# Patient Record
Sex: FEMALE | Race: WHITE | Employment: FULL TIME | URBAN - NONMETROPOLITAN AREA
[De-identification: names, ages, dates, MRNs, and addresses within clinical notes are randomized per-mention and may not be internally consistent; named-entity substitution may affect disease eponyms.]

---

## 2017-01-17 ENCOUNTER — OFFICE VISIT (OUTPATIENT)
Dept: SURGERY | Facility: OTHER | Age: 42
End: 2017-01-17
Attending: SURGERY
Payer: COMMERCIAL

## 2017-01-17 VITALS
DIASTOLIC BLOOD PRESSURE: 60 MMHG | TEMPERATURE: 98.6 F | HEART RATE: 58 BPM | BODY MASS INDEX: 27.28 KG/M2 | SYSTOLIC BLOOD PRESSURE: 110 MMHG | OXYGEN SATURATION: 98 % | RESPIRATION RATE: 20 BRPM | HEIGHT: 68 IN | WEIGHT: 180 LBS

## 2017-01-17 DIAGNOSIS — N64.89 RADIAL SCAR OF BREAST: ICD-10-CM

## 2017-01-17 DIAGNOSIS — N63.0 BREAST MASS: Primary | ICD-10-CM

## 2017-01-17 PROCEDURE — 99212 OFFICE O/P EST SF 10 MIN: CPT

## 2017-01-17 PROCEDURE — 99214 OFFICE O/P EST MOD 30 MIN: CPT | Performed by: SURGERY

## 2017-01-17 RX ORDER — CEFAZOLIN SODIUM 2 G/100ML
2 INJECTION, SOLUTION INTRAVENOUS
Status: CANCELLED | OUTPATIENT
Start: 2017-01-17

## 2017-01-17 ASSESSMENT — PAIN SCALES - GENERAL: PAINLEVEL: NO PAIN (0)

## 2017-01-17 NOTE — MR AVS SNAPSHOT
After Visit Summary   1/17/2017    Tere Camarena    MRN: [id number]           Patient Information     Date Of Birth          1975        Visit Information        Provider Department      1/17/2017 10:30 AM Sita Rushing MD [facility]        Today's Diagnoses     Breast mass    -  1       Care Instructions    Thank you for allowing Dr. Rushing and our surgical team to participate in your care.  Please call with any questions or concerns to our direct line at [phone].    You are scheduled for:  Wire localized excisional biopsy, left breast mass        The date of your procedure is: Monday, January 30, 2017    Preoperative history and physical with your primary care has been scheduled - January 26, 2017 at 0915    Nothing to eat or drink after midnight, Sunday, January 29, 2017        HOW TO PREPARE-       You need a friend or family member available to drive you home AND stay with you for 24 hours after you leave the hospital. You will not be allowed to drive yourself. IF you need to take a taxi or the bus you MUST have a responsible person to ride with you. YOUR PROCEDURE WILL BE CANCELLED IF YOU DO NOT HAVE A RESPONSIBLE ADULT TO DRIVE YOU HOME.       You need to call our Surgery Education Nurses 1-2 weeks prior to your surgery date. Please have you medication and allergy lists ready.  [phone] or toll free [phone]      Stop your aspirin or other NSAIDs(Ibuprofen, Motrin, Aleve, Celebrex, Naproxen, etc...) 7 days before your surgery.      Hospital admitting will call you the day before your surgery with your arrival time. If you are scheduled on a Monday admitting will call you the Friday before.      Please call your primary care physician if you should become ill within 24 hours of scheduled surgery. (ex.vomiting, diarrhea, fever)                 Follow-ups after your visit        Your next 10 appointments already scheduled     Jan 26, 2017  9:30 AM   (Arrive by  "9:15 AM)   Pre-Op physical with SURESH Gil   [facility])    [address]  [facility] [address]   [phone]              Who to contact     If you have questions or need follow up information about today's clinic visit or your schedule please contact [facility] directly at [phone].  Normal or non-critical lab and imaging results will be communicated to you by UNI5hart, letter or phone within 4 business days after the clinic has received the results. If you do not hear from us within 7 days, please contact the clinic through Ubequityt or phone. If you have a critical or abnormal lab result, we will notify you by phone as soon as possible.  Submit refill requests through [employer] or call your pharmacy and they will forward the refill request to us. Please allow 3 business days for your refill to be completed.          Additional Information About Your Visit        UNI5harChaCha Information     [employer] gives you secure access to your electronic health record. If you see a primary care provider, you can also send messages to your care team and make appointments. If you have questions, please call your primary care clinic.  If you do not have a primary care provider, please call [phone] and they will assist you.        Care EveryWhere ID     This is your Care EveryWhere ID. This could be used by other organizations to access your [place] medical records  [id number]        Your Vitals Were     Pulse Temperature Respirations Height BMI (Body Mass Index) Pulse Oximetry    58 98.6  F (37  C) (Tympanic) 20 5' 8\" (1.727 m) 27.38 kg/m2 98%       Blood Pressure from Last 3 Encounters:   01/17/17 110/60   10/18/16 112/64   10/02/16 120/80    Weight from Last 3 Encounters:   01/17/17 180 lb (81.647 kg)   10/18/16 180 lb (81.647 kg)   09/07/16 180 lb (81.647 kg)              Today, you had the following     No orders found for display       Primary Care Provider " Office Phone # Fax #    Yaneth SURESH Cash [phone]       [facility] [place] [address]  [place] [address]        Thank you!     Thank you for choosing [facility] [place]  for your care. Our goal is always to provide you with excellent care. Hearing back from our patients is one way we can continue to improve our services. Please take a few minutes to complete the written survey that you may receive in the mail after your visit with us. Thank you!             Your Updated Medication List - Protect others around you: Learn how to safely use, store and throw away your medicines at www.disposemymeds.org.          This list is accurate as of: 1/17/17 10:50 AM.  Always use your most recent med list.                   Brand Name Dispense Instructions for use    HYDROcodone-acetaminophen 5-325 MG per tablet    NORCO     Take 1 tablet by mouth every 4 hours as needed for moderate to severe pain 1-2 tablets as need for pain every 4 hours.       hydrocortisone 2.5 % cream    ANUSOL-HC    30 g    Place rectally 2 times daily 1 tube       ibuprofen 800 MG tablet    ADVIL/MOTRIN    60 tablet    Take 1 tablet (800 mg) by mouth every 8 hours as needed for moderate pain       olopatadine 0.1 % ophthalmic solution    PATANOL    5 mL    Place 1 drop into both eyes 2 times daily

## 2017-01-17 NOTE — PATIENT INSTRUCTIONS
Thank you for allowing Dr. Rushing and our surgical team to participate in your care.  Please call with any questions or concerns to our direct line at [phone].    You are scheduled for:  Wire localized excisional biopsy, left breast mass        The date of your procedure is: Monday, January 30, 2017    Preoperative history and physical with your primary care has been scheduled - January 26, 2017 at 0915    Nothing to eat or drink after midnight, Sunday, January 29, 2017        HOW TO PREPARE-       You need a friend or family member available to drive you home AND stay with you for 24 hours after you leave the hospital. You will not be allowed to drive yourself. IF you need to take a taxi or the bus you MUST have a responsible person to ride with you. YOUR PROCEDURE WILL BE CANCELLED IF YOU DO NOT HAVE A RESPONSIBLE ADULT TO DRIVE YOU HOME.       You need to call our Surgery Education Nurses 1-2 weeks prior to your surgery date. Please have you medication and allergy lists ready.  [phone] or toll free [phone]      Stop your aspirin or other NSAIDs(Ibuprofen, Motrin, Aleve, Celebrex, Naproxen, etc...) 7 days before your surgery.      Hospital admitting will call you the day before your surgery with your arrival time. If you are scheduled on a Monday admitting will call you the Friday before.      Please call your primary care physician if you should become ill within 24 hours of scheduled surgery. (ex.vomiting, diarrhea, fever)

## 2017-01-17 NOTE — NURSING NOTE
"Chief Complaint   Patient presents with     Breast Problem     Dsicuss left breast mass. Bx done 09/21/2016.       Initial /60 mmHg  Pulse 58  Temp(Src) 98.6  F (37  C) (Tympanic)  Resp 20  Ht 5' 8\" (1.727 m)  Wt 180 lb (81.647 kg)  BMI 27.38 kg/m2  SpO2 98% Estimated body mass index is 27.38 kg/(m^2) as calculated from the following:    Height as of this encounter: 5' 8\" (1.727 m).    Weight as of this encounter: 180 lb (81.647 kg).  BP completed using cuff size: jon Gomez      "

## 2017-01-24 ENCOUNTER — TELEPHONE (OUTPATIENT)
Dept: FAMILY MEDICINE | Facility: OTHER | Age: 42
End: 2017-01-24

## 2017-01-24 DIAGNOSIS — T78.40XA ALLERGIC STATE, INITIAL ENCOUNTER: Primary | ICD-10-CM

## 2017-01-24 NOTE — TELEPHONE ENCOUNTER
Received a fax from [facility] that Patanol Op 0.1% Sol drops are not covered.  Switch to Ketotifen or Cromolyn?  I pended the Ketotifen 0.025% drops for you that you had circled on the paper.  Thanks.

## 2017-01-25 DIAGNOSIS — N63.20 LEFT BREAST MASS: Primary | ICD-10-CM

## 2017-01-27 ENCOUNTER — TELEPHONE (OUTPATIENT)
Dept: FAMILY MEDICINE | Facility: OTHER | Age: 42
End: 2017-01-27

## 2017-01-27 ENCOUNTER — OFFICE VISIT (OUTPATIENT)
Dept: FAMILY MEDICINE | Facility: OTHER | Age: 42
End: 2017-01-27
Attending: PHYSICIAN ASSISTANT
Payer: COMMERCIAL

## 2017-01-27 VITALS
HEART RATE: 66 BPM | HEIGHT: 68 IN | DIASTOLIC BLOOD PRESSURE: 66 MMHG | SYSTOLIC BLOOD PRESSURE: 107 MMHG | OXYGEN SATURATION: 98 % | WEIGHT: 198.2 LBS | TEMPERATURE: 97 F | RESPIRATION RATE: 16 BRPM | BODY MASS INDEX: 30.04 KG/M2

## 2017-01-27 DIAGNOSIS — Z01.818 PREOP GENERAL PHYSICAL EXAM: Primary | ICD-10-CM

## 2017-01-27 LAB
ANION GAP SERPL CALCULATED.3IONS-SCNC: 10 MMOL/L (ref 3–14)
BUN SERPL-MCNC: 8 MG/DL (ref 7–30)
CALCIUM SERPL-MCNC: 8.3 MG/DL (ref 8.5–10.1)
CHLORIDE SERPL-SCNC: 108 MMOL/L (ref 94–109)
CO2 SERPL-SCNC: 23 MMOL/L (ref 20–32)
CREAT SERPL-MCNC: 0.58 MG/DL (ref 0.52–1.04)
ERYTHROCYTE [DISTWIDTH] IN BLOOD BY AUTOMATED COUNT: 12.6 % (ref 10–15)
GFR SERPL CREATININE-BSD FRML MDRD: ABNORMAL ML/MIN/1.7M2
GLUCOSE SERPL-MCNC: 83 MG/DL (ref 70–99)
HCG UR QL: NEGATIVE
HCT VFR BLD AUTO: 37.8 % (ref 35–47)
HGB BLD-MCNC: 12.9 G/DL (ref 11.7–15.7)
MCH RBC QN AUTO: 33 PG (ref 26.5–33)
MCHC RBC AUTO-ENTMCNC: 34.1 G/DL (ref 31.5–36.5)
MCV RBC AUTO: 97 FL (ref 78–100)
PLATELET # BLD AUTO: 195 10E9/L (ref 150–450)
POTASSIUM SERPL-SCNC: 3.6 MMOL/L (ref 3.4–5.3)
RBC # BLD AUTO: 3.91 10E12/L (ref 3.8–5.2)
SODIUM SERPL-SCNC: 141 MMOL/L (ref 133–144)
TSH SERPL DL<=0.05 MIU/L-ACNC: 1.75 MU/L (ref 0.4–4)
WBC # BLD AUTO: 6.1 10E9/L (ref 4–11)

## 2017-01-27 PROCEDURE — 93010 ELECTROCARDIOGRAM REPORT: CPT | Performed by: INTERNAL MEDICINE

## 2017-01-27 PROCEDURE — 84443 ASSAY THYROID STIM HORMONE: CPT | Performed by: PHYSICIAN ASSISTANT

## 2017-01-27 PROCEDURE — 81025 URINE PREGNANCY TEST: CPT | Performed by: PHYSICIAN ASSISTANT

## 2017-01-27 PROCEDURE — 99212 OFFICE O/P EST SF 10 MIN: CPT | Mod: 25

## 2017-01-27 PROCEDURE — 93005 ELECTROCARDIOGRAM TRACING: CPT

## 2017-01-27 PROCEDURE — 80048 BASIC METABOLIC PNL TOTAL CA: CPT | Performed by: PHYSICIAN ASSISTANT

## 2017-01-27 PROCEDURE — 99214 OFFICE O/P EST MOD 30 MIN: CPT | Mod: 25 | Performed by: PHYSICIAN ASSISTANT

## 2017-01-27 PROCEDURE — 36415 COLL VENOUS BLD VENIPUNCTURE: CPT | Performed by: PHYSICIAN ASSISTANT

## 2017-01-27 PROCEDURE — 85027 COMPLETE CBC AUTOMATED: CPT | Performed by: PHYSICIAN ASSISTANT

## 2017-01-27 ASSESSMENT — PAIN SCALES - GENERAL: PAINLEVEL: NO PAIN (0)

## 2017-01-27 NOTE — PROGRESS NOTES
[address]  [facility] [address]  [phone]  Dept: [phone]    PRE-OP EVALUATION:  Today's date: 2017    Tere Camarena (: 1975) presents for pre-operative evaluation assessment as requested by Dr. Rushing.  She requires evaluation and anesthesia risk assessment prior to undergoing surgery/procedure for treatment of left breast mass .  Proposed procedure: left breast wire localized excisoinal bx    Date of Surgery/ Procedure: 17    Hospital/Surgical Facility: [facility]    Primary Physician: Yaneth Ochoa  Type of Anesthesia Anticipated: to be determined    Patient has a Health Care Directive or Living Will:  NO    1. NO - Do you have a history of heart attack, stroke, stent, bypass or surgery on an artery in the head, neck, heart or legs?  2. NO - Do you ever have any pain or discomfort in your chest?  3. NO - Do you have a history of  Heart Failure?  4. NO - Are you troubled by shortness of breath when: walking on the level, up a slight hill or at night?  5. NO - Do you currently have a cold, bronchitis or other respiratory infection?  6. NO - Do you have a cough, shortness of breath or wheezing?  7. NO - Do you sometimes get pains in the calves of your legs when you walk?  8. NO - Do you or anyone in your family have previous history of blood clots?  9. NO - Do you or does anyone in your family have a serious bleeding problem such as prolonged bleeding following surgeries or cuts?  10. YES - HAVE YOU EVER HAD PROBLEMS WITH ANEMIA OR BEEN TOLD TO TAKE IRON PILLS? When pregnant  11. YES - HAVE YOU HAD ANY ABNORMAL BLOOD LOSS SUCH AS BLACK, TARRY OR BLOODY STOOLS, OR ABNORMAL VAGINAL BLEEDING? Pt has current irregular periods and blood in stools  12. NO - Have you ever had a blood transfusion?  13. NO - Have you or any of your relatives ever had problems with anesthesia?  14. NO - Do you have sleep apnea, excessive snoring or daytime drowsiness?  15. NO - Do you  have any prosthetic heart valves?  16. NO - Do you have prosthetic joints?  17. NO - Is there any chance that you may be pregnant?      HPI:                                                      Brief HPI related to upcoming procedure: Breast mass found on mammogram.      See problem list for active medical problems.  Problems all longstanding and stable, except as noted/documented.  See ROS for pertinent symptoms related to these conditions.                                                                                                  .    MEDICAL HISTORY:                                                      Patient Active Problem List    Diagnosis Date Noted     ACP (advance care planning) 06/30/2016     Priority: Medium     Advance Care Planning 6/30/2016: ACP Review of Chart / Resources Provided:  Reviewed chart for advance care plan.  Tere Camarena has no plan or code status on file. Discussed available resources and provided with information. Confirmed code status reflects current choices pending further ACP discussions.  Confirmed/documented legally designated decision makers.  Added by Mitra Adamson              History reviewed. No pertinent past medical history.  Past Surgical History   Procedure Laterality Date     Abdomen surgery       hernia surgery X5     Gyn surgery  2004     tubal ligation     Breast surgery Left 09/28/2016     biopsy     Breast surgery Left 09/13/2016     biopsy     Orthopedic surgery Left [id number]     left knee medial retinacular repair, VMO advancement, and reapir of partial tear of quadriceps tendon     Current Outpatient Prescriptions   Medication Sig Dispense Refill     ketotifen (ZADITOR/REFRESH ANTI-ITCH) 0.025 % SOLN ophthalmic solution Place 1 drop into both eyes 2 times daily 1 Bottle 0     HYDROcodone-acetaminophen (NORCO) 5-325 MG per tablet Take 1 tablet by mouth every 4 hours as needed for moderate to severe pain 1-2 tablets as need for pain every 4 hours.        olopatadine (PATANOL) 0.1 % ophthalmic solution Place 1 drop into both eyes 2 times daily 5 mL 3     hydrocortisone (ANUSOL-HC) 2.5 % rectal cream Place rectally 2 times daily 1 tube 30 g 2     ibuprofen (ADVIL,MOTRIN) 800 MG tablet Take 1 tablet (800 mg) by mouth every 8 hours as needed for moderate pain 60 tablet 1     OTC products: None, except as noted above    Allergies   Allergen Reactions     Peanuts [Peanut Oil]      Eyes swell     Trees Other (See Comments)     Tree sap makes her eyes swell shut      Latex Allergy: NO    Social History   Substance Use Topics     Smoking status: Current Every Day Smoker -- 0.50 packs/day for 11 years     Smokeless tobacco: Never Used      Comment: trying on her own     Alcohol Use: Yes      Comment: twice a week     History   Drug Use No       REVIEW OF SYSTEMS:                                                    C: NEGATIVE for fever, chills, change in weight  I: NEGATIVE for worrisome rashes, moles or lesions  E: NEGATIVE for vision changes or irritation  E/M: NEGATIVE for ear, mouth and throat problems  R: NEGATIVE for significant cough or SOB  CV: NEGATIVE for chest pain, palpitations or peripheral edema  GI: NEGATIVE for nausea, abdominal pain, heartburn, or change in bowel habits  : NEGATIVE for frequency, dysuria, or hematuria  M: NEGATIVE for significant arthralgias or myalgia  N: NEGATIVE for weakness, dizziness or paresthesias  E: NEGATIVE for temperature intolerance, skin/hair changes  H: NEGATIVE for bleeding problems  P: NEGATIVE for changes in mood or affect    EXAM:                                                    [facility] 01/10/2017    GENERAL APPEARANCE: healthy, alert and no distress     EYES: EOMI,- PERRL     HENT: ear canals and TM's normal and nose and mouth without ulcers or lesions     NECK: no adenopathy, no asymmetry, masses, or scars and thyroid normal to palpation     RESP: lungs clear to auscultation - no rales, rhonchi or wheezes     CV: regular  rates and rhythm, normal S1 S2, no S3 or S4 and no murmur, click or rub -     ABDOMEN:  soft, nontender, no HSM or masses and bowel sounds normal     SKIN: no suspicious lesions or rashes     NEURO: Normal strength and tone, sensory exam grossly normal, mentation intact and speech normal     PSYCH: mentation appears normal. and affect normal/bright     LYMPHATICS: No axillary, cervical, inguinal, or supraclavicular nodes    DIAGNOSTICS:                                                      Results for orders placed or performed in visit on 01/27/17   CBC with platelets   Result Value Ref Range    WBC 6.1 4.0 - 11.0 10e9/L    RBC Count 3.91 3.8 - 5.2 10e12/L    Hemoglobin 12.9 11.7 - 15.7 g/dL    Hematocrit 37.8 35.0 - 47.0 %    MCV 97 78 - 100 fl    MCH 33.0 26.5 - 33.0 pg    MCHC 34.1 31.5 - 36.5 g/dL    RDW 12.6 10.0 - 15.0 %    Platelet Count 195 150 - 450 10e9/L   Basic metabolic panel   Result Value Ref Range    Sodium 141 133 - 144 mmol/L    Potassium 3.6 3.4 - 5.3 mmol/L    Chloride 108 94 - 109 mmol/L    Carbon Dioxide 23 20 - 32 mmol/L    Anion Gap 10 3 - 14 mmol/L    Glucose 83 70 - 99 mg/dL    Urea Nitrogen 8 7 - 30 mg/dL    Creatinine 0.58 0.52 - 1.04 mg/dL    GFR Estimate >90  Non  GFR Calc   >60 mL/min/1.7m2    GFR Estimate If Black >90   GFR Calc   >60 mL/min/1.7m2    Calcium 8.3 (L) 8.5 - 10.1 mg/dL   TSH   Result Value Ref Range    TSH 1.75 0.40 - 4.00 mU/L   HCG qualitative urine   Result Value Ref Range    HCG Qual Urine Negative NEG         Recent Labs   Lab Test  04/08/16   0954  11/11/15   1218   HGB  15.6  14.4   PLT  226  240   INR   --   0.90   NA  138  138   POTASSIUM  3.5  3.7   CR  0.47*  0.57   A1C  4.9   --         IMPRESSION:                                                      (Z01.818) Preop general physical exam  (primary encounter diagnosis)          RECOMMENDATIONS:                                                    Patient had a normal exam today.  She is cleared for surgery 1030-17, [facility], Dr. Rushing.       Signed Electronically by: SURESH Garcia    Copy of this evaluation report is provided to requesting physician.    [place] Preop Guidelines

## 2017-01-27 NOTE — TELEPHONE ENCOUNTER
Received a fax from [facility] that a Prior Auth is needed for patient's Patanol Op 0.1% Sol.  [employer] form was filled out and faxed to [phone] on 1/26/17.  The pharmacy and patient will be notified once we hear back on an approval or denial from insurance.  Forms will be sent to scanning once we have everything complete.  Rosalie Romero LPN

## 2017-01-27 NOTE — NURSING NOTE
"Chief Complaint   Patient presents with     Pre-Op Exam       Initial /66 mmHg  Pulse 66  Temp(Src) 97  F (36.1  C) (Tympanic)  Resp 16  Ht 5' 8\" (1.727 m)  Wt 198 lb 3.2 oz (89.903 kg)  BMI 30.14 kg/m2  SpO2 98%  LMP 01/10/2017 Estimated body mass index is 30.14 kg/(m^2) as calculated from the following:    Height as of this encounter: 5' 8\" (1.727 m).    Weight as of this encounter: 198 lb 3.2 oz (89.903 kg).  BP completed using cuff size: jon Adamson      "

## 2017-01-27 NOTE — MR AVS SNAPSHOT
After Visit Summary   1/27/2017    Tere Camarena    MRN: [id number]           Patient Information     Date Of Birth          1975        Visit Information        Provider Department      1/27/2017 9:30 AM Yaneth Ochoa PA [facility]        Today's Diagnoses     Preop general physical exam    -  1       Care Instructions      Before Your Surgery      Call your surgeon if there is any change in your health. This includes signs of a cold or flu (such as a sore throat, runny nose, cough, rash or fever).    Do not smoke, drink alcohol or take over the counter medicine (unless your surgeon or primary care doctor tells you to) for the 24 hours before and after surgery.    If you take prescribed drugs: Follow your doctor s orders about which medicines to take and which to stop until after surgery.    Eating and drinking prior to surgery: follow the instructions from your surgeon    Take a shower or bath the night before surgery. Use the soap your surgeon gave you to gently clean your skin. If you do not have soap from your surgeon, use your regular soap. Do not shave or scrub the surgery site.  Wear clean pajamas and have clean sheets on your bed.         Follow-ups after your visit        Your next 10 appointments already scheduled     Jan 30, 2017  8:30 AM   MAMMOGRAM with HI STEROEOTACTIC BREAST BIOPSY   [facility] Mammography ([facility] )    [address] [facility] [address] [phone]           Do not wear any body powder, lotions, deodorant or perfume the day of the exam. Bring a list of all medications, especially hormones.  If your last mammogram was not done at [place], please bring your mammogram films. We will need the name of your MD/PA to send a copy of your report.  You may register in either the clinic or hospital, but the mammogram will done at the [facility] located in the Hospital.            Jan 30, 2017  8:30 AM   Radiology with Need  "Interventional Radiologist   [facility] Interventional Radiology ([facility] )    [address] [address]   [phone]            Jan 30, 2017   Procedure with Sita Rushing MD   [facility] Periop Services ([facility] )    [address] [id number] [phone]              Who to contact     If you have questions or need follow up information about today's clinic visit or your schedule please contact [facility] directly at [phone].  Normal or non-critical lab and imaging results will be communicated to you by Vibeshart, letter or phone within 4 business days after the clinic has received the results. If you do not hear from us within 7 days, please contact the clinic through Dreamitize or phone. If you have a critical or abnormal lab result, we will notify you by phone as soon as possible.  Submit refill requests through [employer] or call your pharmacy and they will forward the refill request to us. Please allow 3 business days for your refill to be completed.          Additional Information About Your Visit        VibesharBomoda Information     [employer] gives you secure access to your electronic health record. If you see a primary care provider, you can also send messages to your care team and make appointments. If you have questions, please call your primary care clinic.  If you do not have a primary care provider, please call [phone] and they will assist you.        Care EveryWhere ID     This is your Care EveryWhere ID. This could be used by other organizations to access your [place] medical records  [id number]        Your Vitals Were     Pulse Temperature Respirations Height BMI (Body Mass Index) Pulse Oximetry    66 97  F (36.1  C) (Tympanic) 16 5' 8\" (1.727 m) 30.14 kg/m2 98%    Last Period                   01/10/2017            Blood Pressure from Last 3 Encounters:   01/27/17 107/66   01/17/17 110/60   10/18/16 112/64    Weight from Last 3 " Encounters:   01/27/17 198 lb 3.2 oz (89.903 kg)   01/17/17 180 lb (81.647 kg)   10/18/16 180 lb (81.647 kg)              We Performed the Following     Basic metabolic panel     CBC with platelets     EKG 12-lead complete w/read - (Clinic Performed)     HCG qualitative urine     TSH          Today's Medication Changes          These changes are accurate as of: 1/27/17 10:59 AM.  If you have any questions, ask your nurse or doctor.               Stop taking these medicines if you haven't already. Please contact your care team if you have questions.     olopatadine 0.1 % ophthalmic solution   Commonly known as:  PATANOL   Stopped by:  Yaneth Ochoa PA                    Primary Care Provider Office Phone # Fax #    SURESH Gil [phone]       [facility] [place] [address]  [place] [address]        Thank you!     Thank you for choosing [facility]  for your care. Our goal is always to provide you with excellent care. Hearing back from our patients is one way we can continue to improve our services. Please take a few minutes to complete the written survey that you may receive in the mail after your visit with us. Thank you!             Your Updated Medication List - Protect others around you: Learn how to safely use, store and throw away your medicines at www.disposemymeds.org.          This list is accurate as of: 1/27/17 10:59 AM.  Always use your most recent med list.                   Brand Name Dispense Instructions for use    HYDROcodone-acetaminophen 5-325 MG per tablet    NORCO     Take 1 tablet by mouth every 4 hours as needed for moderate to severe pain 1-2 tablets as need for pain every 4 hours.       hydrocortisone 2.5 % cream    ANUSOL-HC    30 g    Place rectally 2 times daily 1 tube       ibuprofen 800 MG tablet    ADVIL/MOTRIN    60 tablet    Take 1 tablet (800 mg) by mouth every 8 hours as needed for moderate pain       ketotifen 0.025 % Soln  ophthalmic solution    ZADITOR/REFRESH ANTI-ITCH    1 Bottle    Place 1 drop into both eyes 2 times daily

## 2017-01-30 ENCOUNTER — HOSPITAL ENCOUNTER (OUTPATIENT)
Dept: MAMMOGRAPHY | Facility: HOSPITAL | Age: 42
End: 2017-01-30
Attending: SURGERY | Admitting: SURGERY
Payer: COMMERCIAL

## 2017-01-30 ENCOUNTER — APPOINTMENT (OUTPATIENT)
Dept: INTERVENTIONAL RADIOLOGY/VASCULAR | Facility: HOSPITAL | Age: 42
End: 2017-01-30
Attending: SURGERY
Payer: COMMERCIAL

## 2017-01-30 ENCOUNTER — SURGERY (OUTPATIENT)
Age: 42
End: 2017-01-30

## 2017-01-30 ENCOUNTER — ANESTHESIA EVENT (OUTPATIENT)
Dept: SURGERY | Facility: HOSPITAL | Age: 42
End: 2017-01-30
Payer: COMMERCIAL

## 2017-01-30 ENCOUNTER — ANESTHESIA (OUTPATIENT)
Dept: SURGERY | Facility: HOSPITAL | Age: 42
End: 2017-01-30
Payer: COMMERCIAL

## 2017-01-30 ENCOUNTER — HOSPITAL ENCOUNTER (OUTPATIENT)
Facility: HOSPITAL | Age: 42
Discharge: HOME | End: 2017-01-30
Attending: SURGERY | Admitting: SURGERY
Payer: COMMERCIAL

## 2017-01-30 VITALS
DIASTOLIC BLOOD PRESSURE: 60 MMHG | HEIGHT: 68 IN | SYSTOLIC BLOOD PRESSURE: 102 MMHG | HEART RATE: 71 BPM | BODY MASS INDEX: 28.79 KG/M2 | WEIGHT: 190 LBS | RESPIRATION RATE: 16 BRPM | OXYGEN SATURATION: 95 % | TEMPERATURE: 98.6 F

## 2017-01-30 DIAGNOSIS — Z98.890 STATUS POST LEFT BREAST BIOPSY: Primary | ICD-10-CM

## 2017-01-30 PROCEDURE — 71000014 ZZH RECOVERY PHASE 1 LEVEL 2 FIRST HR: Performed by: SURGERY

## 2017-01-30 PROCEDURE — 25000125 ZZHC RX 250: Performed by: ANESTHESIOLOGY

## 2017-01-30 PROCEDURE — 88307 TISSUE EXAM BY PATHOLOGIST: CPT | Mod: TC | Performed by: SURGERY

## 2017-01-30 PROCEDURE — 71000027 ZZH RECOVERY PHASE 2 EACH 15 MINS: Performed by: SURGERY

## 2017-01-30 PROCEDURE — 25000128 H RX IP 250 OP 636: Performed by: SURGERY

## 2017-01-30 PROCEDURE — 27210995 ZZH RX 272: Performed by: SURGERY

## 2017-01-30 PROCEDURE — 40000306 ZZH STATISTIC PRE PROC ASSESS II: Performed by: SURGERY

## 2017-01-30 PROCEDURE — 27210794 ZZH OR GENERAL SUPPLY STERILE: Performed by: SURGERY

## 2017-01-30 PROCEDURE — 25000125 ZZHC RX 250

## 2017-01-30 PROCEDURE — 36000052 ZZH SURGERY LEVEL 2 EA 15 ADDTL MIN: Performed by: SURGERY

## 2017-01-30 PROCEDURE — 25000566 ZZH SEVOFLURANE, EA 15 MIN: Performed by: ANESTHESIOLOGY

## 2017-01-30 PROCEDURE — 25000132 ZZH RX MED GY IP 250 OP 250 PS 637

## 2017-01-30 PROCEDURE — 25000125 ZZHC RX 250: Performed by: NURSE ANESTHETIST, CERTIFIED REGISTERED

## 2017-01-30 PROCEDURE — 25800025 ZZH RX 258: Performed by: ANESTHESIOLOGY

## 2017-01-30 PROCEDURE — 27110028 ZZH OR GENERAL SUPPLY NON-STERILE: Performed by: SURGERY

## 2017-01-30 PROCEDURE — 19281 PERQ DEVICE BREAST 1ST IMAG: CPT | Mod: TC,LT | Performed by: RADIOLOGY

## 2017-01-30 PROCEDURE — 25000125 ZZHC RX 250: Performed by: SURGERY

## 2017-01-30 PROCEDURE — 37000009 ZZH ANESTHESIA TECHNICAL FEE, EACH ADDTL 15 MIN: Performed by: SURGERY

## 2017-01-30 PROCEDURE — 19125 EXCISION BREAST LESION: CPT | Performed by: ANESTHESIOLOGY

## 2017-01-30 PROCEDURE — 36000050 ZZH SURGERY LEVEL 2 1ST 30 MIN: Performed by: SURGERY

## 2017-01-30 PROCEDURE — 19125 EXCISION BREAST LESION: CPT | Mod: LT | Performed by: SURGERY

## 2017-01-30 PROCEDURE — 01999 UNLISTED ANES PROCEDURE: CPT | Performed by: NURSE ANESTHETIST, CERTIFIED REGISTERED

## 2017-01-30 PROCEDURE — 37000008 ZZH ANESTHESIA TECHNICAL FEE, 1ST 30 MIN: Performed by: SURGERY

## 2017-01-30 RX ORDER — LIDOCAINE HYDROCHLORIDE 10 MG/ML
20 INJECTION, SOLUTION EPIDURAL; INFILTRATION; INTRACAUDAL; PERINEURAL ONCE
Status: DISCONTINUED | OUTPATIENT
Start: 2017-01-30 | End: 2017-01-30 | Stop reason: HOSPADM

## 2017-01-30 RX ORDER — LABETALOL HYDROCHLORIDE 5 MG/ML
10 INJECTION, SOLUTION INTRAVENOUS
Status: DISCONTINUED | OUTPATIENT
Start: 2017-01-30 | End: 2017-01-30 | Stop reason: HOSPADM

## 2017-01-30 RX ORDER — ALBUTEROL SULFATE 0.83 MG/ML
2.5 SOLUTION RESPIRATORY (INHALATION) EVERY 4 HOURS PRN
Status: DISCONTINUED | OUTPATIENT
Start: 2017-01-30 | End: 2017-01-30 | Stop reason: HOSPADM

## 2017-01-30 RX ORDER — HYDROMORPHONE HYDROCHLORIDE 1 MG/ML
.3-.5 INJECTION, SOLUTION INTRAMUSCULAR; INTRAVENOUS; SUBCUTANEOUS EVERY 10 MIN PRN
Status: DISCONTINUED | OUTPATIENT
Start: 2017-01-30 | End: 2017-01-30 | Stop reason: HOSPADM

## 2017-01-30 RX ORDER — LIDOCAINE HYDROCHLORIDE 10 MG/ML
INJECTION, SOLUTION EPIDURAL; INFILTRATION; INTRACAUDAL; PERINEURAL
Status: DISCONTINUED
Start: 2017-01-30 | End: 2017-01-30 | Stop reason: HOSPADM

## 2017-01-30 RX ORDER — HYDROCODONE BITARTRATE AND ACETAMINOPHEN 5; 325 MG/1; MG/1
TABLET ORAL
Status: COMPLETED
Start: 2017-01-30 | End: 2017-01-30

## 2017-01-30 RX ORDER — LIDOCAINE HYDROCHLORIDE 20 MG/ML
INJECTION, SOLUTION INFILTRATION; PERINEURAL PRN
Status: DISCONTINUED | OUTPATIENT
Start: 2017-01-30 | End: 2017-01-30

## 2017-01-30 RX ORDER — CEFAZOLIN SODIUM 2 G/100ML
2 INJECTION, SOLUTION INTRAVENOUS
Status: COMPLETED | OUTPATIENT
Start: 2017-01-30 | End: 2017-01-30

## 2017-01-30 RX ORDER — DEXAMETHASONE SODIUM PHOSPHATE 4 MG/ML
4 INJECTION, SOLUTION INTRA-ARTICULAR; INTRALESIONAL; INTRAMUSCULAR; INTRAVENOUS; SOFT TISSUE EVERY 10 MIN PRN
Status: DISCONTINUED | OUTPATIENT
Start: 2017-01-30 | End: 2017-01-30 | Stop reason: HOSPADM

## 2017-01-30 RX ORDER — FENTANYL CITRATE 50 UG/ML
INJECTION, SOLUTION INTRAMUSCULAR; INTRAVENOUS PRN
Status: DISCONTINUED | OUTPATIENT
Start: 2017-01-30 | End: 2017-01-30

## 2017-01-30 RX ORDER — KETOROLAC TROMETHAMINE 30 MG/ML
30 INJECTION, SOLUTION INTRAMUSCULAR; INTRAVENOUS EVERY 6 HOURS PRN
Status: DISCONTINUED | OUTPATIENT
Start: 2017-01-30 | End: 2017-01-30 | Stop reason: HOSPADM

## 2017-01-30 RX ORDER — SODIUM CHLORIDE, SODIUM LACTATE, POTASSIUM CHLORIDE, CALCIUM CHLORIDE 600; 310; 30; 20 MG/100ML; MG/100ML; MG/100ML; MG/100ML
1000 INJECTION, SOLUTION INTRAVENOUS CONTINUOUS
Status: DISCONTINUED | OUTPATIENT
Start: 2017-01-30 | End: 2017-01-30 | Stop reason: HOSPADM

## 2017-01-30 RX ORDER — BUPIVACAINE HYDROCHLORIDE AND EPINEPHRINE 2.5; 5 MG/ML; UG/ML
INJECTION, SOLUTION INFILTRATION; PERINEURAL PRN
Status: DISCONTINUED | OUTPATIENT
Start: 2017-01-30 | End: 2017-01-30 | Stop reason: HOSPADM

## 2017-01-30 RX ORDER — FENTANYL CITRATE 50 UG/ML
25-50 INJECTION, SOLUTION INTRAMUSCULAR; INTRAVENOUS
Status: DISCONTINUED | OUTPATIENT
Start: 2017-01-30 | End: 2017-01-30 | Stop reason: HOSPADM

## 2017-01-30 RX ORDER — ONDANSETRON 2 MG/ML
INJECTION INTRAMUSCULAR; INTRAVENOUS PRN
Status: DISCONTINUED | OUTPATIENT
Start: 2017-01-30 | End: 2017-01-30

## 2017-01-30 RX ORDER — FENTANYL CITRATE 50 UG/ML
INJECTION, SOLUTION INTRAMUSCULAR; INTRAVENOUS
Status: COMPLETED
Start: 2017-01-30 | End: 2017-01-30

## 2017-01-30 RX ORDER — ONDANSETRON 4 MG/1
4 TABLET, ORALLY DISINTEGRATING ORAL EVERY 30 MIN PRN
Status: DISCONTINUED | OUTPATIENT
Start: 2017-01-30 | End: 2017-01-30 | Stop reason: HOSPADM

## 2017-01-30 RX ORDER — NALOXONE HYDROCHLORIDE 0.4 MG/ML
.1-.4 INJECTION, SOLUTION INTRAMUSCULAR; INTRAVENOUS; SUBCUTANEOUS
Status: DISCONTINUED | OUTPATIENT
Start: 2017-01-30 | End: 2017-01-30 | Stop reason: HOSPADM

## 2017-01-30 RX ORDER — HYDRALAZINE HYDROCHLORIDE 20 MG/ML
2.5-5 INJECTION INTRAMUSCULAR; INTRAVENOUS EVERY 10 MIN PRN
Status: DISCONTINUED | OUTPATIENT
Start: 2017-01-30 | End: 2017-01-30 | Stop reason: HOSPADM

## 2017-01-30 RX ORDER — HYDROCODONE BITARTRATE AND ACETAMINOPHEN 5; 325 MG/1; MG/1
1 TABLET ORAL EVERY 4 HOURS PRN
Qty: 30 TABLET | Refills: 0 | Status: SHIPPED | OUTPATIENT
Start: 2017-01-30 | End: 2017-04-27

## 2017-01-30 RX ORDER — SODIUM CHLORIDE, SODIUM LACTATE, POTASSIUM CHLORIDE, CALCIUM CHLORIDE 600; 310; 30; 20 MG/100ML; MG/100ML; MG/100ML; MG/100ML
INJECTION, SOLUTION INTRAVENOUS CONTINUOUS
Status: DISCONTINUED | OUTPATIENT
Start: 2017-01-30 | End: 2017-01-30 | Stop reason: HOSPADM

## 2017-01-30 RX ORDER — MEPERIDINE HYDROCHLORIDE 25 MG/ML
12.5 INJECTION INTRAMUSCULAR; INTRAVENOUS; SUBCUTANEOUS
Status: DISCONTINUED | OUTPATIENT
Start: 2017-01-30 | End: 2017-01-30 | Stop reason: HOSPADM

## 2017-01-30 RX ORDER — SCOLOPAMINE TRANSDERMAL SYSTEM 1 MG/1
1 PATCH, EXTENDED RELEASE TRANSDERMAL ONCE
Status: COMPLETED | OUTPATIENT
Start: 2017-01-30 | End: 2017-01-30

## 2017-01-30 RX ORDER — DEXAMETHASONE SODIUM PHOSPHATE 10 MG/ML
INJECTION, SOLUTION INTRAMUSCULAR; INTRAVENOUS PRN
Status: DISCONTINUED | OUTPATIENT
Start: 2017-01-30 | End: 2017-01-30

## 2017-01-30 RX ORDER — FENTANYL CITRATE 50 UG/ML
25-50 INJECTION, SOLUTION INTRAMUSCULAR; INTRAVENOUS EVERY 5 MIN PRN
Status: DISCONTINUED | OUTPATIENT
Start: 2017-01-30 | End: 2017-01-30 | Stop reason: HOSPADM

## 2017-01-30 RX ORDER — PROPOFOL 10 MG/ML
INJECTION, EMULSION INTRAVENOUS PRN
Status: DISCONTINUED | OUTPATIENT
Start: 2017-01-30 | End: 2017-01-30

## 2017-01-30 RX ORDER — ONDANSETRON 2 MG/ML
4 INJECTION INTRAMUSCULAR; INTRAVENOUS EVERY 30 MIN PRN
Status: DISCONTINUED | OUTPATIENT
Start: 2017-01-30 | End: 2017-01-30 | Stop reason: HOSPADM

## 2017-01-30 RX ADMIN — FENTANYL CITRATE 25 MCG: 50 INJECTION, SOLUTION INTRAMUSCULAR; INTRAVENOUS at 11:46

## 2017-01-30 RX ADMIN — ONDANSETRON 4 MG: 2 INJECTION INTRAMUSCULAR; INTRAVENOUS at 11:24

## 2017-01-30 RX ADMIN — PROPOFOL 200 MG: 10 INJECTION, EMULSION INTRAVENOUS at 11:30

## 2017-01-30 RX ADMIN — HYDROCODONE BITARTRATE AND ACETAMINOPHEN 1 TABLET: 5; 325 TABLET ORAL at 13:39

## 2017-01-30 RX ADMIN — FENTANYL CITRATE 100 MCG: 50 INJECTION, SOLUTION INTRAMUSCULAR; INTRAVENOUS at 11:24

## 2017-01-30 RX ADMIN — DEXAMETHASONE SODIUM PHOSPHATE 10 MG: 10 INJECTION, SOLUTION INTRAMUSCULAR; INTRAVENOUS at 11:35

## 2017-01-30 RX ADMIN — HYDROMORPHONE HYDROCHLORIDE 0.5 MG: 1 INJECTION, SOLUTION INTRAMUSCULAR; INTRAVENOUS; SUBCUTANEOUS at 13:17

## 2017-01-30 RX ADMIN — CEFAZOLIN SODIUM 2 G: 2 INJECTION, SOLUTION INTRAVENOUS at 11:35

## 2017-01-30 RX ADMIN — CEFAZOLIN 1000 ML GIVEN: 1 INJECTION, POWDER, FOR SOLUTION INTRAMUSCULAR; INTRAVENOUS at 12:33

## 2017-01-30 RX ADMIN — FENTANYL CITRATE 50 MCG: 50 INJECTION, SOLUTION INTRAMUSCULAR; INTRAVENOUS at 12:33

## 2017-01-30 RX ADMIN — SCOPALAMINE 1 PATCH: 1 PATCH, EXTENDED RELEASE TRANSDERMAL at 07:47

## 2017-01-30 RX ADMIN — SODIUM CHLORIDE, POTASSIUM CHLORIDE, SODIUM LACTATE AND CALCIUM CHLORIDE: 600; 310; 30; 20 INJECTION, SOLUTION INTRAVENOUS at 12:56

## 2017-01-30 RX ADMIN — SODIUM CHLORIDE, POTASSIUM CHLORIDE, SODIUM LACTATE AND CALCIUM CHLORIDE 1000 ML: 600; 310; 30; 20 INJECTION, SOLUTION INTRAVENOUS at 07:55

## 2017-01-30 RX ADMIN — BUPIVACAINE HYDROCHLORIDE AND EPINEPHRINE BITARTRATE 50 ML: 2.5; .005 INJECTION, SOLUTION INFILTRATION; PERINEURAL at 12:32

## 2017-01-30 RX ADMIN — FENTANYL CITRATE 50 MCG: 50 INJECTION, SOLUTION INTRAMUSCULAR; INTRAVENOUS at 12:40

## 2017-01-30 RX ADMIN — MIDAZOLAM HYDROCHLORIDE 1 MG: 1 INJECTION, SOLUTION INTRAMUSCULAR; INTRAVENOUS at 09:09

## 2017-01-30 RX ADMIN — HYDROMORPHONE HYDROCHLORIDE 0.5 MG: 1 INJECTION, SOLUTION INTRAMUSCULAR; INTRAVENOUS; SUBCUTANEOUS at 12:55

## 2017-01-30 RX ADMIN — MIDAZOLAM HYDROCHLORIDE 1 MG: 1 INJECTION, SOLUTION INTRAMUSCULAR; INTRAVENOUS at 08:04

## 2017-01-30 RX ADMIN — LIDOCAINE HYDROCHLORIDE 40 MG: 20 INJECTION, SOLUTION INFILTRATION; PERINEURAL at 11:30

## 2017-01-30 RX ADMIN — FENTANYL CITRATE 25 MCG: 50 INJECTION, SOLUTION INTRAMUSCULAR; INTRAVENOUS at 11:43

## 2017-01-30 RX ADMIN — MIDAZOLAM HYDROCHLORIDE 2 MG: 1 INJECTION, SOLUTION INTRAMUSCULAR; INTRAVENOUS at 11:24

## 2017-01-30 RX ADMIN — FENTANYL CITRATE 50 MCG: 50 INJECTION, SOLUTION INTRAMUSCULAR; INTRAVENOUS at 11:37

## 2017-01-30 RX ADMIN — HYDROMORPHONE HYDROCHLORIDE 0.5 MG: 1 INJECTION, SOLUTION INTRAMUSCULAR; INTRAVENOUS; SUBCUTANEOUS at 13:07

## 2017-01-30 ASSESSMENT — LIFESTYLE VARIABLES: TOBACCO_USE: 1

## 2017-01-30 NOTE — IP AVS SNAPSHOT
MRN:[id number]                      After Visit Summary   1/30/2017    Tere Camarena    MRN: [id number]           Thank you!     Thank you for choosing [facility] for your care. Our goal is always to provide you with excellent care. Hearing back from our patients is one way we can continue to improve our services. Please take a few minutes to complete the written survey that you may receive in the mail after you visit with us. Thank you!        Patient Information     Date Of Birth          1975        About your hospital stay     You were admitted on:  January 30, 2017 You last received care in the:  [facility] Preop/Phase II    You were discharged on:  January 30, 2017       Who to Call     For medical emergencies, please call 911.  For non-urgent questions about your medical care, please call your primary care provider or clinic, [phone]  For questions related to your surgery, please call your surgery clinic        Attending Provider     Provider    Sita Rushing MD       Primary Care Provider Office Phone # Fax #    Yaneth SURESH Cash [phone]       [facility] [place] [address]  [place] [address]        Your next 10 appointments already scheduled     Feb 07, 2017 11:00 AM   (Arrive by 10:45 AM)   Post Op with Sita Rushing MD   [facility] [place] ([facility] [place] [facility])    [address]  [place] [address]   [phone]              Further instructions from your care team       Leave your dressing in place for 48 hours or until Thursday morning.  You may then remove the gauze dressing but leave the steri strips applied directly to the skin in place.    Following dressing removal, you may shower but do not directly soap or scrub the incision site.  An appointment for wound check and to go over the pathology will be made for one week (Tuesday, 2/6/17).    Bra support 24 hours a day for 2-3 weeks after breast biopsy aides in maximizing the cosmetic  result and we suggest that you use a supportive bra day and night for that time.  A prescription for pain medicine has been provided.  These are very constipating agents so metamucil or some fiber therapy is suggested to avoid constipation.        Please contact the office or return for questions, concerns or new complaints.  Our direct line is [phone] (nurse) or Rhianna () at [phone]      You may return to all activities without restriction.  Thank you for entrusting your care to [facility] and the Surgical Services Department.          Post-Anesthesia Patient Instructions    IMMEDIATELY FOLLOWING SURGERY:  Do not drive or operate machinery for the first twenty four hours after surgery.  Do not make any important decisions for twenty four hours after surgery or while taking narcotic pain medications or sedatives.  If you develop intractable nausea and vomiting or a severe headache please notify your doctor immediately.    FOLLOW-UP:  Please make an appointment with your surgeon as instructed. You do not need to follow up with anesthesia unless specifically instructed to do so.    WOUND CARE INSTRUCTIONS (if applicable):  Keep a dry clean dressing on the anesthesia/puncture wound site if there is drainage.  Once the wound has quit draining you may leave it open to air.  Generally you should leave the bandage intact for twenty four hours unless there is drainage.  If the epidural site drains for more than 36-48 hours please call the anesthesia department.    QUESTIONS?:  Please feel free to call your physician or the hospital  if you have any questions, and they will be happy to assist you.       Remove the scopolamine patch behind your left ear after 24 hours after application.   After removing the patch, wash your hands and the area behind your ear thoroughly with soap and water.   The patch will still contain some medicine after use.   To avoid accidental contact or ingestion by  "children or pets, fold the used patch in half with the sticky side together and throw away in the trash out of the reach of children and pets.       Pending Results     Date and Time Order Name Status Description    1/30/2017 1201 Surgical pathology exam In process             Admission Information        Provider Department Dept Phone    1/30/2017 Sita Rushing MD Hi Preop/Phase [phone]      Your Vitals Were     Blood Pressure Pulse Temperature Respirations    102/75 mmHg 71 96.9  F (36.1  C) (Oral) 21    Height Weight BMI (Body Mass Index) Pulse Oximetry    1.727 m (5' 8\") 86.183 kg (190 lb) 28.90 kg/m2 98%    Last Period             01/10/2017         [employer] Information     [employer] gives you secure access to your electronic health record. If you see a primary care provider, you can also send messages to your care team and make appointments. If you have questions, please call your primary care clinic.  If you do not have a primary care provider, please call [phone] and they will assist you.        Care EveryWhere ID     This is your Care EveryWhere ID. This could be used by other organizations to access your [facility] medical records  [id number]           Review of your medicines      CONTINUE these medicines which have NOT CHANGED        Dose / Directions    HYDROcodone-acetaminophen 5-325 MG per tablet   Commonly known as:  NORCO   Used for:  Status post left breast biopsy        Dose:  1 tablet   Take 1 tablet by mouth every 4 hours as needed for moderate to severe pain 1-2 tablets as need for pain every 4 hours.   Quantity:  30 tablet   Refills:  0       hydrocortisone 2.5 % cream   Commonly known as:  ANUSOL-HC   Used for:  External hemorrhoids        Place rectally 2 times daily 1 tube   Quantity:  30 g   Refills:  2       ibuprofen 800 MG tablet   Commonly known as:  ADVIL/MOTRIN   Used for:  Lumbar radiculopathy        Dose:  800 mg   Take 1 tablet (800 mg) by mouth every 8 hours as " needed for moderate pain   Quantity:  60 tablet   Refills:  1       ketotifen 0.025 % Soln ophthalmic solution   Commonly known as:  ZADITOR/REFRESH ANTI-ITCH   Used for:  Allergic state, initial encounter        Dose:  1 drop   Place 1 drop into both eyes 2 times daily   Quantity:  1 Bottle   Refills:  0            Where to get your medicines      Some of these will need a paper prescription and others can be bought over the counter. Ask your nurse if you have questions.     Bring a paper prescription for each of these medications    - HYDROcodone-acetaminophen 5-325 MG per tablet             Protect others around you: Learn how to safely use, store and throw away your medicines at www.disposemymeds.org.             Medication List: This is a list of all your medications and when to take them. Check marks below indicate your daily home schedule. Keep this list as a reference.      Medications           Morning Afternoon Evening Bedtime As Needed    HYDROcodone-acetaminophen 5-325 MG per tablet   Commonly known as:  NORCO   Take 1 tablet by mouth every 4 hours as needed for moderate to severe pain 1-2 tablets as need for pain every 4 hours.                                hydrocortisone 2.5 % cream   Commonly known as:  ANUSOL-HC   Place rectally 2 times daily 1 tube                                ibuprofen 800 MG tablet   Commonly known as:  ADVIL/MOTRIN   Take 1 tablet (800 mg) by mouth every 8 hours as needed for moderate pain                                ketotifen 0.025 % Soln ophthalmic solution   Commonly known as:  ZADITOR/REFRESH ANTI-ITCH   Place 1 drop into both eyes 2 times daily

## 2017-01-30 NOTE — IP AVS SNAPSHOT
HI Preop/Phase II    [address] [address]    Phone:  [phone]                                       After Visit Summary   1/30/2017    Tere Camarena    MRN: [id number]           After Visit Summary Signature Page     I have received my discharge instructions, and my questions have been answered. I have discussed any challenges I see with this plan with the nurse or doctor.    ..........................................................................................................................................  Patient/Patient Representative Signature      ..........................................................................................................................................  Patient Representative Print Name and Relationship to Patient    ..................................................               ................................................  Date                                            Time    ..........................................................................................................................................  Reviewed by Signature/Title    ...................................................              ..............................................  Date                                                            Time

## 2017-01-30 NOTE — ANESTHESIA CARE TRANSFER NOTE
Patient: Tere Camarena    BIOPSY BREAST NEEDLE LOCALIZATION (Left Breast)  Additional InformationProcedure(s):  WIRE LOCALIZED EXCISIONAL BIOPSY LEFT BREAST MASS - Wound Class: I-Clean    Diagnosis: BREAST MASS, LEFT  Diagnosis Additional Information: No value filed.    Anesthesia Type:   General, LMA     Note:  Airway :Nasal Cannula  Patient transferred to:PACU  Comments: Anesthesia Care Transfer Note    Patient: Tere Camarena    Transferred to: PACU    Patient vital signs: Stable    Airway: None    To PACU on supplemental O2.  Placed on monitor.  Report given to RN, all questions answered.  VSS.    Heath Shelley CRNA  1/30/2017      Vitals: (Last set prior to Anesthesia Care Transfer)              Electronically Signed By: WANDA Castaneda CRNA  January 30, 2017  12:30 PM

## 2017-01-30 NOTE — H&P (VIEW-ONLY)
[address]  [facility] [address]  [phone]  Dept: [phone]    PRE-OP EVALUATION:  Today's date: 2017    Tere Camarena (: 1975) presents for pre-operative evaluation assessment as requested by Dr. Rushing.  She requires evaluation and anesthesia risk assessment prior to undergoing surgery/procedure for treatment of left breast mass .  Proposed procedure: left breast wire localized excisoinal bx    Date of Surgery/ Procedure: 17    Hospital/Surgical Facility: [facility]    Primary Physician: Yaneth Ochoa  Type of Anesthesia Anticipated: to be determined    Patient has a Health Care Directive or Living Will:  NO    1. NO - Do you have a history of heart attack, stroke, stent, bypass or surgery on an artery in the head, neck, heart or legs?  2. NO - Do you ever have any pain or discomfort in your chest?  3. NO - Do you have a history of  Heart Failure?  4. NO - Are you troubled by shortness of breath when: walking on the level, up a slight hill or at night?  5. NO - Do you currently have a cold, bronchitis or other respiratory infection?  6. NO - Do you have a cough, shortness of breath or wheezing?  7. NO - Do you sometimes get pains in the calves of your legs when you walk?  8. NO - Do you or anyone in your family have previous history of blood clots?  9. NO - Do you or does anyone in your family have a serious bleeding problem such as prolonged bleeding following surgeries or cuts?  10. YES - HAVE YOU EVER HAD PROBLEMS WITH ANEMIA OR BEEN TOLD TO TAKE IRON PILLS? When pregnant  11. YES - HAVE YOU HAD ANY ABNORMAL BLOOD LOSS SUCH AS BLACK, TARRY OR BLOODY STOOLS, OR ABNORMAL VAGINAL BLEEDING? Pt has current irregular periods and blood in stools  12. NO - Have you ever had a blood transfusion?  13. NO - Have you or any of your relatives ever had problems with anesthesia?  14. NO - Do you have sleep apnea, excessive snoring or daytime drowsiness?  15. NO - Do you  have any prosthetic heart valves?  16. NO - Do you have prosthetic joints?  17. NO - Is there any chance that you may be pregnant?      HPI:                                                      Brief HPI related to upcoming procedure: Breast mass found on mammogram.      See problem list for active medical problems.  Problems all longstanding and stable, except as noted/documented.  See ROS for pertinent symptoms related to these conditions.                                                                                                  .    MEDICAL HISTORY:                                                      Patient Active Problem List    Diagnosis Date Noted     ACP (advance care planning) 06/30/2016     Priority: Medium     Advance Care Planning 6/30/2016: ACP Review of Chart / Resources Provided:  Reviewed chart for advance care plan.  Tere Camarena has no plan or code status on file. Discussed available resources and provided with information. Confirmed code status reflects current choices pending further ACP discussions.  Confirmed/documented legally designated decision makers.  Added by Mitra Adamson              History reviewed. No pertinent past medical history.  Past Surgical History   Procedure Laterality Date     Abdomen surgery       hernia surgery X5     Gyn surgery  2004     tubal ligation     Breast surgery Left 09/28/2016     biopsy     Breast surgery Left 09/13/2016     biopsy     Orthopedic surgery Left [id number]     left knee medial retinacular repair, VMO advancement, and reapir of partial tear of quadriceps tendon     Current Outpatient Prescriptions   Medication Sig Dispense Refill     ketotifen (ZADITOR/REFRESH ANTI-ITCH) 0.025 % SOLN ophthalmic solution Place 1 drop into both eyes 2 times daily 1 Bottle 0     HYDROcodone-acetaminophen (NORCO) 5-325 MG per tablet Take 1 tablet by mouth every 4 hours as needed for moderate to severe pain 1-2 tablets as need for pain every 4 hours.        olopatadine (PATANOL) 0.1 % ophthalmic solution Place 1 drop into both eyes 2 times daily 5 mL 3     hydrocortisone (ANUSOL-HC) 2.5 % rectal cream Place rectally 2 times daily 1 tube 30 g 2     ibuprofen (ADVIL,MOTRIN) 800 MG tablet Take 1 tablet (800 mg) by mouth every 8 hours as needed for moderate pain 60 tablet 1     OTC products: None, except as noted above    Allergies   Allergen Reactions     Peanuts [Peanut Oil]      Eyes swell     Trees Other (See Comments)     Tree sap makes her eyes swell shut      Latex Allergy: NO    Social History   Substance Use Topics     Smoking status: Current Every Day Smoker -- 0.50 packs/day for 11 years     Smokeless tobacco: Never Used      Comment: trying on her own     Alcohol Use: Yes      Comment: twice a week     History   Drug Use No       REVIEW OF SYSTEMS:                                                    C: NEGATIVE for fever, chills, change in weight  I: NEGATIVE for worrisome rashes, moles or lesions  E: NEGATIVE for vision changes or irritation  E/M: NEGATIVE for ear, mouth and throat problems  R: NEGATIVE for significant cough or SOB  CV: NEGATIVE for chest pain, palpitations or peripheral edema  GI: NEGATIVE for nausea, abdominal pain, heartburn, or change in bowel habits  : NEGATIVE for frequency, dysuria, or hematuria  M: NEGATIVE for significant arthralgias or myalgia  N: NEGATIVE for weakness, dizziness or paresthesias  E: NEGATIVE for temperature intolerance, skin/hair changes  H: NEGATIVE for bleeding problems  P: NEGATIVE for changes in mood or affect    EXAM:                                                    [facility] 01/10/2017    GENERAL APPEARANCE: healthy, alert and no distress     EYES: EOMI,- PERRL     HENT: ear canals and TM's normal and nose and mouth without ulcers or lesions     NECK: no adenopathy, no asymmetry, masses, or scars and thyroid normal to palpation     RESP: lungs clear to auscultation - no rales, rhonchi or wheezes     CV: regular  rates and rhythm, normal S1 S2, no S3 or S4 and no murmur, click or rub -     ABDOMEN:  soft, nontender, no HSM or masses and bowel sounds normal     SKIN: no suspicious lesions or rashes     NEURO: Normal strength and tone, sensory exam grossly normal, mentation intact and speech normal     PSYCH: mentation appears normal. and affect normal/bright     LYMPHATICS: No axillary, cervical, inguinal, or supraclavicular nodes    DIAGNOSTICS:                                                      Results for orders placed or performed in visit on 01/27/17   CBC with platelets   Result Value Ref Range    WBC 6.1 4.0 - 11.0 10e9/L    RBC Count 3.91 3.8 - 5.2 10e12/L    Hemoglobin 12.9 11.7 - 15.7 g/dL    Hematocrit 37.8 35.0 - 47.0 %    MCV 97 78 - 100 fl    MCH 33.0 26.5 - 33.0 pg    MCHC 34.1 31.5 - 36.5 g/dL    RDW 12.6 10.0 - 15.0 %    Platelet Count 195 150 - 450 10e9/L   Basic metabolic panel   Result Value Ref Range    Sodium 141 133 - 144 mmol/L    Potassium 3.6 3.4 - 5.3 mmol/L    Chloride 108 94 - 109 mmol/L    Carbon Dioxide 23 20 - 32 mmol/L    Anion Gap 10 3 - 14 mmol/L    Glucose 83 70 - 99 mg/dL    Urea Nitrogen 8 7 - 30 mg/dL    Creatinine 0.58 0.52 - 1.04 mg/dL    GFR Estimate >90  Non  GFR Calc   >60 mL/min/1.7m2    GFR Estimate If Black >90   GFR Calc   >60 mL/min/1.7m2    Calcium 8.3 (L) 8.5 - 10.1 mg/dL   TSH   Result Value Ref Range    TSH 1.75 0.40 - 4.00 mU/L   HCG qualitative urine   Result Value Ref Range    HCG Qual Urine Negative NEG         Recent Labs   Lab Test  04/08/16   0954  11/11/15   1218   HGB  15.6  14.4   PLT  226  240   INR   --   0.90   NA  138  138   POTASSIUM  3.5  3.7   CR  0.47*  0.57   A1C  4.9   --         IMPRESSION:                                                      (Z01.818) Preop general physical exam  (primary encounter diagnosis)          RECOMMENDATIONS:                                                    Patient had a normal exam today.  She is cleared for surgery 1030-17, [facility], Dr. Rushing.       Signed Electronically by: SURESH Garcia    Copy of this evaluation report is provided to requesting physician.    [place] Preop Guidelines

## 2017-01-30 NOTE — ANESTHESIA PREPROCEDURE EVALUATION
Anesthesia Evaluation     . Pt has had prior anesthetic.     No history of anesthetic complications     ROS/MED HX    ENT/Pulmonary:     (+)tobacco use, Current use 0.5 PPD packs/day  , . .    Neurologic:  - neg neurologic ROS     Cardiovascular:  - neg cardiovascular ROS       METS/Exercise Tolerance:     Hematologic:  - neg hematologic  ROS       Musculoskeletal:   (+) , , other musculoskeletal- Left Breast Mass      GI/Hepatic:  - neg GI/hepatic ROS       Renal/Genitourinary:  - ROS Renal section negative       Endo:     (+) Obesity, .      Psychiatric:  - neg psychiatric ROS       Infectious Disease:  - neg infectious disease ROS       Malignancy:      - no malignancy   Other: Comment: Significant EtOH Abuse hx (sober x 98 days)   (+) No chance of pregnancy              Physical Exam  Normal systems: cardiovascular and pulmonary    Airway   Mallampati: III  TM distance: >3 FB  Neck ROM: full    Dental   (+) chipped and missing    Cardiovascular   Rhythm and rate: regular and normal      Pulmonary    breath sounds clear to auscultation                    Anesthesia Plan      History & Physical Review  History and physical reviewed and following examination; no interval change.    ASA Status:  3 .    NPO Status:  > 8 hours    Plan for General and LMA with Intravenous and Propofol induction. Maintenance will be Balanced.    PONV prophylaxis:  Ondansetron (or other 5HT-3), Scopolamine patch and Dexamethasone or Solumedrol  1/27/2017 HCG: Negative      Postoperative Care  Postoperative pain management:  IV analgesics and Oral pain medications.      Consents  Anesthetic plan, risks, benefits and alternatives discussed with:  Patient..                          .

## 2017-01-30 NOTE — OP NOTE
PREOPERATIVE DIAGNOSIS:   Left breast radial scar, central breast.     POSTOPERATIVE DIAGNOSIS:   Left breast radial scar, central breast  PROCEDURE: Wire localized excision, left breast mass.    HISTORY: This 41 year old female underwent biopsy of the left breast on two occasions.  A radial scar was noted and wire localized excision indicated.   Localization wire was placed by Dr. Camara..  FINDINGS:   Specimen mammography confirmed the presence of the region in question with satisfactory gross margins demonstrated.  Both clips were included in the specimen.  As the lesion was located on the medial edge of the excision, an additional rim of tissue was excised to ensure completeness of excision.  PROCEDURE: With the patient in the supine position on the operating table with  localization wire in place in the left breast, general anesthesia was induced. The left breast, chest wall, axilla and upper arm were prepped and draped sterilely. The requisite timeout pause was then observed during which the patient's correct identity and planned procedure were confirmed and the preoperatively placed ink meghann denoting the left side was visualized. With concordance among the operating room staff, the procedure was commenced with a curvilinear incision along the superior border of the areola at the site of introduction of the localization wire. The incision was taken through full thickness skin to subcutaneous tissue and the nipple and areolar complex was elevated off the subcutaneous fat sharply with bleeding again controlled by electrocautery. Dissection was commenced circumferentially anterior to the investing breast fascia to allow complete excision. The wire and surrounding tissue was grasped with an Allis clamp and, using electrocautery, excised.  The location of the clips was visualized on the medial margin of the excision.  The specimen was removed and sutures placed to orient.  An additional rim of tissue was  excised at the site of the clip excision margin and submitted in juxtaposition to the main specimen on the radiology grid.   Specimen mammogram was obtained which confirmed excision of the region in question with both localization clips from prior biopsy included.  The wound was irrigated with antibiotic/saline solution and fastidious hemostasis obtained with electrocautery and ties of 3-0 Vicryl. The wound was anesthetized with 0.25% Marcaine with epinephrine to assist in managing postoperative pain.   The wound was closed with interrupted 3-0 Vicryl in the subcutaneous tissue,  and the skin closure was effected subcuticular 3-0 Vicryl. Steri-Strips and sterile dressings were applied and the patient was transported to the recovery room in satisfactory condition.  The estimated blood loss was less than 10 mL and there were no apparent complications; the procedure was well tolerated and the patient left the operating room in good condition upon confirmation that the final sponge, needle and instrument counts were correct.    The post surgical debriefing was held and acknowledged prior to patient transport.    Sita Rushing MD, FACS

## 2017-01-30 NOTE — DISCHARGE INSTRUCTIONS
Leave your dressing in place for 48 hours or until Thursday morning.  You may then remove the gauze dressing but leave the steri strips applied directly to the skin in place.    Following dressing removal, you may shower but do not directly soap or scrub the incision site.  An appointment for wound check and to go over the pathology will be made for one week (Tuesday, 2/6/17).    Bra support 24 hours a day for 2-3 weeks after breast biopsy aides in maximizing the cosmetic result and we suggest that you use a supportive bra day and night for that time.  A prescription for pain medicine has been provided.  These are very constipating agents so metamucil or some fiber therapy is suggested to avoid constipation.        Please contact the office or return for questions, concerns or new complaints.  Our direct line is [phone] (nurse) or Rhianna () at [phone]      You may return to all activities without restriction.  Thank you for entrusting your care to [facility] and the Surgical Services Department.          Post-Anesthesia Patient Instructions    IMMEDIATELY FOLLOWING SURGERY:  Do not drive or operate machinery for the first twenty four hours after surgery.  Do not make any important decisions for twenty four hours after surgery or while taking narcotic pain medications or sedatives.  If you develop intractable nausea and vomiting or a severe headache please notify your doctor immediately.    FOLLOW-UP:  Please make an appointment with your surgeon as instructed. You do not need to follow up with anesthesia unless specifically instructed to do so.    WOUND CARE INSTRUCTIONS (if applicable):  Keep a dry clean dressing on the anesthesia/puncture wound site if there is drainage.  Once the wound has quit draining you may leave it open to air.  Generally you should leave the bandage intact for twenty four hours unless there is drainage.  If the epidural site drains for more than 36-48 hours please  call the anesthesia department.    QUESTIONS?:  Please feel free to call your physician or the hospital  if you have any questions, and they will be happy to assist you.       Remove the scopolamine patch behind your left ear after 24 hours after application.   After removing the patch, wash your hands and the area behind your ear thoroughly with soap and water.   The patch will still contain some medicine after use.   To avoid accidental contact or ingestion by children or pets, fold the used patch in half with the sticky side together and throw away in the trash out of the reach of children and pets.

## 2017-01-30 NOTE — H&P (VIEW-ONLY)
[facility]    CC:  Radial scar on stereotactic biopsy    HPI:  This 41 year old year old female returns in followup for consideration of excision of her left breast lesion.  A radial scar was identified and excision indicated.  She injured her left knee cap and had extensive orthopedic intervention with a left knee ligamentous repair with patellar reattachment.  She has had a long recuperative period but is now ready to proceed with the wire localized biopsy.    History reviewed. No pertinent past medical history.  Past Surgical History   Procedure Laterality Date     Abdomen surgery       hernia surgery X5     Gyn surgery  2004     tubal ligation     Breast surgery Left 09/28/2016     biopsy     Breast surgery Left 09/13/2016     biopsy     Orthopedic surgery Left [id number]     left knee medial retinacular repair, VMO advancement, and reapir of partial tear of quadriceps tendon     Current Outpatient Prescriptions   Medication     HYDROcodone-acetaminophen (NORCO) 5-325 MG per tablet     olopatadine (PATANOL) 0.1 % ophthalmic solution     hydrocortisone (ANUSOL-HC) 2.5 % rectal cream     ibuprofen (ADVIL,MOTRIN) 800 MG tablet     No current facility-administered medications for this visit.     Allergies   Allergen Reactions     Peanuts [Peanut Oil]      Eyes swell     Trees Other (See Comments)     Tree sap makes her eyes swell shut     HABITS:    Social History   Substance Use Topics     Smoking status: Current Every Day Smoker -- 0.50 packs/day for 11 years     Smokeless tobacco: Never Used      Comment: trying on her own     Alcohol Use: Yes      Comment: twice a week       Family History   Problem Relation Age of Onset     Other - See Comments Mother      back surgery     Other Cancer Father      skin cancer     Hypertension Father      GASTROINTESTINAL DISEASE Father      Diverticulosis       REVIEW OF SYSTEMS:  Ten point review of systems negative except those mentioned in the HPI.  "    OBJECTIVE:    /60 mmHg  Pulse 58  Temp(Src) 98.6  F (37  C) (Tympanic)  Resp 20  Ht 5' 8\" (1.727 m)  Wt 180 lb (81.647 kg)  BMI 27.38 kg/m2  SpO2 98%    GENERAL: Generally appears well, in no distress with appropriate affect.  HEENT:   Sclerae anicteric - No cervical, supra/infraclavciular lymphadenopathy, no thyroid masses  Respiratory:  Lungs clear to ausculation bilaterally with good air excursion  Cardiovascular:  Regular Rate and Rhythm with no murmurs gallops or rubs, normal   Breasts:  Tattoo left with two healed biopsy sites, one laterally which by history represents the site revealing the radial scar and one superiorly which was the site of the original ultrasound biopsy which did not show abnormality consistent with the mammographic appearance - hence the re-biopsy.  :  deferred  Extremities:  Extremities normal. No deformities, edema, or skin discoloration.  Skin:  no suspicious lesions or rashes  Neurological: grossly intact    Psych:  Alert, oriented, affect appropriate with normal decision making ability.    IMPRESSION:  For wire localized excision, left breast mass, radial scar as malignant potential dictates excision.    PLAN:  Risk, benefits, technical aspects outlined with questions asked and answered.    Sita Rushing MD, FACS    1/17/2017  10:26 AM    cc:  Yaneth Ochoa NP  "

## 2017-01-30 NOTE — OR NURSING
Patient and responsible adult given discharge instructions with no questions regarding instructions. Andres score 19. Pain level 3/10.  Discharged from unit via wheelchair. Patient discharged to home with mother.

## 2017-01-30 NOTE — INTERVAL H&P NOTE
History and physical reviewed. Patient examined. There is no interval change in condition since examined by me 1-17-17.    Sita Rushing MD, FACS    1/30/2017  9:07 AM

## 2017-01-30 NOTE — OR NURSING
Pt awake and talking, appears comfortable but states pain consistently 6-7/10 even with fentanyl and dilaudid meds given.

## 2017-01-30 NOTE — PROGRESS NOTES
Tere REX Camarena 41 year old    [facility]ed from [facility]  Exam Performed : Breast wire localization left  Pushed to Reading Service?: No  Tolerated Procedure : well  May resume previous diet : Yes  Time Returned to Unit : 1039  Report Given to : Michelle GIANG    1/30/2017 12:46 PM   Emma Fitch

## 2017-01-31 NOTE — TELEPHONE ENCOUNTER
Received an approval letter from [facility] for Prior Auth on Patanol Op 0.1% Sol (Olopatadine opthalmic solution 0.1%).  This Authorization will be valid from 1/26/17 through 1/25/18.  Approval letter and Prior Auth form was sent to scanning to be entered into patient's chart.  The letter was also faxed to [facility] and I notified the patient that it was approved.  Rosalie Romero LPN

## 2017-01-31 NOTE — ANESTHESIA POSTPROCEDURE EVALUATION
Patient: Tere Camarena    BIOPSY BREAST NEEDLE LOCALIZATION (Left Breast)  Additional InformationProcedure(s):  WIRE LOCALIZED EXCISIONAL BIOPSY LEFT BREAST MASS - Wound Class: I-Clean    Diagnosis:BREAST MASS, LEFT  Diagnosis Additional Information: No value filed.    Anesthesia Type:  General, LMA    Note:  Anesthesia Post Evaluation    Patient location during evaluation: PACU, Phase 2 and Bedside  Patient participation: Able to fully participate in evaluation  Level of consciousness: awake and alert  Pain management: adequate  Airway patency: patent  Cardiovascular status: acceptable  Respiratory status: acceptable  Hydration status: stable  PONV: none     Anesthetic complications: None          Last vitals:  Filed Vitals:    01/30/17 1350 01/30/17 1355 01/30/17 1400   BP:   102/60   Pulse:      Temp:   98.6  F (37  C)   Resp:   16   SpO2: 97% 94% 95%       Electronically Signed By: Josiah Verma MD  January 31, 2017  7:20 AM

## 2017-02-09 LAB — COPATH REPORT: NORMAL

## 2017-02-22 ENCOUNTER — OFFICE VISIT (OUTPATIENT)
Dept: SURGERY | Facility: OTHER | Age: 42
End: 2017-02-22
Attending: SURGERY
Payer: COMMERCIAL

## 2017-02-22 VITALS
HEART RATE: 74 BPM | DIASTOLIC BLOOD PRESSURE: 70 MMHG | TEMPERATURE: 98.8 F | WEIGHT: 185 LBS | SYSTOLIC BLOOD PRESSURE: 116 MMHG | HEIGHT: 68 IN | BODY MASS INDEX: 28.04 KG/M2 | OXYGEN SATURATION: 98 %

## 2017-02-22 DIAGNOSIS — Z98.890 STATUS POST LEFT BREAST LUMPECTOMY: Primary | ICD-10-CM

## 2017-02-22 DIAGNOSIS — N60.12 FIBROCYSTIC CHANGE OF BREAST, LEFT: ICD-10-CM

## 2017-02-22 PROCEDURE — 99212 OFFICE O/P EST SF 10 MIN: CPT

## 2017-02-22 PROCEDURE — 99024 POSTOP FOLLOW-UP VISIT: CPT | Performed by: SURGERY

## 2017-02-22 ASSESSMENT — PAIN SCALES - GENERAL: PAINLEVEL: NO PAIN (0)

## 2017-02-22 NOTE — NURSING NOTE
"Chief Complaint   Patient presents with     Surgical Followup     status post wire localized excision, left breast mass 1/30/17       Initial /70  Pulse 74  Temp 98.8  F (37.1  C) (Tympanic)  Ht 5' 8\" (1.727 m)  Wt 185 lb (83.9 kg)  LMP 01/10/2017  SpO2 98%  BMI 28.13 kg/m2 Estimated body mass index is 28.13 kg/(m^2) as calculated from the following:    Height as of this encounter: 5' 8\" (1.727 m).    Weight as of this encounter: 185 lb (83.9 kg).  Medication Reconciliation: complete   RACHEL DE LUNA      "

## 2017-02-22 NOTE — MR AVS SNAPSHOT
After Visit Summary   2/22/2017    Tere Camarena    MRN: [id number]           Patient Information     Date Of Birth          1975        Visit Information        Provider Department      2/22/2017 10:30 AM Sita Rushing MD [facility] [place]        Today's Diagnoses     Status post left breast lumpectomy    -  1      Care Instructions    Thank you for allowing Dr. Rushing and our surgical team to participate in your care.  If you have a scheduling or an appointment question please contact Rhianna, our Health Unit Coordinator at her direct line [phone].   ALL nursing questions or concerns can be directed to your surgical nurse at: [phone]-heather        Follow-ups after your visit        Who to contact     If you have questions or need follow up information about today's clinic visit or your schedule please contact [facility] directly at [phone].  Normal or non-critical lab and imaging results will be communicated to you by MyChart, letter or phone within 4 business days after the clinic has received the results. If you do not hear from us within 7 days, please contact the clinic through [facility] or phone. If you have a critical or abnormal lab result, we will notify you by phone as soon as possible.  Submit refill requests through [facility] or call your pharmacy and they will forward the refill request to us. Please allow 3 business days for your refill to be completed.          Additional Information About Your Visit        MyChart Information     [facility] gives you secure access to your electronic health record. If you see a primary care provider, you can also send messages to your care team and make appointments. If you have questions, please call your primary care clinic.  If you do not have a primary care provider, please call [phone] and they will assist you.        Care EveryWhere ID     This is your Care EveryWhere ID. This could be used by other  "organizations to access your [facility] medical records  [id number]        Your Vitals Were     Pulse Temperature Height Last Period Pulse Oximetry BMI (Body Mass Index)    74 98.8  F (37.1  C) (Tympanic) 5' 8\" (1.727 m) 01/10/2017 98% 28.13 kg/m2       Blood Pressure from Last 3 Encounters:   02/22/17 116/70   01/30/17 102/60   01/27/17 107/66    Weight from Last 3 Encounters:   02/22/17 185 lb (83.9 kg)   01/30/17 190 lb (86.2 kg)   01/27/17 198 lb 3.2 oz (89.9 kg)              Today, you had the following     No orders found for display       Primary Care Provider Office Phone # Fax #    SURESH Gil [phone]       [facility] [place] [address] [facility] [facility] [address] [facility]  [place] [address]        Thank you!     [facility] you [facility]  for your care. Our goal is always to provide you with excellent care. Hearing back from our patients is one way we can continue to improve our services. Please take a few minutes to complete the written survey that you may receive in the mail after your visit with us. Thank you!             Your Updated Medication List - Protect others around you: Learn how to safely use, store and throw away your medicines at www.disposemymeds.org.          This list is accurate as of: 2/22/17 10:55 AM.  Always use your most recent med list.                   Brand Name Dispense Instructions for use    HYDROcodone-acetaminophen 5-325 MG per tablet    NORCO    30 tablet    Take 1 tablet by mouth every 4 hours as needed for moderate to severe pain 1-2 tablets as need for pain every 4 hours.       hydrocortisone 2.5 % cream    ANUSOL-HC    30 g    Place rectally 2 times daily 1 tube       ibuprofen 800 MG tablet    ADVIL/MOTRIN    60 tablet    Take 1 tablet (800 mg) by mouth every 8 hours as needed for moderate pain       ketotifen 0.025 % Soln ophthalmic solution    ZADITOR/REFRESH ANTI-ITCH    1 Bottle    Place 1 drop into both eyes 2 times daily         "

## 2017-02-22 NOTE — PATIENT INSTRUCTIONS
Thank you for allowing Dr. Rushing and our surgical team to participate in your care.  If you have a scheduling or an appointment question please contact Rhianna, our Health Unit Coordinator at her direct line [phone].   ALL nursing questions or concerns can be directed to your surgical nurse at: [phone]

## 2017-03-07 NOTE — PROGRESS NOTES
"  HPI:  Returns for  post surgical examination following wire localized excision of left breast lesion felt consistent with a radial scar 1/30/17 without complaint.  Denies wound complication, fever, chills, nausea or vomiting.  She is pleased with the procedure and breast contour following excision.  ROS:   10 point ROS neg other than the symptoms noted above in the HPI.    Examination:  /70  Pulse 74  Temp 98.8  F (37.1  C) (Tympanic)  Ht 5' 8\" (1.727 m)  Wt 185 lb (83.9 kg)  LMP 01/10/2017  SpO2 98%  BMI 28.13 kg/m2    Constitutional: healthy, alert and no distress  HEENT:  No obvious masses, lesions,  or abnormalities     Wound healing nicely without evidence of infection.  Good contour and cosmetic result.  Impression:  Satisfactory course.  Recommendations:  The technical aspects of the procedure and operative findings were reviewed.  She was again reminding in the need to refrain from heavy lifting and strenuous activity for a total of 6 weeks following this procedure.      Sita Rushing MD, FACS              "

## 2017-03-20 DIAGNOSIS — Z72.0 TOBACCO ABUSE: Primary | ICD-10-CM

## 2017-03-20 RX ORDER — NICOTINE 21 MG/24HR
1 PATCH, TRANSDERMAL 24 HOURS TRANSDERMAL EVERY 24 HOURS
Qty: 14 PATCH | Refills: 0 | Status: SHIPPED | OUTPATIENT
Start: 2017-03-20 | End: 2017-04-25

## 2017-03-20 NOTE — TELEPHONE ENCOUNTER
I called the pt she would like Nicotine patches and smokes 3/4 - 1 ppd. Please sign the smart set with the Rx order.

## 2017-03-20 NOTE — TELEPHONE ENCOUNTER
Reason for call:  Medication    1. Medication Name? New med request; medication to help quit smoking  2. Is this request for a refill? No  3. What Pharmacy do you use? unknown  4. Have you contacted your pharmacy? N/a  5. If yes, when?  (Please note that the turn-around-time for prescriptions is 72 business hours; I am sending your request at this time. SEND TO  [facility]  )  Description: Pt called to have a new prescription made to help pt quit smoking.  Pt was not sure if an appt needed to be prior to this request.  Nurse please advise.  Pt is aware SHRAVAN Ochoa is out today.  Was an appointment offered for this a call? No   Preferred method for responding to this messageTelephone Call: [phone]  If we cannot reach you directly, may we leave a detailed response at the number you provided? Yes  Can this message wait until your PCP/Provider returns if not available today? Yes, pt is aware that PCP Don is out today but is requesting a covering nurse/provider to return the call.

## 2017-04-25 ENCOUNTER — HOSPITAL ENCOUNTER (EMERGENCY)
Facility: HOSPITAL | Age: 42
Discharge: HOME | End: 2017-04-25
Attending: EMERGENCY MEDICINE | Admitting: EMERGENCY MEDICINE
Payer: COMMERCIAL

## 2017-04-25 VITALS
OXYGEN SATURATION: 95 % | TEMPERATURE: 98.3 F | SYSTOLIC BLOOD PRESSURE: 128 MMHG | RESPIRATION RATE: 18 BRPM | DIASTOLIC BLOOD PRESSURE: 77 MMHG

## 2017-04-25 DIAGNOSIS — R60.9 POSTOPERATIVE EDEMA: ICD-10-CM

## 2017-04-25 DIAGNOSIS — H10.33 ACUTE BACTERIAL CONJUNCTIVITIS OF BOTH EYES: ICD-10-CM

## 2017-04-25 DIAGNOSIS — R60.0 BILATERAL EDEMA OF LOWER EXTREMITY: ICD-10-CM

## 2017-04-25 LAB
ALBUMIN SERPL-MCNC: 3.2 G/DL (ref 3.4–5)
ALP SERPL-CCNC: 68 U/L (ref 40–150)
ALT SERPL W P-5'-P-CCNC: 52 U/L (ref 0–50)
ANION GAP SERPL CALCULATED.3IONS-SCNC: 9 MMOL/L (ref 3–14)
AST SERPL W P-5'-P-CCNC: 36 U/L (ref 0–45)
BASOPHILS # BLD AUTO: 0 10E9/L (ref 0–0.2)
BASOPHILS NFR BLD AUTO: 0.7 %
BILIRUB SERPL-MCNC: 0.3 MG/DL (ref 0.2–1.3)
BUN SERPL-MCNC: 6 MG/DL (ref 7–30)
CALCIUM SERPL-MCNC: 8.2 MG/DL (ref 8.5–10.1)
CHLORIDE SERPL-SCNC: 105 MMOL/L (ref 94–109)
CO2 SERPL-SCNC: 25 MMOL/L (ref 20–32)
CREAT SERPL-MCNC: 0.77 MG/DL (ref 0.52–1.04)
DIFFERENTIAL METHOD BLD: NORMAL
EOSINOPHIL # BLD AUTO: 0.1 10E9/L (ref 0–0.7)
EOSINOPHIL NFR BLD AUTO: 2.4 %
ERYTHROCYTE [DISTWIDTH] IN BLOOD BY AUTOMATED COUNT: 12.7 % (ref 10–15)
GFR SERPL CREATININE-BSD FRML MDRD: 82 ML/MIN/1.7M2
GLUCOSE SERPL-MCNC: 82 MG/DL (ref 70–99)
HCT VFR BLD AUTO: 37.9 % (ref 35–47)
HGB BLD-MCNC: 12.9 G/DL (ref 11.7–15.7)
IMM GRANULOCYTES # BLD: 0 10E9/L (ref 0–0.4)
IMM GRANULOCYTES NFR BLD: 0.2 %
LYMPHOCYTES # BLD AUTO: 2.1 10E9/L (ref 0.8–5.3)
LYMPHOCYTES NFR BLD AUTO: 36.5 %
MCH RBC QN AUTO: 32.7 PG (ref 26.5–33)
MCHC RBC AUTO-ENTMCNC: 34 G/DL (ref 31.5–36.5)
MCV RBC AUTO: 96 FL (ref 78–100)
MONOCYTES # BLD AUTO: 0.5 10E9/L (ref 0–1.3)
MONOCYTES NFR BLD AUTO: 9.4 %
NEUTROPHILS # BLD AUTO: 2.9 10E9/L (ref 1.6–8.3)
NEUTROPHILS NFR BLD AUTO: 50.8 %
NRBC # BLD AUTO: 0 10*3/UL
NRBC BLD AUTO-RTO: 0 /100
NT-PROBNP SERPL-MCNC: 134 PG/ML (ref 0–450)
PLATELET # BLD AUTO: 218 10E9/L (ref 150–450)
POTASSIUM SERPL-SCNC: 3.9 MMOL/L (ref 3.4–5.3)
PROT SERPL-MCNC: 6.6 G/DL (ref 6.8–8.8)
RBC # BLD AUTO: 3.95 10E12/L (ref 3.8–5.2)
SODIUM SERPL-SCNC: 139 MMOL/L (ref 133–144)
T4 FREE SERPL-MCNC: 0.97 NG/DL (ref 0.76–1.46)
TSH SERPL DL<=0.05 MIU/L-ACNC: 3.17 MU/L (ref 0.4–4)
WBC # BLD AUTO: 5.8 10E9/L (ref 4–11)

## 2017-04-25 PROCEDURE — 99284 EMERGENCY DEPT VISIT MOD MDM: CPT | Performed by: EMERGENCY MEDICINE

## 2017-04-25 PROCEDURE — 99284 EMERGENCY DEPT VISIT MOD MDM: CPT | Mod: 25

## 2017-04-25 PROCEDURE — 80053 COMPREHEN METABOLIC PANEL: CPT | Performed by: EMERGENCY MEDICINE

## 2017-04-25 PROCEDURE — 83880 ASSAY OF NATRIURETIC PEPTIDE: CPT | Performed by: EMERGENCY MEDICINE

## 2017-04-25 PROCEDURE — 84443 ASSAY THYROID STIM HORMONE: CPT | Performed by: EMERGENCY MEDICINE

## 2017-04-25 PROCEDURE — 93971 EXTREMITY STUDY: CPT | Mod: TC,LT

## 2017-04-25 PROCEDURE — 85025 COMPLETE CBC W/AUTO DIFF WBC: CPT | Performed by: EMERGENCY MEDICINE

## 2017-04-25 PROCEDURE — 84439 ASSAY OF FREE THYROXINE: CPT | Performed by: EMERGENCY MEDICINE

## 2017-04-25 PROCEDURE — 36415 COLL VENOUS BLD VENIPUNCTURE: CPT | Performed by: EMERGENCY MEDICINE

## 2017-04-25 RX ORDER — FUROSEMIDE 40 MG
40 TABLET ORAL DAILY
Qty: 10 TABLET | Refills: 1 | Status: SHIPPED | OUTPATIENT
Start: 2017-04-25 | End: 2017-11-28

## 2017-04-25 RX ORDER — SULFACETAMIDE SODIUM 100 MG/ML
1 SOLUTION/ DROPS OPHTHALMIC
Qty: 1 BOTTLE | Refills: 0 | Status: SHIPPED | OUTPATIENT
Start: 2017-04-25 | End: 2017-04-30

## 2017-04-25 RX ORDER — LIDOCAINE 40 MG/G
CREAM TOPICAL
Status: DISCONTINUED | OUTPATIENT
Start: 2017-04-25 | End: 2017-04-25 | Stop reason: HOSPADM

## 2017-04-25 ASSESSMENT — ENCOUNTER SYMPTOMS
MYALGIAS: 1
EYE REDNESS: 1
EYE DISCHARGE: 1
ENDOCRINE NEGATIVE: 1
GASTROINTESTINAL NEGATIVE: 1
JOINT SWELLING: 1
RESPIRATORY NEGATIVE: 1
ACTIVITY CHANGE: 1
EYE ITCHING: 1

## 2017-04-25 NOTE — ED AVS SNAPSHOT
[facility]    [address] [address]    Phone:  [phone]                                       Tere Camarena   MRN: [id number]    Department:  [facility]   Date of Visit:  4/25/2017           After Visit Summary Signature Page     I have received my discharge instructions, and my questions have been answered. I have discussed any challenges I see with this plan with the nurse or doctor.    ..........................................................................................................................................  Patient/Patient Representative Signature      ..........................................................................................................................................  Patient Representative Print Name and Relationship to Patient    ..................................................               ................................................  Date                                            Time    ..........................................................................................................................................  Reviewed by Signature/Title    ...................................................              ..............................................  Date                                                            Time

## 2017-04-25 NOTE — ED NOTES
Reviewed discharge instructions with pt, verbalized understanding, no questions asked. Did encourage to monitor I & O, and possibly weigh self daily. Copy of discharge instructions sent, along with prescriptions for eye drops and lasix

## 2017-04-25 NOTE — ED NOTES
"Pt presents to ER with c/o left knee/swelling, \" had closed manipulation of her left knee last week, for \"scar tissue build up.\" Left knee more swollen then right. Skyla in therapy, \"was refused by pt today because of swelling.\"  Also c/o left eye redness and swelling with drainage, started last night. See assessments. Call light within reach  "

## 2017-04-25 NOTE — ED AVS SNAPSHOT
[facility]    [address]    [place] [address]    Phone:  [phone]                                       Tere Camarena   MRN: [id number]    Department:  [facility]   Date of Visit:  4/25/2017           Patient Information     Date Of Birth          1975        Your diagnoses for this visit were:     Bilateral edema of lower extremity     Postoperative edema     Acute bacterial conjunctivitis of both eyes        You were seen by Aiden Akins MD.      Follow-up Information     Follow up with Yaneth Ochoa PA.    Specialty:  Family Practice    Why:  As needed    Contact information:    [facility] [place]  [address]  [place] [address] [phone]          Discharge Instructions         Conjunctivitis, Nonspecific    The membrane that covers the white part of your eye (the conjunctiva) is inflamed. Inflammation happens when your body responds to an injury, allergic reaction, infection, or illness. Symptoms of inflammation in the eye may include redness, irritation, itching, swelling, or burning. These symptoms should go away within the next 24 hours. Conjunctivitis may be related to a particle that was in your eye. If so, it may wash out with your tears or irrigation treatment. Being exposed to liquid chemicals or fumes may also cause this reaction.   Home care    Apply a cold pack (ice in a plastic bag, wrapped in a towel) over the eye for 20 minutes at a time. This will reduce pain.    Artificial tears may be prescribed to reduce irritation or redness.  These should be used 3 to 4 times a day.    You may use acetaminophen or ibuprofen to control pain, unless another medicine was prescribed.(Note: If you have chronic liver or kidney disease, or if you have ever had a stomach ulcer or gastrointestinal bleeding, talk with your healthcare provider before using these medicines.)  Follow-up care  Follow up with your healthcare provider, or as  advised.  When to seek medical advice  Call your healthcare provider right away if any of these occur:    Increased eyelid swelling    Increased eye pain    Increased redness or drainage from the eye    Increased blurry vision or increased sensitivity to light    Failure of normal vision to return within 24 to 48 hours    0710-3164 [employer]. [address] [place] [address]. All rights reserved. This information is not intended as a substitute for professional medical care. Always follow your healthcare professional's instructions.        Coping with Edema  What is edema?  Edema is the build-up of fluid in the body, which causes swelling. Swelling most commonly occurs in the feet, ankles, lower legs or hands.  Swelling can occur in the belly or chest may be a sign of a more severe problem.  Certain medicines or conditions can make the swelling worse.  Symptoms include:    Feet and lower legs get larger when you sit or walk.    Hands feel tight when you make a fist.    When you push on the skin, skin stays dented.    Shiny, tight skin.    Fast weight gain.  How is it treated?  Your care team may give you a medicine to reduce the swelling.  They may also suggest that you meet with a dietitian. He or she can help with food choices to reduce the swelling.  What can I do about the swelling?    Place your feet above your heart 3 times a day: Sit with your feet up on a stool with a pillow. Sit on the bed or couch with two pillows under your feet.    Do not stand for long periods of time.    Wear loose-fitting clothes.    Do not cross your legs.    Reduce the salt in your diet.   These foods are high in salt:    Chips, soup    Frozen meals, TV dinners    Rodriguez, lunch meat, ham    Sauces (soy, canned spaghetti sauce)    Walk or do other exercise.    Wear compression stockings.    Drink water as normal.    Weigh yourself every day at the same time to keep track of weight gain.  When should I call my  care team?  Call your care team if:    You have a hard time breathing.    You gained 5 pounds or more in 1 week.    Your hands or feet feel cold when you touch them.    You are peeing very little or not at all.    Swelling is moving up your arms or legs.    Your tongue is swelling.    You cannot eat for more than a day  If you have any side effects, call us. We can help you manage these problems.  For more information, see:  www.chemocare.com  www.cancer.org/treatment/treatmentsandsideeffects/physicalsideeffects/dealingwithsymptomsathome  www.cancer.gov/cancertopics/coping/chemotherapy-and-you  Comments:  __________________________________________  __________________________________________  __________________________________________  __________________________________________  __________________________________________  __________________________________________  __________________________________________  For informational purposes only. Not to replace the advice of your health care provider.  Copyright   2014 [employer]. All rights reserved. [employer] 927624 - REV 03/16.  Tere,   You did not appear to have any serious problems related to your swelling.  The left postop leg shows now blood clots and the generalized edema will hopefully mobilize with a single lasix tab each day for the next week.  The sulfa antibiotic solution should help with the secondarily infected allergic conjunctivitis you have.  Good luck with your recovery so you can use that million dollar knee this summer.         Review of your medicines      START taking        Dose / Directions Last dose taken    furosemide 40 MG tablet   Commonly known as:  LASIX   Dose:  40 mg   Quantity:  10 tablet        Take 1 tablet (40 mg) by mouth daily   Refills:  1        sulfacetamide 10 % ophthalmic solution   Commonly known as:  BLEPH-10   Dose:  1 drop   Quantity:  1 Bottle        Apply 1 drop to eye every 2 hours (while awake) for 5 days    Refills:  0          Our records show that you are taking the medicines listed below. If these are incorrect, please call your family doctor or clinic.        Dose / Directions Last dose taken    HYDROcodone-acetaminophen 5-325 MG per tablet   Commonly known as:  NORCO   Dose:  1 tablet   Quantity:  30 tablet        Take 1 tablet by mouth every 4 hours as needed for moderate to severe pain 1-2 tablets as need for pain every 4 hours.   Refills:  0        hydrocortisone 2.5 % cream   Commonly known as:  ANUSOL-HC   Quantity:  30 g        Place rectally 2 times daily 1 tube   Refills:  2        ibuprofen 800 MG tablet   Commonly known as:  ADVIL/MOTRIN   Dose:  800 mg   Quantity:  60 tablet        Take 1 tablet (800 mg) by mouth every 8 hours as needed for moderate pain   Refills:  1        ketotifen 0.025 % Soln ophthalmic solution   Commonly known as:  ZADITOR/REFRESH ANTI-ITCH   Dose:  1 drop   Quantity:  1 Bottle        Place 1 drop into both eyes 2 times daily   Refills:  0        UNKNOWN TO PATIENT        Refills:  0                Prescriptions were sent or printed at these locations (2 Prescriptions)                   Other Prescriptions                Printed at Department/Unit printer (2 of 2)         furosemide (LASIX) 40 MG tablet               sulfacetamide (BLEPH-10) 10 % ophthalmic solution                Procedures and tests performed during your visit     CBC with platelets differential    Comprehensive metabolic panel    Nt probnp inpatient (BNP)    Peripheral IV catheter    Strict intake and output    TSH    Thyroxin T4 free    US Lower Extremity Venous Duplex Left      Orders Needing Specimen Collection     Ordered          04/25/17 1427  UA with Microscopic - STAT, Prio: STAT, Needs to be Collected     Scheduled Task Status   04/25/17 1422 Collect UA with Microscopic Open   Order Class:  PCU Collect                  Pending Results     Date and Time Order Name Status Description    4/25/2017 1426  US Lower Extremity Venous Duplex Left Preliminary             Pending Culture Results     No orders found from 4/23/2017 to 4/26/2017.            Thank you for choosing [facility]       Thank you for choosing [facility] for your care. Our goal is always to provide you with excellent care. Hearing back from our patients is one way we can continue to improve our services. Please take a few minutes to complete the written survey that you may receive in the mail after you visit with us. Thank you!        [employer] Information     [employer] gives you secure access to your electronic health record. If you see a primary care provider, you can also send messages to your care team and make appointments. If you have questions, please call your primary care clinic.  If you do not have a primary care provider, please call [phone] and they will assist you.        Care EveryWhere ID     This is your Care EveryWhere ID. This could be used by other organizations to access your [facility] medical records  [id number]        After Visit Summary       This is your record. Keep this with you and show to your community pharmacist(s) and doctor(s) at your next visit.

## 2017-04-25 NOTE — ED PROVIDER NOTES
History     Chief Complaint   Patient presents with     Post-op Problem     c/o post op leg swelling, notes lt knee surg and states legs started swelling yesterday     Eye Problem     lt eye swelling and drainage     HPI  Tere Camarena is a 42 year old female with hx above.  She had her left TKA in the fall but developed adhesion so just had manipulation under anesthesia to break up adhesions and has now developed swelling in her left leg and was advised at PT to come here to rule out DVT.  She has had some generalized edema as well and some secondary drainage from what started out as an allergic conjunctivitis.  No other systemic symptoms.     I have reviewed the Medications, Allergies, Past Medical and Surgical History, and Social History in the Epic system.    Review of Systems   Constitutional: Positive for activity change.   HENT: Positive for congestion.    Eyes: Positive for discharge, redness and itching.   Respiratory: Negative.    Cardiovascular: Positive for leg swelling.   Gastrointestinal: Negative.    Endocrine: Negative.    Genitourinary: Negative.    Musculoskeletal: Positive for joint swelling and myalgias.     Physical Exam   BP: 139/89  Heart Rate: 64  Temp: 97  F (36.1  C)  Resp: 16  SpO2: 99 %  Physical Exam   Constitutional: She is oriented to person, place, and time. She appears well-developed and well-nourished. No distress.   HENT:   Head: Normocephalic and atraumatic.   Eyes: Conjunctivae and EOM are normal. Pupils are equal, round, and reactive to light.   Neck: Normal range of motion. Neck supple.   Cardiovascular: Normal rate and regular rhythm.    Pulmonary/Chest: Effort normal and breath sounds normal.   Abdominal: Soft. Bowel sounds are normal. She exhibits no distension. There is no tenderness.   Musculoskeletal: Normal range of motion. She exhibits edema.   Left knee has fresh axial scar and decreased uncomfortable ROM, both lower extremities have 1-2+ edema left > right. NV  intact.    Neurological: She is alert and oriented to person, place, and time.   Skin: Skin is warm. She is not diaphoretic.     ED Course     ED Course     Procedures  Critical Care time:  none    Labs Ordered and Resulted from Time of ED Arrival Up to the Time of Departure from the ED   COMPREHENSIVE METABOLIC PANEL - Abnormal; Notable for the following:        Result Value    Urea Nitrogen 6 (*)     Calcium 8.2 (*)     Albumin 3.2 (*)     Protein Total 6.6 (*)     ALT 52 (*)     All other components within normal limits   CBC WITH PLATELETS DIFFERENTIAL   TSH   NT PROBNP INPATIENT   T4 FREE   ROUTINE UA WITH MICROSCOPIC   PERIPHERAL IV CATHETER   STRICT INTAKE AND OUTPUT     Assessments & Plan (with Medical Decision Making)   Tere Camarena has significant lower extremity edema for unclear reasons.  US of left leg showed no DVT.  Labs were unremarkable for edema etiology other than albumin of 3.2 so she is empirically Rxd lasix below for 1 week to mobilize extra fluid probably picked up at surgery.  Her left eye appears to be a secondary conjunctivitis superimposed on her atopic/vernal conjunctivitis already on patanol so given sulfa.  I have reviewed the nursing notes.    I have reviewed the findings, diagnosis, plan and need for follow up with the patient.    New Prescriptions    FUROSEMIDE (LASIX) 40 MG TABLET    Take 1 tablet (40 mg) by mouth daily    SULFACETAMIDE (BLEPH-10) 10 % OPHTHALMIC SOLUTION    Apply 1 drop to eye every 2 hours (while awake) for 5 days       Final diagnoses:   Bilateral edema of lower extremity   Postoperative edema   Acute bacterial conjunctivitis of both eyes       4/25/2017   [facility]     Aiden Akins MD  04/25/17 1460

## 2017-04-27 ENCOUNTER — OFFICE VISIT (OUTPATIENT)
Dept: FAMILY MEDICINE | Facility: OTHER | Age: 42
End: 2017-04-27
Attending: PHYSICIAN ASSISTANT
Payer: COMMERCIAL

## 2017-04-27 VITALS
BODY MASS INDEX: 30.16 KG/M2 | TEMPERATURE: 98.2 F | SYSTOLIC BLOOD PRESSURE: 116 MMHG | RESPIRATION RATE: 16 BRPM | DIASTOLIC BLOOD PRESSURE: 70 MMHG | OXYGEN SATURATION: 98 % | WEIGHT: 199 LBS | HEART RATE: 71 BPM | HEIGHT: 68 IN

## 2017-04-27 DIAGNOSIS — R60.0 LOCALIZED EDEMA: Primary | ICD-10-CM

## 2017-04-27 LAB
ALBUMIN UR-MCNC: NEGATIVE MG/DL
ANION GAP SERPL CALCULATED.3IONS-SCNC: 9 MMOL/L (ref 3–14)
APPEARANCE UR: CLEAR
BILIRUB UR QL STRIP: NEGATIVE
BUN SERPL-MCNC: 6 MG/DL (ref 7–30)
CALCIUM SERPL-MCNC: 8.6 MG/DL (ref 8.5–10.1)
CHLORIDE SERPL-SCNC: 104 MMOL/L (ref 94–109)
CO2 SERPL-SCNC: 27 MMOL/L (ref 20–32)
COLOR UR AUTO: YELLOW
CREAT SERPL-MCNC: 0.85 MG/DL (ref 0.52–1.04)
GFR SERPL CREATININE-BSD FRML MDRD: 73 ML/MIN/1.7M2
GLUCOSE SERPL-MCNC: 93 MG/DL (ref 70–99)
GLUCOSE UR STRIP-MCNC: NEGATIVE MG/DL
HGB UR QL STRIP: NEGATIVE
KETONES UR STRIP-MCNC: NEGATIVE MG/DL
LEUKOCYTE ESTERASE UR QL STRIP: NEGATIVE
NITRATE UR QL: NEGATIVE
PH UR STRIP: 7 PH (ref 5–7)
POTASSIUM SERPL-SCNC: 3.7 MMOL/L (ref 3.4–5.3)
SODIUM SERPL-SCNC: 140 MMOL/L (ref 133–144)
SP GR UR STRIP: 1.01 (ref 1–1.03)
URN SPEC COLLECT METH UR: NORMAL
UROBILINOGEN UR STRIP-ACNC: 0.2 EU/DL (ref 0.2–1)

## 2017-04-27 PROCEDURE — 71020 ZZHC CHEST TWO VIEWS, FRONT/LAT: CPT | Mod: TC

## 2017-04-27 PROCEDURE — 36415 COLL VENOUS BLD VENIPUNCTURE: CPT | Performed by: PHYSICIAN ASSISTANT

## 2017-04-27 PROCEDURE — 81003 URINALYSIS AUTO W/O SCOPE: CPT | Performed by: PHYSICIAN ASSISTANT

## 2017-04-27 PROCEDURE — 80048 BASIC METABOLIC PNL TOTAL CA: CPT | Performed by: PHYSICIAN ASSISTANT

## 2017-04-27 PROCEDURE — 99212 OFFICE O/P EST SF 10 MIN: CPT

## 2017-04-27 PROCEDURE — 99214 OFFICE O/P EST MOD 30 MIN: CPT | Performed by: PHYSICIAN ASSISTANT

## 2017-04-27 RX ORDER — HYDROCODONE BITARTRATE AND ACETAMINOPHEN 10; 325 MG/1; MG/1
1-2 TABLET ORAL
COMMUNITY
End: 2017-11-28

## 2017-04-27 RX ORDER — PROMETHAZINE HYDROCHLORIDE 25 MG/1
25 TABLET ORAL EVERY 8 HOURS PRN
COMMUNITY
End: 2017-11-28

## 2017-04-27 RX ORDER — POTASSIUM CHLORIDE 1500 MG/1
20 TABLET, EXTENDED RELEASE ORAL DAILY
Qty: 7 TABLET | Refills: 0 | Status: SHIPPED | OUTPATIENT
Start: 2017-04-27 | End: 2017-11-28

## 2017-04-27 ASSESSMENT — ANXIETY QUESTIONNAIRES
1. FEELING NERVOUS, ANXIOUS, OR ON EDGE: NOT AT ALL
2. NOT BEING ABLE TO STOP OR CONTROL WORRYING: NOT AT ALL
6. BECOMING EASILY ANNOYED OR IRRITABLE: NOT AT ALL
7. FEELING AFRAID AS IF SOMETHING AWFUL MIGHT HAPPEN: NOT AT ALL
3. WORRYING TOO MUCH ABOUT DIFFERENT THINGS: NOT AT ALL
5. BEING SO RESTLESS THAT IT IS HARD TO SIT STILL: NOT AT ALL
GAD7 TOTAL SCORE: 0

## 2017-04-27 ASSESSMENT — PAIN SCALES - GENERAL: PAINLEVEL: SEVERE PAIN (7)

## 2017-04-27 ASSESSMENT — PATIENT HEALTH QUESTIONNAIRE - PHQ9: 5. POOR APPETITE OR OVEREATING: NOT AT ALL

## 2017-04-27 NOTE — PROGRESS NOTES
Subjective:  Tere Camarena is a 42 year old female  who presents for f/u of pretibial edema L>R, which started on . She has history of extensive left knee surgery to repair quadricep tendon on Oct. 31. On  she was brought back to OR for manipulation of the left knee due to decreased ROM. Other recent surgery was excision of benign left breast lesion on . She went to ER on . Normal labs and negative US for DVT. She was placed on Lasix 40 mg for 10 days. Swelling seems mildly improved after 2 doses. She denies chest pain, palpitations,SOB or abdominal pain. She has strong FH of early heart disease. She has not consumed alcohol for 6 months. Her   5 years ago and she drank excessively ( got drunk, per patient) 3 times weekly for 4 years after his death.    Active diagnoses this visit:  Localized edema    History reviewed. No pertinent past medical history.    Past Surgical History:   Procedure Laterality Date     ABDOMEN SURGERY      hernia surgery X5     BIOPSY BREAST NEEDLE LOCALIZATION Left 2017    Procedure: BIOPSY BREAST NEEDLE LOCALIZATION;  Surgeon: Sita Rushing MD;  Location: [facility]     BREAST SURGERY Left 2016    biopsy     BREAST SURGERY Left 2016    biopsy     GYN SURGERY  2004    tubal ligation     ORTHOPEDIC SURGERY Left [id number]    left knee medial retinacular repair, VMO advancement, and reapir of partial tear of quadriceps tendon     ORTHOPEDIC SURGERY Left 2017    left knee manipulation       Family History   Problem Relation Age of Onset     Other - See Comments Mother      back surgery     Other Cancer Father      skin cancer     Hypertension Father      GASTROINTESTINAL DISEASE Father      Diverticulosis       Current Outpatient Prescriptions   Medication     HYDROcodone-acetaminophen (NORCO)  MG per tablet     Potassium Chloride ER 20 MEQ TBCR     furosemide (LASIX) 40 MG tablet     sulfacetamide (BLEPH-10) 10 % ophthalmic  solution     ketotifen (ZADITOR/REFRESH ANTI-ITCH) 0.025 % SOLN ophthalmic solution     ibuprofen (ADVIL,MOTRIN) 800 MG tablet     promethazine (PHENERGAN) 25 MG tablet     No current facility-administered medications for this visit.        Allergies   Allergen Reactions     Peanuts [Peanut Oil]      Eyes swell     Trees Other (See Comments)     Tree sap makes her eyes swell shut       Review of Systems:  Gen: negative for fever  ENT: negative for ear, mouth and throat problems  Resp: negative for significant cough, SOB or wheeze  CV: negative for chest pain, palpitations   GI: negative for abdominal pain, nausea, vomiting, diarrhea  Psych: negative for changes in mood or affect    Objective:    B/P: 116/70, T: 98.2, P: 71, R: 16    Physical Exam: Weight 200#  Constitutional: healthy, alert and no acute distress  CV: RRR. No murmur.1+ pitting edema right leg to ankle, left leg to knee.  Pulm: Lungs clear to auscultation without wheeze, rales or rhonchi  GI: Abdomen soft, non-tender. BS normal. No masses, organomegaly  Psych: mentation and affect appear normal      Assessment/Plan    (R60.0) Localized edema  (primary encounter diagnosis)  Comment: Continue Lasix 40 mg daily for 10 days total. Add K+ 20 meq daily while taking. CXR appears normal. No protein in urine. LFT's normal. Schedule echocardiogram. 1 week f/u  Plan: *UA reflex to Microscopic and Culture, Basic         metabolic panel, Echocardiogram, XR CHEST 2 VW         (Clinic Performed), Potassium Chloride ER 20         MEQ TBCR                  Rx per EPIC.  Risk/benefit/side effects and intended purposes discussed.    Follow up if symptoms persist or worsen.      Yaneth Ochoa, PAC

## 2017-04-27 NOTE — MR AVS SNAPSHOT
After Visit Summary   4/27/2017    Tere Camarena    MRN: [id number]           Patient Information     Date Of Birth          1975        Visit Information        Provider Department      4/27/2017 9:15 AM Yaneth Ochoa PA [facility]        Today's Diagnoses     Localized edema    -  1       Follow-ups after your visit        Your next 10 appointments already scheduled     May 01, 2017  9:00 AM CDT   Radiology with [facility] ([facility] )    [address]  [facility] [address]   [phone]            May 04, 2017 10:00 AM CDT   (Arrive by 9:45 AM)   SHORT with SURESH Gil   [facility] ([facility])    [address]  [facility] [address]   [phone]              Who to contact     If you have questions or need follow up information about today's clinic visit or your schedule please contact [facility] directly at [phone].  Normal or non-critical lab and imaging results will be communicated to you by [email or web address], letter or phone within 4 business days after the clinic has received the results. If you do not hear from us within 7 days, please contact the clinic through [employer] or phone. If you have a critical or abnormal lab result, we will notify you by phone as soon as possible.  Submit refill requests through [employer] or call your pharmacy and they will forward the refill request to us. Please allow 3 business days for your refill to be completed.          Additional Information About Your Visit        [employer] Information     [employer] gives you secure access to your electronic health record. If you see a primary care provider, you can also send messages to your care team and make appointments. If you have questions, please call your primary care clinic.  If you do not have a primary care provider, please call [phone] and they will assist you.        Care EveryWhere ID     This is your Care  "EveryWhere ID. This could be used by other organizations to access your [facility] medical records  [id number]        Your Vitals Were     Pulse Temperature Respirations Height Pulse Oximetry BMI (Body Mass Index)    71 98.2  F (36.8  C) (Tympanic) 16 5' 8\" (1.727 m) 98% 30.26 kg/m2       Blood Pressure from Last 3 Encounters:   04/27/17 116/70   04/25/17 128/77   02/22/17 116/70    Weight from Last 3 Encounters:   04/27/17 199 lb (90.3 kg)   02/22/17 185 lb (83.9 kg)   01/30/17 190 lb (86.2 kg)              We Performed the Following     *UA reflex to Microscopic and Culture     Basic metabolic panel     Echocardiogram     XR CHEST 2 VW (Clinic Performed)          Today's Medication Changes          These changes are accurate as of: 4/27/17 11:28 AM.  If you have any questions, ask your nurse or doctor.               Start taking these medicines.        Dose/Directions    Potassium Chloride ER 20 MEQ Tbcr   Used for:  Localized edema   Started by:  Yaneth Ochoa PA        Dose:  20 mEq   Take 1 tablet (20 mEq) by mouth daily   Quantity:  7 tablet   Refills:  0            Where to get your medicines      These medications were sent to [id number] [address] - [id number]   [id number] , [place] [address]     Phone:  [phone]     Potassium Chloride ER 20 MEQ Tbcr                Primary Care Provider Office Phone # Fax #    SURESH Gil [phone]       [facility] [address] [address] [facility] [place] [address]        Thank you!     Thank you for choosing [facility]  for your care. Our goal is always to provide you with excellent care. Hearing back from our patients is one way we can continue to improve our services. Please take a few minutes to complete the written survey that you may receive in the mail after your visit with us. Thank you!             Your Updated Medication List - Protect others around you: Learn how to safely use, store and throw " away your medicines at www.disposemymeds.org.          This list is accurate as of: 4/27/17 11:28 AM.  Always use your most recent med list.                   Brand Name Dispense Instructions for use    furosemide 40 MG tablet    LASIX    10 tablet    Take 1 tablet (40 mg) by mouth daily       HYDROcodone-acetaminophen  MG per tablet    NORCO     Take 1-2 tablets by mouth Every 4-6 hours as needed       ibuprofen 800 MG tablet    ADVIL/MOTRIN    60 tablet    Take 1 tablet (800 mg) by mouth every 8 hours as needed for moderate pain       ketotifen 0.025 % Soln ophthalmic solution    ZADITOR/REFRESH ANTI-ITCH    1 Bottle    Place 1 drop into both eyes 2 times daily       Potassium Chloride ER 20 MEQ Tbcr     7 tablet    Take 1 tablet (20 mEq) by mouth daily       promethazine 25 MG tablet    PHENERGAN     Take 25 mg by mouth every 8 hours as needed for nausea Reported on 4/27/2017       sulfacetamide 10 % ophthalmic solution    BLEPH-10    1 Bottle    Apply 1 drop to eye every 2 hours (while awake) for 5 days

## 2017-04-27 NOTE — NURSING NOTE
"Chief Complaint   Patient presents with     ER F/U     follow up ER from 4/25/17 for bilateral lower extremity edema       Initial /70 (BP Location: Right arm, Patient Position: Chair, Cuff Size: Adult Regular)  Pulse 71  Temp 98.2  F (36.8  C) (Tympanic)  Resp 16  Ht 5' 8\" (1.727 m)  Wt 152 lb (68.9 kg)  SpO2 98%  BMI 23.11 kg/m2 Estimated body mass index is 23.11 kg/(m^2) as calculated from the following:    Height as of this encounter: 5' 8\" (1.727 m).    Weight as of this encounter: 152 lb (68.9 kg).  Medication Reconciliation: complete   Rosalie Romero LPN      "

## 2017-04-28 ASSESSMENT — PATIENT HEALTH QUESTIONNAIRE - PHQ9: SUM OF ALL RESPONSES TO PHQ QUESTIONS 1-9: 3

## 2017-04-28 ASSESSMENT — ANXIETY QUESTIONNAIRES: GAD7 TOTAL SCORE: 0

## 2017-05-01 ENCOUNTER — HOSPITAL ENCOUNTER (OUTPATIENT)
Dept: ULTRASOUND IMAGING | Facility: HOSPITAL | Age: 42
Discharge: HOME | End: 2017-05-01
Attending: PHYSICIAN ASSISTANT | Admitting: PHYSICIAN ASSISTANT
Payer: COMMERCIAL

## 2017-05-01 PROCEDURE — 93306 TTE W/DOPPLER COMPLETE: CPT | Mod: 26 | Performed by: INTERNAL MEDICINE

## 2017-05-01 PROCEDURE — 93306 TTE W/DOPPLER COMPLETE: CPT | Mod: TC

## 2017-05-04 ENCOUNTER — OFFICE VISIT (OUTPATIENT)
Dept: FAMILY MEDICINE | Facility: OTHER | Age: 42
End: 2017-05-04
Attending: PHYSICIAN ASSISTANT
Payer: COMMERCIAL

## 2017-05-04 VITALS
HEIGHT: 68 IN | DIASTOLIC BLOOD PRESSURE: 66 MMHG | HEART RATE: 76 BPM | WEIGHT: 197.2 LBS | BODY MASS INDEX: 29.89 KG/M2 | OXYGEN SATURATION: 98 % | RESPIRATION RATE: 16 BRPM | SYSTOLIC BLOOD PRESSURE: 116 MMHG | TEMPERATURE: 98.2 F

## 2017-05-04 DIAGNOSIS — R60.0 LOCALIZED EDEMA: Primary | ICD-10-CM

## 2017-05-04 LAB
ANION GAP SERPL CALCULATED.3IONS-SCNC: 9 MMOL/L (ref 3–14)
BUN SERPL-MCNC: 13 MG/DL (ref 7–30)
CALCIUM SERPL-MCNC: 8.8 MG/DL (ref 8.5–10.1)
CHLORIDE SERPL-SCNC: 106 MMOL/L (ref 94–109)
CO2 SERPL-SCNC: 24 MMOL/L (ref 20–32)
CREAT SERPL-MCNC: 0.72 MG/DL (ref 0.52–1.04)
GFR SERPL CREATININE-BSD FRML MDRD: 89 ML/MIN/1.7M2
GLUCOSE SERPL-MCNC: 88 MG/DL (ref 70–99)
POTASSIUM SERPL-SCNC: 4.2 MMOL/L (ref 3.4–5.3)
SODIUM SERPL-SCNC: 139 MMOL/L (ref 133–144)

## 2017-05-04 PROCEDURE — 80048 BASIC METABOLIC PNL TOTAL CA: CPT | Mod: ZL | Performed by: PHYSICIAN ASSISTANT

## 2017-05-04 PROCEDURE — 36415 COLL VENOUS BLD VENIPUNCTURE: CPT | Mod: ZL | Performed by: PHYSICIAN ASSISTANT

## 2017-05-04 PROCEDURE — 99212 OFFICE O/P EST SF 10 MIN: CPT

## 2017-05-04 PROCEDURE — 99213 OFFICE O/P EST LOW 20 MIN: CPT | Performed by: PHYSICIAN ASSISTANT

## 2017-05-04 ASSESSMENT — PAIN SCALES - GENERAL: PAINLEVEL: SEVERE PAIN (6)

## 2017-05-04 NOTE — NURSING NOTE
"Chief Complaint   Patient presents with     RECHECK     bilateral edema        Initial /66 (BP Location: Right arm, Patient Position: Chair, Cuff Size: Adult Regular)  Pulse 76  Temp 98.2  F (36.8  C) (Tympanic)  Resp 16  Ht 5' 8\" (1.727 m)  Wt 197 lb 3.2 oz (89.4 kg)  SpO2 98%  BMI 29.98 kg/m2 Estimated body mass index is 29.98 kg/(m^2) as calculated from the following:    Height as of this encounter: 5' 8\" (1.727 m).    Weight as of this encounter: 197 lb 3.2 oz (89.4 kg).  Medication Reconciliation: complete   Shirley Khan    "

## 2017-05-04 NOTE — MR AVS SNAPSHOT
"              After Visit Summary   5/4/2017    Tere Camarena    MRN: [id number]           Patient Information     Date Of Birth          1975        Visit Information        Provider Department      5/4/2017 10:00 AM Yaneth Ochoa PA [facility]        Today's Diagnoses     Localized edema    -  1       Follow-ups after your visit        Who to contact     If you have questions or need follow up information about today's clinic visit or your schedule please contact [facility] directly at [phone].  Normal or non-critical lab and imaging results will be communicated to you by [employer], letter or phone within 4 business days after the clinic has received the results. If you do not hear from us within 7 days, please contact the clinic through [employer] or phone. If you have a critical or abnormal lab result, we will notify you by phone as soon as possible.  Submit refill requests through [employer] or call your pharmacy and they will forward the refill request to us. Please allow 3 business days for your refill to be completed.          Additional Information About Your Visit        MyChart Information     [employer] gives you secure access to your electronic health record. If you see a primary care provider, you can also send messages to your care team and make appointments. If you have questions, please call your primary care clinic.  If you do not have a primary care provider, please call [phone] and they will assist you.        Care EveryWhere ID     This is your Care EveryWhere ID. This could be used by other organizations to access your [facility] medical records  [id number]        Your Vitals Were     Pulse Temperature Respirations Height Pulse Oximetry BMI (Body Mass Index)    76 98.2  F (36.8  C) (Tympanic) 16 5' 8\" (1.727 m) 98% 29.98 kg/m2       Blood Pressure from Last 3 Encounters:   05/04/17 116/66   04/27/17 116/70   04/25/17 128/77    Weight from Last 3 " Encounters:   05/04/17 197 lb 3.2 oz (89.4 kg)   04/27/17 199 lb (90.3 kg)   02/22/17 185 lb (83.9 kg)              We Performed the Following     Basic metabolic panel        Primary Care Provider Office Phone # Fax #    SURESH Gil [phone]       [facility] [place] [address] [facility]  [place] [address]        Thank you!     Thank you for choosing [facility]  for your care. Our goal is always to provide you with excellent care. Hearing back from our patients is one way we can continue to improve our services. Please take a few minutes to complete the written survey that you may receive in the mail after your visit with us. Thank you!             Your Updated Medication List - Protect others around you: Learn how to safely use, store and throw away your medicines at www.disposemymeds.org.          This list is accurate as of: 5/4/17 12:53 PM.  Always use your most recent med list.                   Brand Name Dispense Instructions for use    furosemide 40 MG tablet    LASIX    10 tablet    Take 1 tablet (40 mg) by mouth daily       HYDROcodone-acetaminophen  MG per tablet    NORCO     Take 1-2 tablets by mouth Every 4-6 hours as needed       ibuprofen 800 MG tablet    ADVIL/MOTRIN    60 tablet    Take 1 tablet (800 mg) by mouth every 8 hours as needed for moderate pain       ketotifen 0.025 % Soln ophthalmic solution    ZADITOR/REFRESH ANTI-ITCH    1 Bottle    Place 1 drop into both eyes 2 times daily       Potassium Chloride ER 20 MEQ Tbcr     7 tablet    Take 1 tablet (20 mEq) by mouth daily       promethazine 25 MG tablet    PHENERGAN     Take 25 mg by mouth every 8 hours as needed for nausea Reported on 4/27/2017

## 2017-05-04 NOTE — PROGRESS NOTES
Subjective:  Tere Camarena is a 42 year old female who presents for follow up of lower extremity edema. This has improved. She has 2 days remaining to take Lasix. Denies chest pain, palpitations, SOB.  Lab Results   Component Value Date    CHOL 166 09/07/2016     Lab Results   Component Value Date    HDL 49 09/07/2016     Lab Results   Component Value Date    LDL 91 09/07/2016     Lab Results   Component Value Date    TRIG 128 09/07/2016     No results found for: CHOLHDLRATIO  Last Basic Metabolic Panel:  Lab Results   Component Value Date     04/27/2017      Lab Results   Component Value Date    POTASSIUM 3.7 04/27/2017     Lab Results   Component Value Date    CHLORIDE 104 04/27/2017     Lab Results   Component Value Date    JEWELS 8.6 04/27/2017     Lab Results   Component Value Date    CO2 27 04/27/2017     Lab Results   Component Value Date    BUN 6 04/27/2017     Lab Results   Component Value Date    CR 0.85 04/27/2017     Lab Results   Component Value Date    GLC 93 04/27/2017       Active diagnoses this visit:  Localized edema    PMH, PSH, FH reviewed and unchanged    Current Outpatient Prescriptions   Medication     HYDROcodone-acetaminophen (NORCO)  MG per tablet     promethazine (PHENERGAN) 25 MG tablet     Potassium Chloride ER 20 MEQ TBCR     furosemide (LASIX) 40 MG tablet     ketotifen (ZADITOR/REFRESH ANTI-ITCH) 0.025 % SOLN ophthalmic solution     ibuprofen (ADVIL,MOTRIN) 800 MG tablet     No current facility-administered medications for this visit.        Allergies   Allergen Reactions     Peanuts [Peanut Oil]      Eyes swell     Trees Other (See Comments)     Tree sap makes her eyes swell shut       Review of Systems:  Gen: recent illness, change in weight  Derm: denies rash, moles or lesions  Resp: denies significant cough, SOB or wheeze  CV: denies chest pain, palpitations   Psych: denies depressed mood or anxiety    Objective:    B/P: 116/66, T: 98.2, P: 76, R: 16  Body mass index is  29.98 kg/(m^2).    Physical Exam:  Constitutional: healthy, alert and no distress  CV: RRR. No murmur. Mild-moderate ankle to foot edema on the left. No right edema  Pulm: Lungs clear to auscultation without wheeze, rales or rhonchi.   Skin: no suspicious lesions or rashes  Psych: Alert, orientated      Assessment/Plan  (R60.0) Localized edema  (primary encounter diagnosis)  Comment: Improved. I think left edema is post knee surgery. Finish out Lasix and simply monitor. Lab today.  Plan: Basic metabolic panel                      Follow up  prn.    Yaneth Ochoa

## 2017-07-27 ENCOUNTER — TELEPHONE (OUTPATIENT)
Dept: FAMILY MEDICINE | Facility: OTHER | Age: 42
End: 2017-07-27

## 2017-07-27 NOTE — TELEPHONE ENCOUNTER
Patient called and said she had a Pre-Employment Drug test and it came back positive for marijuana and she said there is no way it should've been positive.  The lady that gave her the results told her to contact her doctor to see if any of the medications she's taking could've made the test positive for that.  Here is a list of the meds she currently takes:    Ibuprofen 800mg - Twice daily  Meloxicam 7.5mg - Daily  Acid Reducer (OTC) - At least once a day  Patanol eye drops - 1 drop in each eye twice daily  Sulfacetamide 10% eye drops - as needed (has been using them 1-2 times per week)

## 2017-11-11 ENCOUNTER — HEALTH MAINTENANCE LETTER (OUTPATIENT)
Age: 42
End: 2017-11-11

## 2017-11-28 ENCOUNTER — OFFICE VISIT (OUTPATIENT)
Dept: FAMILY MEDICINE | Facility: OTHER | Age: 42
End: 2017-11-28
Attending: FAMILY MEDICINE
Payer: COMMERCIAL

## 2017-11-28 VITALS
HEART RATE: 77 BPM | WEIGHT: 202 LBS | BODY MASS INDEX: 30.62 KG/M2 | HEIGHT: 68 IN | SYSTOLIC BLOOD PRESSURE: 118 MMHG | RESPIRATION RATE: 16 BRPM | TEMPERATURE: 99.2 F | OXYGEN SATURATION: 99 % | DIASTOLIC BLOOD PRESSURE: 66 MMHG

## 2017-11-28 DIAGNOSIS — R20.2 PARESTHESIA OF LEFT FOOT: Primary | ICD-10-CM

## 2017-11-28 PROCEDURE — 99212 OFFICE O/P EST SF 10 MIN: CPT

## 2017-11-28 PROCEDURE — 99213 OFFICE O/P EST LOW 20 MIN: CPT | Performed by: FAMILY MEDICINE

## 2017-11-28 RX ORDER — PREDNISONE 20 MG/1
20 TABLET ORAL 2 TIMES DAILY
Qty: 10 TABLET | Refills: 0 | Status: SHIPPED | OUTPATIENT
Start: 2017-11-28 | End: 2018-12-17

## 2017-11-28 ASSESSMENT — ANXIETY QUESTIONNAIRES
1. FEELING NERVOUS, ANXIOUS, OR ON EDGE: NOT AT ALL
2. NOT BEING ABLE TO STOP OR CONTROL WORRYING: NOT AT ALL
6. BECOMING EASILY ANNOYED OR IRRITABLE: SEVERAL DAYS
GAD7 TOTAL SCORE: 1
7. FEELING AFRAID AS IF SOMETHING AWFUL MIGHT HAPPEN: NOT AT ALL
3. WORRYING TOO MUCH ABOUT DIFFERENT THINGS: NOT AT ALL
5. BEING SO RESTLESS THAT IT IS HARD TO SIT STILL: NOT AT ALL
IF YOU CHECKED OFF ANY PROBLEMS ON THIS QUESTIONNAIRE, HOW DIFFICULT HAVE THESE PROBLEMS MADE IT FOR YOU TO DO YOUR WORK, TAKE CARE OF THINGS AT HOME, OR GET ALONG WITH OTHER PEOPLE: SOMEWHAT DIFFICULT

## 2017-11-28 ASSESSMENT — PATIENT HEALTH QUESTIONNAIRE - PHQ9
5. POOR APPETITE OR OVEREATING: NOT AT ALL
SUM OF ALL RESPONSES TO PHQ QUESTIONS 1-9: 1

## 2017-11-28 ASSESSMENT — PAIN SCALES - GENERAL: PAINLEVEL: WORST PAIN (10)

## 2017-11-28 NOTE — MR AVS SNAPSHOT
After Visit Summary   11/28/2017    Tere Camarena    MRN: [id number]           Patient Information     Date Of Birth          1975        Visit Information        Provider Department      11/28/2017 2:00 PM Carl Goodwin MD [facility]        Today's Diagnoses     Paresthesia of left foot    -  1      Care Instructions    F/u with ongoing concerns.           Follow-ups after your visit        Additional Services     NEUROLOGY ADULT REFERRAL       Your provider has referred you for the following:   EMG at [place]    Please be aware that coverage of these services is subject to the terms and limitations of your health insurance plan.  Call member services at your health plan with any benefit or coverage questions.      Please bring the following with you to your appointment:    (1) Any X-Rays, CTs or MRIs which have been performed.  Contact the facility where they were done to arrange for  prior to your scheduled appointment.    (2) List of current medications  (3) This referral request   (4) Any documents/labs given to you for this referral                  Who to contact     If you have questions or need follow up information about today's clinic visit or your schedule please contact [facility] directly at [phone].  Normal or non-critical lab and imaging results will be communicated to you by MyChart, letter or phone within 4 business days after the clinic has received the results. If you do not hear from us within 7 days, please contact the clinic through [employer] or phone. If you have a critical or abnormal lab result, we will notify you by phone as soon as possible.  Submit refill requests through [employer] or call your pharmacy and they will forward the refill request to us. Please allow 3 business days for your refill to be completed.          Additional Information About Your Visit        [employer] Information     [employer] gives you secure access to  "your electronic health record. If you see a primary care provider, you can also send messages to your care team and make appointments. If you have questions, please call your primary care clinic.  If you do not have a primary care provider, please call [phone] and they will assist you.        Care EveryWhere ID     This is your Care EveryWhere ID. This could be used by other organizations to access your [facility] medical records  [id number]        Your Vitals Were     Pulse Temperature Respirations Height Pulse Oximetry BMI (Body Mass Index)    77 99.2  F (37.3  C) (Tympanic) 16 5' 8\" (1.727 m) 99% 30.71 kg/m2       Blood Pressure from Last 3 Encounters:   11/28/17 118/66   05/04/17 116/66   04/27/17 116/70    Weight from Last 3 Encounters:   11/28/17 202 lb (91.6 kg)   05/04/17 197 lb 3.2 oz (89.4 kg)   04/27/17 199 lb (90.3 kg)              We Performed the Following     NEUROLOGY ADULT REFERRAL          Today's Medication Changes          These changes are accurate as of: 11/28/17  2:56 PM.  If you have any questions, ask your nurse or doctor.               Start taking these medicines.        Dose/Directions    predniSONE 20 MG tablet   Commonly known as:  DELTASONE   Used for:  Paresthesia of left foot   Started by:  Carl Goodwin MD        Dose:  20 mg   Take 1 tablet (20 mg) by mouth 2 times daily   Quantity:  10 tablet   Refills:  0            Where to get your medicines      These medications were sent to [id number] - [place], [place] - [address]  [address], [place] [address]     Phone:  [phone]     predniSONE 20 MG tablet                Primary Care Provider Office Phone # Fax #    SURESH Gil [phone]       [facility] [place] [address] [address]        Equal Access to Services     LUIS RAMACHANDRAN AH: Hadii aad ku hadasho Soomaali, waaxda luqadaha, qaybta kaalmada adeegyada, waxay yannick henriquez. So wa " [phone].    ATENCIÓN: Si zelalem suarez, tiene a quick disposición servicios gratuitos de asistencia lingüística. Tiago kessler [phone].    We comply with applicable federal civil rights laws and [place] laws. We do not discriminate on the basis of race, color, national origin, age, disability, sex, sexual orientation, or gender identity.            Thank you!     Thank you for choosing [facility]  for your care. Our goal is always to provide you with excellent care. Hearing back from our patients is one way we can continue to improve our services. Please take a few minutes to complete the written survey that you may receive in the mail after your visit with us. Thank you!             Your Updated Medication List - Protect others around you: Learn how to safely use, store and throw away your medicines at www.disposemymeds.org.          This list is accurate as of: 11/28/17  2:56 PM.  Always use your most recent med list.                   Brand Name Dispense Instructions for use Diagnosis    ibuprofen 800 MG tablet    ADVIL/MOTRIN    60 tablet    Take 1 tablet (800 mg) by mouth every 8 hours as needed for moderate pain    Lumbar radiculopathy       ketotifen 0.025 % Soln ophthalmic solution    ZADITOR/REFRESH ANTI-ITCH    1 Bottle    Place 1 drop into both eyes 2 times daily    Allergic state, initial encounter       predniSONE 20 MG tablet    DELTASONE    10 tablet    Take 1 tablet (20 mg) by mouth 2 times daily    Paresthesia of left foot

## 2017-11-28 NOTE — NURSING NOTE
"Chief Complaint   Patient presents with     Pain     Pt has a burning sensation on the top her left foot for one month. Pt has difficulty sleeping. Burning sensation are getting worse. Pain level varies. Pt does not have tingling or numbness. Pt had left knee surgery one year ago. Pt has not had any back problems in the last 4-5 years.       Initial /66 (BP Location: Right arm, Patient Position: Chair, Cuff Size: Adult Regular)  Pulse 77  Temp 99.2  F (37.3  C) (Tympanic)  Resp 16  Ht 5' 8\" (1.727 m)  Wt 202 lb (91.6 kg)  SpO2 99%  BMI 30.71 kg/m2 Estimated body mass index is 30.71 kg/(m^2) as calculated from the following:    Height as of this encounter: 5' 8\" (1.727 m).    Weight as of this encounter: 202 lb (91.6 kg).  Medication Reconciliation: complete   Mitra Adamson    "

## 2017-11-28 NOTE — PROGRESS NOTES
Tere Camarena    November 28, 2017    Chief Complaint   Patient presents with     Pain     Pt has a burning sensation on the top her left foot for one month. Pt has difficulty sleeping. Burning sensation are getting worse. Pain level varies. Pt does not have tingling or numbness. Pt had left knee surgery one year ago. Pt has not had any back problems in the last 4-5 years.       SUBJECTIVE:  Here for left foot burning pain laterally.  Very painful at work.  Works at [employer] on feet all day.  No injury.  No issues with swelling of late.  Nothing to explain this at all.  Very odd burning pain.      History reviewed. No pertinent past medical history.    Past Surgical History:   Procedure Laterality Date     ABDOMEN SURGERY      hernia surgery X5     BIOPSY BREAST NEEDLE LOCALIZATION Left 1/30/2017    Procedure: BIOPSY BREAST NEEDLE LOCALIZATION;  Surgeon: Sita Rushing MD;  Location: [facility] OR     BREAST SURGERY Left 09/28/2016    biopsy     BREAST SURGERY Left 09/13/2016    biopsy     GYN SURGERY  2004    tubal ligation     ORTHOPEDIC SURGERY Left [id number]    left knee medial retinacular repair, VMO advancement, and reapir of partial tear of quadriceps tendon     ORTHOPEDIC SURGERY Left 04/18/2017    left knee manipulation       Current Outpatient Prescriptions   Medication Sig Dispense Refill     predniSONE (DELTASONE) 20 MG tablet Take 1 tablet (20 mg) by mouth 2 times daily 10 tablet 0     ketotifen (ZADITOR/REFRESH ANTI-ITCH) 0.025 % SOLN ophthalmic solution Place 1 drop into both eyes 2 times daily 1 Bottle 0     ibuprofen (ADVIL,MOTRIN) 800 MG tablet Take 1 tablet (800 mg) by mouth every 8 hours as needed for moderate pain 60 tablet 1       Allergies   Allergen Reactions     Peanuts [Peanut Oil]      Eyes swell     Trees Other (See Comments)     Tree sap makes her eyes swell shut       Family History   Problem Relation Age of Onset     Other - See Comments Mother      back surgery     Other Cancer Father       skin cancer     Hypertension Father      GASTROINTESTINAL DISEASE Father      Diverticulosis       Social History     Social History     Marital status:      Spouse name: N/A     Number of children: N/A     Years of education: N/A     Occupational History     Not on file.     Social History Main Topics     Smoking status: Former Smoker     Packs/day: 0.50     Years: 11.00     Smokeless tobacco: Never Used     Alcohol use Yes      Comment: Quit     Drug use: No     Sexual activity: Yes     Partners: Male     Other Topics Concern     Parent/Sibling W/ Cabg, Mi Or Angioplasty Before 65f 55m? No     Social History Narrative       5 point ROS negative except as noted above in HPI, including Gen., Resp., CV, GI &  system review.     OBJECTIVE:  B/P: 118/66, Temperature: 99.2, Pulse: 77, Respirations: 16    GENERAL APPEARANCE: Alert, no acute distress  Tender skin, very sensitive to monofilament on the lateral aspect of the dorsum of the left foot.  Medial is fine wtihout issue.    SKIN: no suspicious lesions or rashes to visualized skin  NEURO: Alert, oriented x 3, speech and mentation normal    ASSESSMENT and PLAN:  (R20.2) Paresthesia of left foot  (primary encounter diagnosis)  Comment: likely a radiculopathy.   Plan: NEUROLOGY ADULT REFERRAL, predniSONE         (DELTASONE) 20 MG tablet        Reviewed.  Trial of steroid and EMG set up.  F/u based on results of med and EMG.  If med works she will hold off on EMG but we will see where this goes.

## 2017-11-29 ASSESSMENT — ANXIETY QUESTIONNAIRES: GAD7 TOTAL SCORE: 1

## 2017-12-27 ENCOUNTER — TRANSFERRED RECORDS (OUTPATIENT)
Dept: HEALTH INFORMATION MANAGEMENT | Facility: HOSPITAL | Age: 42
End: 2017-12-27

## 2018-01-16 ENCOUNTER — TELEPHONE (OUTPATIENT)
Dept: FAMILY MEDICINE | Facility: OTHER | Age: 43
End: 2018-01-16

## 2018-01-16 NOTE — TELEPHONE ENCOUNTER
Pt calls for her test results and would like to get recommendations regarding her pain. See her EMG results in the Media portion of her chart.

## 2018-03-27 ENCOUNTER — HOSPITAL ENCOUNTER (EMERGENCY)
Facility: HOSPITAL | Age: 43
Discharge: HOME | End: 2018-03-27
Attending: NURSE PRACTITIONER | Admitting: NURSE PRACTITIONER
Payer: COMMERCIAL

## 2018-03-27 ENCOUNTER — APPOINTMENT (OUTPATIENT)
Dept: ULTRASOUND IMAGING | Facility: HOSPITAL | Age: 43
End: 2018-03-27
Attending: NURSE PRACTITIONER
Payer: COMMERCIAL

## 2018-03-27 VITALS — RESPIRATION RATE: 16 BRPM | TEMPERATURE: 97.3 F | SYSTOLIC BLOOD PRESSURE: 144 MMHG | DIASTOLIC BLOOD PRESSURE: 90 MMHG

## 2018-03-27 DIAGNOSIS — M79.652 PAIN OF LEFT THIGH: ICD-10-CM

## 2018-03-27 PROCEDURE — 99214 OFFICE O/P EST MOD 30 MIN: CPT | Performed by: NURSE PRACTITIONER

## 2018-03-27 PROCEDURE — G0463 HOSPITAL OUTPT CLINIC VISIT: HCPCS | Mod: 25

## 2018-03-27 PROCEDURE — 93971 EXTREMITY STUDY: CPT | Mod: TC,LT

## 2018-03-27 NOTE — ED AVS SNAPSHOT
[facility]    [address] [address]    Phone:  [phone]                                       Tere Camarena   MRN: [id number]    Department:  [facility]   Date of Visit:  3/27/2018           After Visit Summary Signature Page     I have received my discharge instructions, and my questions have been answered. I have discussed any challenges I see with this plan with the nurse or doctor.    ..........................................................................................................................................  Patient/Patient Representative Signature      ..........................................................................................................................................  Patient Representative Print Name and Relationship to Patient    ..................................................               ................................................  Date                                            Time    ..........................................................................................................................................  Reviewed by Signature/Title    ...................................................              ..............................................  Date                                                            Time

## 2018-03-27 NOTE — ED NOTES
Patient presents with groin pain X 1 week.  NKI.   Applied ice last noc but not today.  Taking IBU/Tylenol today.

## 2018-03-27 NOTE — ED AVS SNAPSHOT
[facility]    [address] [address]    Phone:  [phone]                                       Tere Camarena   MRN: [id number]    Department:  [facility]   Date of Visit:  3/27/2018           Patient Information     Date Of Birth          1975        Your diagnoses for this visit were:     Pain of left thigh        You were seen by Irene Goodwin NP.      Follow-up Information     Follow up with Carl Goodwin MD In 1 week.    Specialty:  Family Practice    Why:  for re-evaluation    Contact information:    [address]  [facility] [place] [id number] [phone]          Follow up with [facility].    Specialty:  EMERGENCY MEDICINE    Why:  If symptoms worsen    Contact information:    [address] [address] [phone]    Additional information:    From [facility]: Take [address]. Turn left at [address]. Turn left at the first stoplight on [address] [place] [address]. At the first stop sign, take a right onto [address]. Take a left into the parking lot and continue through until you reach the North enterance of the building.       From [place]: Take [address]. Take the [address] ramp towards [place]. Turn left onto [address]. Take a slight right onto [address] [facility]. Turn left at the first stoplight on [address]. At the first stop sign, take a right onto [address]. Take a left into the parking lot and continue through until you reach the North enterance of the building.       From [place]: Take [address]. Take a right at [address]. At the first stop sign, take a right onto [address]. Take a left into the parking lot and continue through until you reach the North enterance of the building.         Discharge Instructions         Muscle Strain in the Extremities  A muscle strain is a stretching and tearing of muscle fibers. This causes  pain, especially when you move that muscle. There may also be some swelling and bruising.  Home care    Keep the hurt area raised to reduce pain and swelling. This is especially important during the first 48 hours.    Apply an ice pack over the injured area for 15 to 20 minutes every 3 to 6 hours. You should do this for the first 24 to 48 hours. You can make an ice pack by filling a plastic bag that seals at the top with ice cubes and then wrapping it with a thin towel. Be careful not to injure your skin with the ice treatments. Ice should never be applied directly to skin. Continue the use of ice packs for relief of pain and swelling as needed. After 48 hours, apply heat (warm shower or warm bath) for 15 to 20 minutes several times a day, or alternate ice and heat.    You may use over-the-counter pain medicine to control pain, unless another medicine was prescribed. If you have chronic liver or kidney disease or ever had a stomach ulcer or GI bleeding, talk with your healthcare provider before using these medicines.    For leg strains: If crutches have been recommended, don t put full weight on the hurt leg until you can do so without pain. You can return to sports when you are able to hop and run on the injured leg without pain.  Follow-up care  Follow up with your healthcare provider, or as advised.  When to seek medical advice  Call your healthcare provider right away if any of these occur:    The toes of the injured leg become swollen, cold, blue, numb, or tingly    Pain or swelling increases  Date Last Reviewed: 11/19/2015 2000-2017 [employer]. [address] [place] [address], [place] [address]. All rights reserved. This information is not intended as a substitute for professional medical care. Always follow your healthcare professional's instructions.             Review of your medicines      Our records show that you are taking the medicines listed below. If these are incorrect, please call your  family doctor or clinic.        Dose / Directions Last dose taken    ibuprofen 800 MG tablet   Commonly known as:  ADVIL/MOTRIN   Dose:  800 mg   Quantity:  60 tablet        Take 1 tablet (800 mg) by mouth every 8 hours as needed for moderate pain   Refills:  1        ketotifen 0.025 % Soln ophthalmic solution   Commonly known as:  ZADITOR/REFRESH ANTI-ITCH   Dose:  1 drop   Quantity:  1 Bottle        Place 1 drop into both eyes 2 times daily   Refills:  0        predniSONE 20 MG tablet   Commonly known as:  DELTASONE   Dose:  20 mg   Quantity:  10 tablet        Take 1 tablet (20 mg) by mouth 2 times daily   Refills:  0                Procedures and tests performed during your visit     US Lower Extremity Venous Duplex Left      Orders Needing Specimen Collection     None      Pending Results     Date and Time Order Name Status Description    3/27/2018 1907  Lower Extremity Venous Duplex Left In process             Pending Culture Results     No orders found from 3/25/2018 to 3/28/2018.            Thank you for choosing [facility]       Thank you for choosing [facility] for your care. Our goal is always to provide you with excellent care. Hearing back from our patients is one way we can continue to improve our services. Please take a few minutes to complete the written survey that you may receive in the mail after you visit with us. Thank you!        [facility] Information     [employer] gives you secure access to your electronic health record. If you see a primary care provider, you can also send messages to your care team and make appointments. If you have questions, please call your primary care clinic.  If you do not have a primary care provider, please call [phone] and they will assist you.        Care EveryWhere ID     This is your Care EveryWhere ID. This could be used by other organizations to access your [facility] medical records  [id number]        Equal Access to Services     LUIS RAMACHANDRAN AH: Ruel jaramillo  lorrie Cho, cyndee miranda, hood rincon, estefania henriquez. So [facility] [phone].    ATENCIÓN: Si habla español, tiene a quick disposición servicios gratuitos de asistencia lingüística. Llame al [phone].    We comply with applicable federal civil rights laws and [place] laws. We do not discriminate on the basis of race, color, national origin, age, disability, sex, sexual orientation, or gender identity.            After Visit Summary       This is your record. Keep this with you and show to your community pharmacist(s) and doctor(s) at your next visit.

## 2018-03-27 NOTE — ED PROVIDER NOTES
History     Chief Complaint   Patient presents with     Muscle Pain     lt groin pain that comes and goes with ambulation, notes started working out at the y approx 2 months ago. notes taking ibuprofen and tylenol for the discomfort     The history is provided by the patient. No  was used.     Tere Camarena is a 42 year old female who presents with left groin pain intermittently for the past 10 days.   Has been exercising more the past 2 months, 3-4 times a week riding the exercise bike and walking.   No specific injury. Will feel a little better with activity, then it gets worse. Pain isn't constant but more persistent than not.   Patient reports at times her pain causes her to break out in a sweat. Taking Tylenol and ibuprofen.     Problem List:    Patient Active Problem List    Diagnosis Date Noted     ACP (advance care planning) 06/30/2016     Priority: Medium     Advance Care Planning 6/30/2016: ACP Review of Chart / Resources Provided:  Reviewed chart for advance care plan.  Tere Camarena has no plan or code status on file. Discussed available resources and provided with information. Confirmed code status reflects current choices pending further ACP discussions.  Confirmed/documented legally designated decision makers.  Added by Mitra Adamson                Past Medical History:    No past medical history on file.    Past Surgical History:    Past Surgical History:   Procedure Laterality Date     ABDOMEN SURGERY      hernia surgery X5     BIOPSY BREAST NEEDLE LOCALIZATION Left 1/30/2017    Procedure: BIOPSY BREAST NEEDLE LOCALIZATION;  Surgeon: Sita Rushing MD;  Location: [facility]     BREAST SURGERY Left 09/28/2016    biopsy     BREAST SURGERY Left 09/13/2016    biopsy     GYN SURGERY  2004    tubal ligation     ORTHOPEDIC SURGERY Left [id number]    left knee medial retinacular repair, VMO advancement, and reapir of partial tear of quadriceps tendon     ORTHOPEDIC SURGERY Left  04/18/2017    left knee manipulation       Family History:    Family History   Problem Relation Age of Onset     Other - See Comments Mother      back surgery     Other Cancer Father      skin cancer     Hypertension Father      GASTROINTESTINAL DISEASE Father      Diverticulosis       Social History:  Marital Status:   [5]  Social History   Substance Use Topics     Smoking status: Former Smoker     Packs/day: 0.50     Years: 11.00     Smokeless tobacco: Never Used     Alcohol use Yes      Comment: Quit        Medications:      predniSONE (DELTASONE) 20 MG tablet   ketotifen (ZADITOR/REFRESH ANTI-ITCH) 0.025 % SOLN ophthalmic solution   ibuprofen (ADVIL,MOTRIN) 800 MG tablet         Review of Systems   Constitutional: Negative.    Musculoskeletal: Negative for joint swelling.        Pain in her upper left thigh   Skin: Negative for rash.       Physical Exam   BP: 144/90  Heart Rate: 73  Temp: 97.3  F (36.3  C)  Resp: 16      Physical Exam   Constitutional: She is oriented to person, place, and time. She appears well-developed and well-nourished. No distress.   HENT:   Head: Normocephalic and atraumatic.   Eyes: Conjunctivae are normal. No scleral icterus.   Neck: Normal range of motion.   Cardiovascular: Normal rate, regular rhythm and normal heart sounds.    Pulmonary/Chest: Effort normal and breath sounds normal.   Musculoskeletal:        Left hip: She exhibits normal range of motion, normal strength, no tenderness, no swelling, no crepitus, no deformity and no laceration.        Left knee: Normal.        Left ankle: Normal.        Legs:  Neurological: She is alert and oriented to person, place, and time.   Skin: Skin is warm, dry and intact. No rash noted. She is not diaphoretic.   Psychiatric: She has a normal mood and affect.   Nursing note and vitals reviewed.      ED Course     ED Course     Procedures     Results for orders placed or performed during the hospital encounter of 03/27/18 (from the past  24 hour(s))   US Lower Extremity Venous Duplex Left    Narrative    Exam:US LOWER EXTREMITY VENOUS DUPLEX LEFT    History: Left leg pain    Comparisons: April 25, 2017    Technique: Venous duplex ultrasonography of the left lower extremity  was performed.     Findings: The common femoral vein, superficial femoral vein and  popliteal vein are fully compressible with spontaneous and augmentable  venous flow.           Impression    Impression: No evidence of deep venous thrombosis within the left  lower extremity.    TATI ULLOA MD       Medications - No data to display    Assessments & Plan (with Medical Decision Making)     I have reviewed the nursing notes.  I have reviewed the findings, diagnosis, plan and need for follow up with the patient.  No DVT was identified, no other abnormalities were found in the area patient was having pain.  Will treat as muscle strain.  Advised patient to apply compression  and ice, rest and avoid straining the area again for 7-10 days.  Patient appears frustrated and reports she will not rest the injured area.  Question what she can take for pain.  Advised patient to try Aleve and make sure she is taking appropriate doses of pain medication.    Stressed the importance of rest to help an injured area recover.   Also advised her to avoid heat and hot tubs to avoid increasing swelling at this time.    Should follow-up with primary care in 7-10 days if not improving, sooner if symptoms worsen or concerns develop.    Discharge Medication List as of 3/27/2018  7:51 PM          Final diagnoses:   Pain of left thigh          Irene Goodwin, MARCIO  03/28/18 1906

## 2018-03-28 ASSESSMENT — ENCOUNTER SYMPTOMS
CONSTITUTIONAL NEGATIVE: 1
JOINT SWELLING: 0

## 2018-03-28 NOTE — DISCHARGE INSTRUCTIONS
Muscle Strain in the Extremities  A muscle strain is a stretching and tearing of muscle fibers. This causes pain, especially when you move that muscle. There may also be some swelling and bruising.  Home care    Keep the hurt area raised to reduce pain and swelling. This is especially important during the first 48 hours.    Apply an ice pack over the injured area for 15 to 20 minutes every 3 to 6 hours. You should do this for the first 24 to 48 hours. You can make an ice pack by filling a plastic bag that seals at the top with ice cubes and then wrapping it with a thin towel. Be careful not to injure your skin with the ice treatments. Ice should never be applied directly to skin. Continue the use of ice packs for relief of pain and swelling as needed. After 48 hours, apply heat (warm shower or warm bath) for 15 to 20 minutes several times a day, or alternate ice and heat.    You may use over-the-counter pain medicine to control pain, unless another medicine was prescribed. If you have chronic liver or kidney disease or ever had a stomach ulcer or GI bleeding, talk with your healthcare provider before using these medicines.    For leg strains: If crutches have been recommended, don t put full weight on the hurt leg until you can do so without pain. You can return to sports when you are able to hop and run on the injured leg without pain.  Follow-up care  Follow up with your healthcare provider, or as advised.  When to seek medical advice  Call your healthcare provider right away if any of these occur:    The toes of the injured leg become swollen, cold, blue, numb, or tingly    Pain or swelling increases  Date Last Reviewed: 11/19/2015 2000-2017 [employer]. [address] [place] [address], [place] [address]. All rights reserved. This information is not intended as a substitute for professional medical care. Always follow your healthcare professional's instructions.

## 2018-09-05 NOTE — PATIENT INSTRUCTIONS
Before Your Surgery      Call your surgeon if there is any change in your health. This includes signs of a cold or flu (such as a sore throat, runny nose, cough, rash or fever).    Do not smoke, drink alcohol or take over the counter medicine (unless your surgeon or primary care doctor tells you to) for the 24 hours before and after surgery.    If you take prescribed drugs: Follow your doctor s orders about which medicines to take and which to stop until after surgery.    Eating and drinking prior to surgery: follow the instructions from your surgeon    Take a shower or bath the night before surgery. Use the soap your surgeon gave you to gently clean your skin. If you do not have soap from your surgeon, use your regular soap. Do not shave or scrub the surgery site.  Wear clean pajamas and have clean sheets on your bed.    Based on urinary dip and clinical picture  Follow-up cultures    Continue ceftriaxone

## 2018-11-18 ENCOUNTER — HEALTH MAINTENANCE LETTER (OUTPATIENT)
Age: 43
End: 2018-11-18

## 2018-12-14 ENCOUNTER — TELEPHONE (OUTPATIENT)
Dept: FAMILY MEDICINE | Facility: OTHER | Age: 43
End: 2018-12-14

## 2018-12-14 NOTE — PROGRESS NOTES
SUBJECTIVE:                                                    Tere Camarena is a 43 year old female who presents to clinic today for the following health issues:      Breast lump      Duration: 1 week    Description (location/character/radiation): right breast    Intensity:  2/10    Accompanying signs and symptoms: Right breast lump with pain.    History (similar episodes/previous evaluation): None    Precipitating or alleviating factors: None    Therapies tried and outcome: None       PROBLEMS TO ADD ON...    Problem list and histories reviewed & adjusted, as indicated.  Additional history: noted about a month ago.  Had similar on left which was benign.  We reviewed.      Patient Active Problem List   Diagnosis     ACP (advance care planning)     Past Surgical History:   Procedure Laterality Date     ABDOMEN SURGERY      hernia surgery X5     BIOPSY BREAST NEEDLE LOCALIZATION Left 1/30/2017    Procedure: BIOPSY BREAST NEEDLE LOCALIZATION;  Surgeon: Sita Rushing MD;  Location: [facility]     BREAST SURGERY Left 09/28/2016    biopsy     BREAST SURGERY Left 09/13/2016    biopsy     GYN SURGERY  2004    tubal ligation     ORTHOPEDIC SURGERY Left [id number]    left knee medial retinacular repair, VMO advancement, and reapir of partial tear of quadriceps tendon     ORTHOPEDIC SURGERY Left 04/18/2017    left knee manipulation       Social History     Tobacco Use     Smoking status: Former Smoker     Packs/day: 0.50     Years: 11.00     Pack years: 5.50     Smokeless tobacco: Never Used   Substance Use Topics     Alcohol use: Yes     Comment: Quit     Family History   Problem Relation Age of Onset     Other - See Comments Mother         back surgery     Other Cancer Father         skin cancer     Hypertension Father      Gastrointestinal Disease Father         Diverticulosis         Current Outpatient Medications   Medication Sig Dispense Refill     ibuprofen (ADVIL,MOTRIN) 800 MG tablet Take 1 tablet (800 mg) by mouth  "every 8 hours as needed for moderate pain 60 tablet 1     Allergies   Allergen Reactions     Peanuts [Peanut-Derived]      Eyes swell     Trees Other (See Comments)     Tree sap makes her eyes swell shut       ROS:  Constitutional, HEENT, cardiovascular, pulmonary, gi and gu systems are negative, except as otherwise noted.    OBJECTIVE:                                                    /60 (BP Location: Right arm, Patient Position: Chair, Cuff Size: Adult Regular)   Pulse 74   Temp 97.7  F (36.5  C) (Tympanic)   Ht 1.727 m (5' 8\")   Wt 73 kg (161 lb)   SpO2 98%   BMI 24.48 kg/m    Body mass index is 24.48 kg/m .  GENERAL APPEARANCE: Alert, no acute distress  Breast exam:  Fibrocystic change suspected in area of abnormality.  Cystic, mobile lateral right breast.  Similar on left as well.  No concerning masses I can feel.  Axillae normal with no adenopathy.   SKIN: no suspicious lesions or rashes to visualized skin  NEURO: Alert, oriented x 3, speech and mentation normal           ASSESSMENT/PLAN:                                                    1. Lump of right breast  Reviewed.  US and MRI diagnostic.  F/u with ongoing concerns.    - MA Diagnostic Bilateral w/Mamadou; Future  - US Breast Right Limited 1-3 Quadrants; Future          Carl Goodwin MD  [facility]      "

## 2018-12-14 NOTE — TELEPHONE ENCOUNTER
Ov8:18 AM    Reason for Call: OVERBOOK    Patient is having the following symptoms: Pts right breast has been sore for 1 month & she's noticed a lump within the past week.    The patient is requesting an appointment for sometime in Dec. with Dr. Goodwin.    Was an appointment offered for this call? No, patient didn't want to wait until Jan.  If yes : Appointment type              Date    Preferred method for responding to this message: Telephone call  What is your phone number ?[phone]    If we cannot reach you directly, may we leave a detailed response at the number you provided? Yes     Can this message wait until your PCP/provider returns, if unavailable today? Yes, PCP is out today.    Lisha Camarena

## 2018-12-17 ENCOUNTER — OFFICE VISIT (OUTPATIENT)
Dept: FAMILY MEDICINE | Facility: OTHER | Age: 43
End: 2018-12-17
Attending: FAMILY MEDICINE
Payer: COMMERCIAL

## 2018-12-17 VITALS
TEMPERATURE: 97.7 F | DIASTOLIC BLOOD PRESSURE: 60 MMHG | OXYGEN SATURATION: 98 % | BODY MASS INDEX: 24.4 KG/M2 | SYSTOLIC BLOOD PRESSURE: 110 MMHG | HEART RATE: 74 BPM | HEIGHT: 68 IN | WEIGHT: 161 LBS

## 2018-12-17 DIAGNOSIS — N63.10 LUMP OF RIGHT BREAST: Primary | ICD-10-CM

## 2018-12-17 PROCEDURE — 99213 OFFICE O/P EST LOW 20 MIN: CPT | Performed by: FAMILY MEDICINE

## 2018-12-17 PROCEDURE — G0463 HOSPITAL OUTPT CLINIC VISIT: HCPCS

## 2018-12-17 ASSESSMENT — ANXIETY QUESTIONNAIRES
6. BECOMING EASILY ANNOYED OR IRRITABLE: NOT AT ALL
GAD7 TOTAL SCORE: 2
7. FEELING AFRAID AS IF SOMETHING AWFUL MIGHT HAPPEN: NOT AT ALL
5. BEING SO RESTLESS THAT IT IS HARD TO SIT STILL: NOT AT ALL
1. FEELING NERVOUS, ANXIOUS, OR ON EDGE: SEVERAL DAYS
IF YOU CHECKED OFF ANY PROBLEMS ON THIS QUESTIONNAIRE, HOW DIFFICULT HAVE THESE PROBLEMS MADE IT FOR YOU TO DO YOUR WORK, TAKE CARE OF THINGS AT HOME, OR GET ALONG WITH OTHER PEOPLE: NOT DIFFICULT AT ALL
3. WORRYING TOO MUCH ABOUT DIFFERENT THINGS: NOT AT ALL
2. NOT BEING ABLE TO STOP OR CONTROL WORRYING: SEVERAL DAYS

## 2018-12-17 ASSESSMENT — PATIENT HEALTH QUESTIONNAIRE - PHQ9
5. POOR APPETITE OR OVEREATING: NOT AT ALL
SUM OF ALL RESPONSES TO PHQ QUESTIONS 1-9: 0

## 2018-12-17 ASSESSMENT — PAIN SCALES - GENERAL: PAINLEVEL: MILD PAIN (2)

## 2018-12-17 ASSESSMENT — MIFFLIN-ST. JEOR: SCORE: 1433.79

## 2018-12-17 NOTE — NURSING NOTE
"Chief Complaint   Patient presents with     Breast Mass       Initial /60 (BP Location: Right arm, Patient Position: Chair, Cuff Size: Adult Regular)   Pulse 74   Temp 97.7  F (36.5  C) (Tympanic)   Ht 1.727 m (5' 8\")   Wt 73 kg (161 lb)   SpO2 98%   BMI 24.48 kg/m   Estimated body mass index is 24.48 kg/m  as calculated from the following:    Height as of this encounter: 1.727 m (5' 8\").    Weight as of this encounter: 73 kg (161 lb).  Medication Reconciliation: complete    JUANCARLOS OLIVARES LPN    "

## 2018-12-18 ASSESSMENT — ANXIETY QUESTIONNAIRES: GAD7 TOTAL SCORE: 2

## 2018-12-19 ENCOUNTER — HOSPITAL ENCOUNTER (OUTPATIENT)
Dept: ULTRASOUND IMAGING | Facility: HOSPITAL | Age: 43
Discharge: HOME | End: 2018-12-19
Attending: FAMILY MEDICINE | Admitting: FAMILY MEDICINE
Payer: COMMERCIAL

## 2018-12-19 ENCOUNTER — ANCILLARY PROCEDURE (OUTPATIENT)
Dept: MAMMOGRAPHY | Facility: OTHER | Age: 43
End: 2018-12-19
Attending: FAMILY MEDICINE
Payer: COMMERCIAL

## 2018-12-19 DIAGNOSIS — N63.10 LUMP OF RIGHT BREAST: ICD-10-CM

## 2018-12-19 PROCEDURE — 76642 ULTRASOUND BREAST LIMITED: CPT | Mod: TC,RT

## 2018-12-19 PROCEDURE — G0279 TOMOSYNTHESIS, MAMMO: HCPCS | Mod: TC

## 2019-11-02 ENCOUNTER — HEALTH MAINTENANCE LETTER (OUTPATIENT)
Age: 44
End: 2019-11-02

## 2019-12-12 NOTE — DISCHARGE INSTRUCTIONS
Conjunctivitis, Nonspecific    The membrane that covers the white part of your eye (the conjunctiva) is inflamed. Inflammation happens when your body responds to an injury, allergic reaction, infection, or illness. Symptoms of inflammation in the eye may include redness, irritation, itching, swelling, or burning. These symptoms should go away within the next 24 hours. Conjunctivitis may be related to a particle that was in your eye. If so, it may wash out with your tears or irrigation treatment. Being exposed to liquid chemicals or fumes may also cause this reaction.   Home care    Apply a cold pack (ice in a plastic bag, wrapped in a towel) over the eye for 20 minutes at a time. This will reduce pain.    Artificial tears may be prescribed to reduce irritation or redness.  These should be used 3 to 4 times a day.    You may use acetaminophen or ibuprofen to control pain, unless another medicine was prescribed.(Note: If you have chronic liver or kidney disease, or if you have ever had a stomach ulcer or gastrointestinal bleeding, talk with your healthcare provider before using these medicines.)  Follow-up care  Follow up with your healthcare provider, or as advised.  When to seek medical advice  Call your healthcare provider right away if any of these occur:    Increased eyelid swelling    Increased eye pain    Increased redness or drainage from the eye    Increased blurry vision or increased sensitivity to light    Failure of normal vision to return within 24 to 48 hours    9363-9845 [employer]. [address] [place] [address], [place] [address]. All rights reserved. This information is not intended as a substitute for professional medical care. Always follow your healthcare professional's instructions.        Coping with Edema  What is edema?  Edema is the build-up of fluid in the body, which causes swelling. Swelling most commonly occurs in the feet, ankles, lower legs or hands.  Swelling can occur in the  Left patient message to call office back.    Per Shauna- She reviewed chest CT at [facility] and talked to Dr Hennessy. They would like patient to have a repeat CT in 3 months. And she would like me to get him scheduled with Dr Hennessy in March. I faxed chest CT to advocate already.   belly or chest may be a sign of a more severe problem.  Certain medicines or conditions can make the swelling worse.  Symptoms include:    Feet and lower legs get larger when you sit or walk.    Hands feel tight when you make a fist.    When you push on the skin, skin stays dented.    Shiny, tight skin.    Fast weight gain.  How is it treated?  Your care team may give you a medicine to reduce the swelling.  They may also suggest that you meet with a dietitian. He or she can help with food choices to reduce the swelling.  What can I do about the swelling?    Place your feet above your heart 3 times a day: Sit with your feet up on a stool with a pillow. Sit on the bed or couch with two pillows under your feet.    Do not stand for long periods of time.    Wear loose-fitting clothes.    Do not cross your legs.    Reduce the salt in your diet.   These foods are high in salt:    Chips, soup    Frozen meals, TV dinners    Rodriguez, lunch meat, ham    Sauces (soy, canned spaghetti sauce)    Walk or do other exercise.    Wear compression stockings.    Drink water as normal.    Weigh yourself every day at the same time to keep track of weight gain.  When should I call my care team?  Call your care team if:    You have a hard time breathing.    You gained 5 pounds or more in 1 week.    Your hands or feet feel cold when you touch them.    You are peeing very little or not at all.    Swelling is moving up your arms or legs.    Your tongue is swelling.    You cannot eat for more than a day  If you have any side effects, call us. We can help you manage these problems.  For more information,  see:  www.[facility].com  www.cancer.org/treatment/treatmentsandsideeffects/physicalsideeffects/dealingwithsymptomsathome  www.cancer.gov/cancertopics/coping/chemotherapy-and-you  Comments:  __________________________________________  __________________________________________  __________________________________________  __________________________________________  __________________________________________  __________________________________________  __________________________________________  For informational purposes only. Not to replace the advice of your health care provider.  Copyright   2014 [facility]. All rights reserved. [employer] 604803 - REV 03/16.  Tere,   You did not appear to have any serious problems related to your swelling.  The left postop leg shows now blood clots and the generalized edema will hopefully mobilize with a single lasix tab each day for the next week.  The sulfa antibiotic solution should help with the secondarily infected allergic conjunctivitis you have.  Good luck with your recovery so you can use that million dollar knee this summer.

## 2020-05-12 ENCOUNTER — VIRTUAL VISIT (OUTPATIENT)
Dept: FAMILY MEDICINE | Facility: OTHER | Age: 45
End: 2020-05-12
Attending: FAMILY MEDICINE

## 2020-05-12 VITALS — TEMPERATURE: 98.2 F | BODY MASS INDEX: 27.06 KG/M2 | WEIGHT: 178 LBS

## 2020-05-12 DIAGNOSIS — R05.9 COUGH: Primary | ICD-10-CM

## 2020-05-12 PROCEDURE — 99212 OFFICE O/P EST SF 10 MIN: CPT | Mod: 95 | Performed by: FAMILY MEDICINE

## 2020-05-12 ASSESSMENT — PAIN SCALES - GENERAL: PAINLEVEL: NO PAIN (0)

## 2020-05-12 NOTE — PROGRESS NOTES
"Tere Camarena is a 45 year old female who is being evaluated via a billable telephone visit.      The patient has been notified of following:     \"This telephone visit will be conducted via a call between you and your physician/provider. We have found that certain health care needs can be provided without the need for a physical exam.  This service lets us provide the care you need with a short phone conversation.  If a prescription is necessary we can send it directly to your pharmacy.  If lab work is needed we can place an order for that and you can then stop by our lab to have the test done at a later time.    Telephone visits are billed at different rates depending on your insurance coverage. During this emergency period, for some insurers they may be billed the same as an in-person visit.  Please reach out to your insurance provider with any questions.    If during the course of the call the physician/provider feels a telephone visit is not appropriate, you will not be charged for this service.\"    Patient has given verbal consent for Telephone visit?  Yes    What phone number would you like to be contacted at? [phone]    How would you like to obtain your AVS? MyChart    Subjective     Tere Camarena is a 45 year old female who presents to clinic today for the following health issues:    HPI questing COVID t testing.  Patient states that she she works in [employer] in the [employer] and is requesting COVID she reports a runny nose.  Testing.  [employer].  And is exposed to quite a bit of fisherman this last weekend.  She does not work in the healthcare field.  No known exposures.                 Reviewed and updated as needed this visit by Provider         Review of Systems          Objective   Reported vitals:  There were no vitals taken for this visit.     PSYCH: Alert and oriented times 3; coherent speech, normal   rate and volume, able to articulate logical thoughts, able   to abstract reason, no tangential " thoughts, no hallucinations   or delusions  Her affect is   RESP: No cough, no audible wheezing, able to talk in full sentences  Remainder of exam unable to be completed due to telephone visits            Assessment/Plan:  1. Cough  I did advise her that currently she does not meet the criteria.  And she did want the criteria sent to her.  Advised if she should worsening follow-up here in clinic.  She does not have any of the comorbidity diagnosis is.    No follow-ups on file.      Phone call duration:  12 minutes    Froilan Zamora MD

## 2020-05-12 NOTE — LETTER
May 12, 2020      Tere Camarena  [address] [facility] [address]        Dear ,    As requested enclosed is a copy of the current recommendations for testing.              If you have any questions or concerns, please call the clinic at the number listed above.       Sincerely,        Froilan Zamora MD

## 2020-05-12 NOTE — NURSING NOTE
Patient scheduled for a phone visit covid symptoms started 4 days prior. Cough, achy body, runny nose, chills and no fever 98.2. She feels it may be a regular cold. Medication Reconciliation: complete.    Noelle Urban LPN  5/12/2020 10:39 AM

## 2020-11-14 ENCOUNTER — HEALTH MAINTENANCE LETTER (OUTPATIENT)
Age: 45
End: 2020-11-14

## 2020-11-20 ENCOUNTER — ALLIED HEALTH/NURSE VISIT (OUTPATIENT)
Dept: FAMILY MEDICINE | Facility: OTHER | Age: 45
End: 2020-11-20
Attending: FAMILY MEDICINE
Payer: OTHER GOVERNMENT

## 2020-11-20 DIAGNOSIS — R50.9 FEVER: ICD-10-CM

## 2020-11-20 DIAGNOSIS — R11.10 VOMITING: Primary | ICD-10-CM

## 2020-11-20 DIAGNOSIS — R05.9 COUGH: ICD-10-CM

## 2020-11-20 PROCEDURE — U0003 INFECTIOUS AGENT DETECTION BY NUCLEIC ACID (DNA OR RNA); SEVERE ACUTE RESPIRATORY SYNDROME CORONAVIRUS 2 (SARS-COV-2) (CORONAVIRUS DISEASE [COVID-19]), AMPLIFIED PROBE TECHNIQUE, MAKING USE OF HIGH THROUGHPUT TECHNOLOGIES AS DESCRIBED BY CMS-2020-01-R: HCPCS | Mod: ZL | Performed by: FAMILY MEDICINE

## 2020-11-20 PROCEDURE — C9803 HOPD COVID-19 SPEC COLLECT: HCPCS

## 2020-11-20 PROCEDURE — 99207 PR NO CHARGE NURSE ONLY: CPT

## 2020-11-23 LAB
SARS-COV-2 RNA SPEC QL NAA+PROBE: NOT DETECTED
SPECIMEN SOURCE: NORMAL

## 2020-11-25 ENCOUNTER — APPOINTMENT (OUTPATIENT)
Dept: FAMILY MEDICINE | Facility: OTHER | Age: 45
End: 2020-11-25
Attending: FAMILY MEDICINE
Payer: COMMERCIAL

## 2020-12-04 ENCOUNTER — VIRTUAL VISIT (OUTPATIENT)
Dept: FAMILY MEDICINE | Facility: OTHER | Age: 45
End: 2020-12-04
Payer: MEDICAID

## 2020-12-04 DIAGNOSIS — R53.83 FATIGUE, UNSPECIFIED TYPE: ICD-10-CM

## 2020-12-04 DIAGNOSIS — R52 BODY ACHES: ICD-10-CM

## 2020-12-04 DIAGNOSIS — R51.9 ACUTE NONINTRACTABLE HEADACHE, UNSPECIFIED HEADACHE TYPE: Primary | ICD-10-CM

## 2020-12-04 PROCEDURE — 99441 PR PHYSICIAN TELEPHONE EVALUATION 5-10 MIN: CPT | Performed by: FAMILY MEDICINE

## 2020-12-04 SDOH — HEALTH STABILITY: MENTAL HEALTH: HOW OFTEN DO YOU HAVE 6 OR MORE DRINKS ON ONE OCCASION?: NOT ASKED

## 2020-12-04 SDOH — HEALTH STABILITY: MENTAL HEALTH: HOW OFTEN DO YOU HAVE A DRINK CONTAINING ALCOHOL?: NOT ASKED

## 2020-12-04 SDOH — HEALTH STABILITY: MENTAL HEALTH: HOW MANY STANDARD DRINKS CONTAINING ALCOHOL DO YOU HAVE ON A TYPICAL DAY?: NOT ASKED

## 2020-12-04 ASSESSMENT — PATIENT HEALTH QUESTIONNAIRE - PHQ9
SUM OF ALL RESPONSES TO PHQ QUESTIONS 1-9: 0
5. POOR APPETITE OR OVEREATING: NOT AT ALL

## 2020-12-04 ASSESSMENT — ENCOUNTER SYMPTOMS
HEADACHES: 1
SINUS PRESSURE: 0
MYALGIAS: 1
ABDOMINAL PAIN: 1
FEVER: 1
SORE THROAT: 1
FATIGUE: 1
SHORTNESS OF BREATH: 0
COUGH: 1
RHINORRHEA: 0
CHILLS: 1
SINUS PAIN: 0
DIARRHEA: 1

## 2020-12-04 ASSESSMENT — ANXIETY QUESTIONNAIRES
2. NOT BEING ABLE TO STOP OR CONTROL WORRYING: NOT AT ALL
IF YOU CHECKED OFF ANY PROBLEMS ON THIS QUESTIONNAIRE, HOW DIFFICULT HAVE THESE PROBLEMS MADE IT FOR YOU TO DO YOUR WORK, TAKE CARE OF THINGS AT HOME, OR GET ALONG WITH OTHER PEOPLE: NOT DIFFICULT AT ALL
7. FEELING AFRAID AS IF SOMETHING AWFUL MIGHT HAPPEN: SEVERAL DAYS
6. BECOMING EASILY ANNOYED OR IRRITABLE: SEVERAL DAYS
1. FEELING NERVOUS, ANXIOUS, OR ON EDGE: SEVERAL DAYS
3. WORRYING TOO MUCH ABOUT DIFFERENT THINGS: NOT AT ALL
GAD7 TOTAL SCORE: 3
5. BEING SO RESTLESS THAT IT IS HARD TO SIT STILL: NOT AT ALL

## 2020-12-04 NOTE — NURSING NOTE
"Chief Complaint   Patient presents with     Headache     eyes are swelling again.          Initial There were no vitals taken for this visit. Estimated body mass index is 27.06 kg/m  as calculated from the following:    Height as of 12/17/18: 1.727 m (5' 8\").    Weight as of 5/12/20: 80.7 kg (178 lb).    Medication Reconciliation: complete      Norma J. Gosselin, LPN  "

## 2020-12-04 NOTE — PROGRESS NOTES
"Tere Camarena is a 45 year old female who is being evaluated via a billable telephone visit.      The patient has been notified of following:     \"This telephone visit will be conducted via a call between you and your physician/provider. We have found that certain health care needs can be provided without the need for a physical exam.  This service lets us provide the care you need with a short phone conversation.  If a prescription is necessary we can send it directly to your pharmacy.  If lab work is needed we can place an order for that and you can then stop by our lab to have the test done at a later time.    Telephone visits are billed at different rates depending on your insurance coverage. During this emergency period, for some insurers they may be billed the same as an in-person visit.  Please reach out to your insurance provider with any questions.    If during the course of the call the physician/provider feels a telephone visit is not appropriate, you will not be charged for this service.\"    Patient has given verbal consent for Telephone visit?  Yes    What phone number would you like to be contacted at? [phone]    How would you like to obtain your AVS? MyChart    Subjective     Tere Camarena is a 45 year old female who presents via phone visit today for the following health issues:    Chief Complaint   Patient presents with     Headache     eyes are swelling again.      Stomach pain, body aches, exhausted.     HPI  Headache, Fatigue, Body Aches:  Symptoms started on 11/17 with fever, stomach pain, vomiting, body aches, and fatigue.  She her eyes then \"swelled shut,\" becoming \"weepy\" with discharge.  She believes the vomiting has been related to the severity of her headaches and has since resolve.  Headaches have been located in the center of her forehead and occasionally behind her eye.  She has been taking OTC pain medications and Excedrin Migraine with improvement in her pain level, though the " "headache does not seem to dissipate.  She has continued to have a low-grade temperature intermittently but nothing as high as her Tmax 101.9 F which occurred around symptom onset.  Swelling of her eyes seemed to improve before worsening again recently.  She has a history of migraines but nothing this consistent.  She tested negative for COVID on 11/20; however, her boyfriend was exposed through work on 11/30.    Review of Systems   Constitutional: Positive for chills, fatigue and fever.   HENT: Positive for sore throat. Negative for congestion, ear pain, postnasal drip, rhinorrhea, sinus pressure and sinus pain.    Respiratory: Positive for cough. Negative for shortness of breath.    Cardiovascular: Negative for chest pain.   Gastrointestinal: Positive for abdominal pain and diarrhea.   Musculoskeletal: Positive for myalgias.   Skin: Positive for rash.   Neurological: Positive for headaches.      Objective   Vitals - Patient Reported  Weight (Patient Reported): 83.9 kg (185 lb)  Height (Patient Reported): 172.7 cm (5' 8\")  BMI (Based on Pt Reported Ht/Wt): 28.13  Temperature (Patient Reported): 98.9  F (37.2  C)(oral)  Pain Score: Severe Pain (6)  Pain Loc: Head  alert and no distress  PSYCH: Alert and oriented times 3; coherent speech, normal   rate and volume, able to articulate logical thoughts, able   to abstract reason, no tangential thoughts, no hallucinations   or delusions  Her affect is normal  RESP: No cough, no audible wheezing, able to talk in full sentences  Remainder of exam unable to be completed due to telephone visits    Assessment/Plan:    Assessment & Plan     Acute nonintractable headache, unspecified headache type  Fatigue, unspecified type  Body aches  Her symptoms are concerning for COVID despite previous negative test.  She also meets criteria based on exposure.  Will repeat COVID-19 test.  Advised to start quarantine while awaiting test results.  If positive, will need to quarantine for CDC " recommended length of time.  Counseled on symptom management and emergent symptoms requiring reevaluation.  If test is negative and symptoms persist or worsen, advised her to be further evaluated in person.  - Symptomatic COVID-19 Virus (Coronavirus) by PCR; Future     DO [facility]    Phone call duration:  10 minutes

## 2020-12-05 ENCOUNTER — ALLIED HEALTH/NURSE VISIT (OUTPATIENT)
Dept: FAMILY MEDICINE | Facility: OTHER | Age: 45
End: 2020-12-05
Attending: FAMILY MEDICINE
Payer: MEDICAID

## 2020-12-05 DIAGNOSIS — R53.83 FATIGUE, UNSPECIFIED TYPE: ICD-10-CM

## 2020-12-05 DIAGNOSIS — R52 BODY ACHES: ICD-10-CM

## 2020-12-05 DIAGNOSIS — R51.9 ACUTE NONINTRACTABLE HEADACHE, UNSPECIFIED HEADACHE TYPE: ICD-10-CM

## 2020-12-05 PROCEDURE — C9803 HOPD COVID-19 SPEC COLLECT: HCPCS

## 2020-12-05 PROCEDURE — U0003 INFECTIOUS AGENT DETECTION BY NUCLEIC ACID (DNA OR RNA); SEVERE ACUTE RESPIRATORY SYNDROME CORONAVIRUS 2 (SARS-COV-2) (CORONAVIRUS DISEASE [COVID-19]), AMPLIFIED PROBE TECHNIQUE, MAKING USE OF HIGH THROUGHPUT TECHNOLOGIES AS DESCRIBED BY CMS-2020-01-R: HCPCS | Mod: ZL | Performed by: FAMILY MEDICINE

## 2020-12-05 PROCEDURE — 99207 PR NO CHARGE NURSE ONLY: CPT

## 2020-12-05 ASSESSMENT — ANXIETY QUESTIONNAIRES: GAD7 TOTAL SCORE: 3

## 2020-12-05 NOTE — NURSING NOTE
Patient is here for covid testing for headache and body aches  Milena Bell on 12/5/2020 at 11:53 AM

## 2020-12-06 LAB
SARS-COV-2 RNA SPEC QL NAA+PROBE: NOT DETECTED
SPECIMEN SOURCE: NORMAL

## 2020-12-09 ENCOUNTER — OFFICE VISIT (OUTPATIENT)
Dept: FAMILY MEDICINE | Facility: OTHER | Age: 45
End: 2020-12-09
Attending: FAMILY MEDICINE
Payer: MEDICAID

## 2020-12-09 VITALS
HEART RATE: 63 BPM | SYSTOLIC BLOOD PRESSURE: 126 MMHG | DIASTOLIC BLOOD PRESSURE: 74 MMHG | TEMPERATURE: 97.8 F | OXYGEN SATURATION: 98 % | WEIGHT: 200.6 LBS | HEIGHT: 67 IN | RESPIRATION RATE: 16 BRPM | BODY MASS INDEX: 31.48 KG/M2

## 2020-12-09 DIAGNOSIS — J01.00 SUBACUTE MAXILLARY SINUSITIS: Primary | ICD-10-CM

## 2020-12-09 LAB
ALBUMIN SERPL-MCNC: 4 G/DL (ref 3.5–5.7)
ALP SERPL-CCNC: 53 U/L (ref 34–104)
ALT SERPL W P-5'-P-CCNC: 19 U/L (ref 7–52)
ANION GAP SERPL CALCULATED.3IONS-SCNC: 8 MMOL/L (ref 3–14)
AST SERPL W P-5'-P-CCNC: 21 U/L (ref 13–39)
BASOPHILS # BLD AUTO: 0 10E9/L (ref 0–0.2)
BASOPHILS NFR BLD AUTO: 0.4 %
BILIRUB SERPL-MCNC: 0.6 MG/DL (ref 0.3–1)
BUN SERPL-MCNC: 8 MG/DL (ref 7–25)
CALCIUM SERPL-MCNC: 8.6 MG/DL (ref 8.6–10.3)
CHLORIDE SERPL-SCNC: 104 MMOL/L (ref 98–107)
CO2 SERPL-SCNC: 23 MMOL/L (ref 21–31)
CREAT SERPL-MCNC: 0.65 MG/DL (ref 0.6–1.2)
DIFFERENTIAL METHOD BLD: ABNORMAL
EOSINOPHIL # BLD AUTO: 0.1 10E9/L (ref 0–0.7)
EOSINOPHIL NFR BLD AUTO: 1.3 %
ERYTHROCYTE [DISTWIDTH] IN BLOOD BY AUTOMATED COUNT: 13.7 % (ref 10–15)
GFR SERPL CREATININE-BSD FRML MDRD: >90 ML/MIN/{1.73_M2}
GLUCOSE SERPL-MCNC: 92 MG/DL (ref 70–105)
HCT VFR BLD AUTO: 44 % (ref 35–47)
HGB BLD-MCNC: 15.2 G/DL (ref 11.7–15.7)
IMM GRANULOCYTES # BLD: 0 10E9/L (ref 0–0.4)
IMM GRANULOCYTES NFR BLD: 0.4 %
LYMPHOCYTES # BLD AUTO: 2.1 10E9/L (ref 0.8–5.3)
LYMPHOCYTES NFR BLD AUTO: 21.7 %
MCH RBC QN AUTO: 35 PG (ref 26.5–33)
MCHC RBC AUTO-ENTMCNC: 34.5 G/DL (ref 31.5–36.5)
MCV RBC AUTO: 101 FL (ref 78–100)
MONOCYTES # BLD AUTO: 0.6 10E9/L (ref 0–1.3)
MONOCYTES NFR BLD AUTO: 6.7 %
NEUTROPHILS # BLD AUTO: 6.7 10E9/L (ref 1.6–8.3)
NEUTROPHILS NFR BLD AUTO: 69.5 %
PLATELET # BLD AUTO: 215 10E9/L (ref 150–450)
POTASSIUM SERPL-SCNC: 3.8 MMOL/L (ref 3.5–5.1)
PROT SERPL-MCNC: 6.7 G/DL (ref 6.4–8.9)
RBC # BLD AUTO: 4.34 10E12/L (ref 3.8–5.2)
SODIUM SERPL-SCNC: 135 MMOL/L (ref 134–144)
TSH SERPL DL<=0.05 MIU/L-ACNC: 4.34 IU/ML (ref 0.34–5.6)
WBC # BLD AUTO: 9.6 10E9/L (ref 4–11)

## 2020-12-09 PROCEDURE — 84443 ASSAY THYROID STIM HORMONE: CPT | Mod: ZL | Performed by: FAMILY MEDICINE

## 2020-12-09 PROCEDURE — 85025 COMPLETE CBC W/AUTO DIFF WBC: CPT | Mod: ZL | Performed by: FAMILY MEDICINE

## 2020-12-09 PROCEDURE — 99213 OFFICE O/P EST LOW 20 MIN: CPT | Performed by: FAMILY MEDICINE

## 2020-12-09 PROCEDURE — 36415 COLL VENOUS BLD VENIPUNCTURE: CPT | Mod: ZL | Performed by: FAMILY MEDICINE

## 2020-12-09 PROCEDURE — 80053 COMPREHEN METABOLIC PANEL: CPT | Mod: ZL | Performed by: FAMILY MEDICINE

## 2020-12-09 RX ORDER — ECHINACEA PURPUREA EXTRACT 125 MG
1 TABLET ORAL 2 TIMES DAILY
Qty: 104 ML | Refills: 1 | Status: SHIPPED | OUTPATIENT
Start: 2020-12-09 | End: 2020-12-23

## 2020-12-09 SDOH — HEALTH STABILITY: MENTAL HEALTH: HOW MANY STANDARD DRINKS CONTAINING ALCOHOL DO YOU HAVE ON A TYPICAL DAY?: 1 OR 2

## 2020-12-09 SDOH — HEALTH STABILITY: MENTAL HEALTH: HOW OFTEN DO YOU HAVE 6 OR MORE DRINKS ON ONE OCCASION?: NEVER

## 2020-12-09 SDOH — HEALTH STABILITY: MENTAL HEALTH: HOW OFTEN DO YOU HAVE A DRINK CONTAINING ALCOHOL?: 2-4 TIMES A MONTH

## 2020-12-09 ASSESSMENT — PAIN SCALES - GENERAL: PAINLEVEL: SEVERE PAIN (7)

## 2020-12-09 ASSESSMENT — ENCOUNTER SYMPTOMS
CHILLS: 0
NAUSEA: 1
COUGH: 1
SHORTNESS OF BREATH: 0
RHINORRHEA: 0
SORE THROAT: 0
DIARRHEA: 1
ABDOMINAL PAIN: 1
HEADACHES: 1
MYALGIAS: 1
SINUS PAIN: 1
VOMITING: 0
FATIGUE: 1
FEVER: 0
SINUS PRESSURE: 1

## 2020-12-09 ASSESSMENT — MIFFLIN-ST. JEOR: SCORE: 1587.55

## 2020-12-09 NOTE — NURSING NOTE
"Chief Complaint   Patient presents with     Headache     Non stop for 3.5 weeks, started around 11/18/2020, 7/10      Abdominal Pain     upper stomach, stabbing pain, feels a little better but still noticeable         Initial /74 (BP Location: Right arm, Patient Position: Sitting, Cuff Size: Adult Regular)   Pulse 63   Temp 97.8  F (36.6  C) (Temporal)   Resp 16   Ht 1.702 m (5' 7\")   Wt 91 kg (200 lb 9.6 oz)   LMP 11/12/2020   SpO2 98%   BMI 31.42 kg/m   Estimated body mass index is 31.42 kg/m  as calculated from the following:    Height as of this encounter: 1.702 m (5' 7\").    Weight as of this encounter: 91 kg (200 lb 9.6 oz).         Medication Reconciliation: Complete      Jennifer King LPN   "

## 2020-12-09 NOTE — PROGRESS NOTES
"  SUBJECTIVE:   Tere Camarena is a 45 year old female who presents to clinic today for the following health issues:    HPI  Sinusitis:  Her abdominal pain has improved since her previous telephone visit.  However, she is still experiencing exhaustion and headache.  Her eyes are \"much better\" than previously.  They are not as swollen, though she is still cleaning \"crust\" out of them in the morning.  Her headaches are still occurring daily.  They improve in severity with OTC pain medications but never truly resolve.  She has felt like her vision has been blurred.    History reviewed. No pertinent past medical history.   Past Surgical History:   Procedure Laterality Date     ABDOMEN SURGERY      hernia surgery X5     BIOPSY BREAST NEEDLE LOCALIZATION Left 1/30/2017    Procedure: BIOPSY BREAST NEEDLE LOCALIZATION;  Surgeon: Sita Rushing MD;  Location: [facility] OR     BREAST SURGERY Left 09/28/2016    biopsy     BREAST SURGERY Left 09/13/2016    biopsy     GYN SURGERY  2004    tubal ligation     ORTHOPEDIC SURGERY Left [id number]    left knee medial retinacular repair, VMO advancement, and reapir of partial tear of quadriceps tendon     ORTHOPEDIC SURGERY Left 04/18/2017    left knee manipulation     Family History   Problem Relation Age of Onset     Other - See Comments Mother         back surgery     Other Cancer Father         skin cancer     Hypertension Father      Gastrointestinal Disease Father         Diverticulosis     Social History     Tobacco Use     Smoking status: Current Every Day Smoker     Packs/day: 1.00     Years: 11.00     Pack years: 11.00     Smokeless tobacco: Never Used   Substance Use Topics     Alcohol use: Yes     Alcohol/week: 5.0 standard drinks     Types: 5 Cans of beer per week     Frequency: 2-4 times a month     Drinks per session: 1 or 2     Binge frequency: Never     Comment:  weekly      Current Outpatient Medications   Medication Sig Dispense Refill     amoxicillin-clavulanate " "(AUGMENTIN) 875-125 MG tablet Take 1 tablet by mouth 2 times daily for 10 days 20 tablet 0     sodium chloride (OCEAN) 0.65 % nasal spray Spray 1 spray in nostril 2 times daily 104 mL 1     Allergies   Allergen Reactions     Peanuts [Peanut-Derived]      Eyes swell     Trees Other (See Comments)     Tree sap makes her eyes swell shut     Review of Systems   Constitutional: Positive for fatigue. Negative for chills and fever.   HENT: Positive for congestion, postnasal drip, sinus pressure and sinus pain. Negative for ear pain, rhinorrhea and sore throat.    Respiratory: Positive for cough. Negative for shortness of breath.    Gastrointestinal: Positive for abdominal pain, diarrhea and nausea. Negative for vomiting.   Musculoskeletal: Positive for myalgias.   Skin: Negative for rash.   Neurological: Positive for headaches.      Visual Acuity Screening - Rahman Chart   Visualacuity OD (right eye): 10/ 16   Visual acuity OS (left eye): 10/ 12.5   Visual acuity OU (both eyes): 10/ 12.5   Corrective lenses worn: Yes     OBJECTIVE:     /74 (BP Location: Right arm, Patient Position: Sitting, Cuff Size: Adult Regular)   Pulse 63   Temp 97.8  F (36.6  C) (Temporal)   Resp 16   Ht 1.702 m (5' 7\")   Wt 91 kg (200 lb 9.6 oz)   LMP 11/12/2020   SpO2 98%   BMI 31.42 kg/m    Body mass index is 31.42 kg/m .  Physical Exam  Constitutional:       Appearance: Normal appearance.   HENT:      Ears:      Comments: Middle ear effusion without erythema of TM.     Nose: Congestion present.      Right Sinus: Frontal sinus tenderness present.      Mouth/Throat:      Mouth: Mucous membranes are moist.      Pharynx: Oropharynx is clear. No oropharyngeal exudate or posterior oropharyngeal erythema.      Comments: Clear mucoid posterior oropharynx drainage.  Eyes:      General:         Right eye: No discharge.         Left eye: No discharge.      Extraocular Movements: Extraocular movements intact.      Pupils: Pupils are equal, " round, and reactive to light.   Neck:      Musculoskeletal: Neck supple. No muscular tenderness.   Cardiovascular:      Rate and Rhythm: Normal rate and regular rhythm.   Pulmonary:      Effort: Pulmonary effort is normal.      Breath sounds: Normal breath sounds.   Lymphadenopathy:      Cervical: No cervical adenopathy.   Neurological:      General: No focal deficit present.      Mental Status: She is alert.      Cranial Nerves: No cranial nerve deficit.     Diagnostic Test Results:  Results for orders placed or performed in visit on 12/09/20   TSH Reflex GH     Status: None   Result Value Ref Range    TSH Reflex 4.34 0.34 - 5.60 IU/mL   Comprehensive Metabolic Panel     Status: None   Result Value Ref Range    Sodium 135 134 - 144 mmol/L    Potassium 3.8 3.5 - 5.1 mmol/L    Chloride 104 98 - 107 mmol/L    Carbon Dioxide 23 21 - 31 mmol/L    Anion Gap 8 3 - 14 mmol/L    Glucose 92 70 - 105 mg/dL    Urea Nitrogen 8 7 - 25 mg/dL    Creatinine 0.65 0.60 - 1.20 mg/dL    GFR Estimate >90 >60 mL/min/[1.73_m2]    GFR Estimate If Black >90 >60 mL/min/[1.73_m2]    Calcium 8.6 8.6 - 10.3 mg/dL    Bilirubin Total 0.6 0.3 - 1.0 mg/dL    Albumin 4.0 3.5 - 5.7 g/dL    Protein Total 6.7 6.4 - 8.9 g/dL    Alkaline Phosphatase 53 34 - 104 U/L    ALT 19 7 - 52 U/L    AST 21 13 - 39 U/L   CBC and Differential     Status: Abnormal   Result Value Ref Range    WBC 9.6 4.0 - 11.0 10e9/L    RBC Count 4.34 3.8 - 5.2 10e12/L    Hemoglobin 15.2 11.7 - 15.7 g/dL    Hematocrit 44.0 35.0 - 47.0 %     (H) 78 - 100 fl    MCH 35.0 (H) 26.5 - 33.0 pg    MCHC 34.5 31.5 - 36.5 g/dL    RDW 13.7 10.0 - 15.0 %    Platelet Count 215 150 - 450 10e9/L    Diff Method Automated Method     % Neutrophils 69.5 %    % Lymphocytes 21.7 %    % Monocytes 6.7 %    % Eosinophils 1.3 %    % Basophils 0.4 %    % Immature Granulocytes 0.4 %    Absolute Neutrophil 6.7 1.6 - 8.3 10e9/L    Absolute Lymphocytes 2.1 0.8 - 5.3 10e9/L    Absolute Monocytes 0.6 0.0 - 1.3  10e9/L    Absolute Eosinophils 0.1 0.0 - 0.7 10e9/L    Absolute Basophils 0.0 0.0 - 0.2 10e9/L    Abs Immature Granulocytes 0.0 0 - 0.4 10e9/L     ASSESSMENT/PLAN:     1. Subacute maxillary sinusitis  Laboratory studies without significant abnormalities.  Neurologic exam findings normal and vision test appropriate.  Given her symptoms and symptom length, she would benefit from treatment for sinus infection.  Start course of Augmentin.  Start nasal saline rinse.  Instructed on proper use.  Follow-up if no improvement in symptoms after 2-3 days on antibiotics.  - amoxicillin-clavulanate (AUGMENTIN) 875-125 MG tablet; Take 1 tablet by mouth 2 times daily for 10 days  Dispense: 20 tablet; Refill: 0  - sodium chloride (OCEAN) 0.65 % nasal spray; Spray 1 spray in nostril 2 times daily  Dispense: 104 mL; Refill: 1  - TSH Reflex GH  - Comprehensive Metabolic Panel  - CBC and Differential      DO [facility] \[facility]\""

## 2020-12-23 ENCOUNTER — OFFICE VISIT (OUTPATIENT)
Dept: FAMILY MEDICINE | Facility: OTHER | Age: 45
End: 2020-12-23
Attending: FAMILY MEDICINE
Payer: MEDICAID

## 2020-12-23 VITALS
BODY MASS INDEX: 30.34 KG/M2 | OXYGEN SATURATION: 97 % | RESPIRATION RATE: 16 BRPM | HEIGHT: 68 IN | TEMPERATURE: 98.7 F | HEART RATE: 71 BPM | DIASTOLIC BLOOD PRESSURE: 72 MMHG | WEIGHT: 200.2 LBS | SYSTOLIC BLOOD PRESSURE: 130 MMHG

## 2020-12-23 DIAGNOSIS — R87.810 ASCUS WITH POSITIVE HIGH RISK HPV CERVICAL: ICD-10-CM

## 2020-12-23 DIAGNOSIS — R87.610 ASCUS WITH POSITIVE HIGH RISK HPV CERVICAL: ICD-10-CM

## 2020-12-23 DIAGNOSIS — Z12.4 ENCOUNTER FOR SCREENING FOR CERVICAL CANCER: ICD-10-CM

## 2020-12-23 DIAGNOSIS — Z12.31 ENCOUNTER FOR SCREENING MAMMOGRAM FOR BREAST CANCER: ICD-10-CM

## 2020-12-23 DIAGNOSIS — R00.2 PALPITATIONS: ICD-10-CM

## 2020-12-23 DIAGNOSIS — J32.0 CHRONIC MAXILLARY SINUSITIS: ICD-10-CM

## 2020-12-23 DIAGNOSIS — B97.7 HIGH RISK HPV INFECTION: ICD-10-CM

## 2020-12-23 DIAGNOSIS — G43.009 MIGRAINE WITHOUT AURA AND WITHOUT STATUS MIGRAINOSUS, NOT INTRACTABLE: ICD-10-CM

## 2020-12-23 DIAGNOSIS — Z00.00 ANNUAL PHYSICAL EXAM: Primary | ICD-10-CM

## 2020-12-23 DIAGNOSIS — K62.5 BRIGHT RED RECTAL BLEEDING: ICD-10-CM

## 2020-12-23 PROCEDURE — 99396 PREV VISIT EST AGE 40-64: CPT | Performed by: FAMILY MEDICINE

## 2020-12-23 PROCEDURE — 93005 ELECTROCARDIOGRAM TRACING: CPT

## 2020-12-23 PROCEDURE — 99214 OFFICE O/P EST MOD 30 MIN: CPT | Mod: 25 | Performed by: FAMILY MEDICINE

## 2020-12-23 PROCEDURE — 93010 ELECTROCARDIOGRAM REPORT: CPT | Performed by: INTERNAL MEDICINE

## 2020-12-23 PROCEDURE — 88142 CYTOPATH C/V THIN LAYER: CPT | Performed by: FAMILY MEDICINE

## 2020-12-23 PROCEDURE — 87624 HPV HI-RISK TYP POOLED RSLT: CPT | Mod: ZL | Performed by: FAMILY MEDICINE

## 2020-12-23 PROCEDURE — G0123 SCREEN CERV/VAG THIN LAYER: HCPCS | Performed by: FAMILY MEDICINE

## 2020-12-23 RX ORDER — TOPIRAMATE 50 MG/1
50 TABLET, FILM COATED ORAL 2 TIMES DAILY
Qty: 60 TABLET | Refills: 1 | Status: SHIPPED | OUTPATIENT
Start: 2021-01-14 | End: 2022-01-10

## 2020-12-23 RX ORDER — TOPIRAMATE 25 MG/1
TABLET, FILM COATED ORAL
Qty: 21 TABLET | Refills: 0 | Status: SHIPPED | OUTPATIENT
Start: 2020-12-23 | End: 2021-02-02

## 2020-12-23 RX ORDER — TOPIRAMATE 25 MG/1
TABLET, FILM COATED ORAL
Qty: 21 TABLET | Refills: 0 | Status: SHIPPED | OUTPATIENT
Start: 2021-01-06 | End: 2021-02-02

## 2020-12-23 ASSESSMENT — PAIN SCALES - GENERAL: PAINLEVEL: NO PAIN (0)

## 2020-12-23 ASSESSMENT — PATIENT HEALTH QUESTIONNAIRE - PHQ9
5. POOR APPETITE OR OVEREATING: NOT AT ALL
SUM OF ALL RESPONSES TO PHQ QUESTIONS 1-9: 0

## 2020-12-23 ASSESSMENT — ANXIETY QUESTIONNAIRES
7. FEELING AFRAID AS IF SOMETHING AWFUL MIGHT HAPPEN: NOT AT ALL
3. WORRYING TOO MUCH ABOUT DIFFERENT THINGS: NOT AT ALL
2. NOT BEING ABLE TO STOP OR CONTROL WORRYING: NOT AT ALL
IF YOU CHECKED OFF ANY PROBLEMS ON THIS QUESTIONNAIRE, HOW DIFFICULT HAVE THESE PROBLEMS MADE IT FOR YOU TO DO YOUR WORK, TAKE CARE OF THINGS AT HOME, OR GET ALONG WITH OTHER PEOPLE: NOT DIFFICULT AT ALL
GAD7 TOTAL SCORE: 0
1. FEELING NERVOUS, ANXIOUS, OR ON EDGE: NOT AT ALL
6. BECOMING EASILY ANNOYED OR IRRITABLE: NOT AT ALL
5. BEING SO RESTLESS THAT IT IS HARD TO SIT STILL: NOT AT ALL

## 2020-12-23 ASSESSMENT — MIFFLIN-ST. JEOR: SCORE: 1597.63
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
## 2020-12-23 NOTE — NURSING NOTE
"Chief Complaint   Patient presents with     Physical     Annual         Initial /72   Pulse 71   Temp 98.7  F (37.1  C) (Tympanic)   Resp 16   Ht 1.721 m (5' 7.75\")   Wt 90.8 kg (200 lb 3.2 oz)   LMP 12/13/2020   SpO2 97%   BMI 30.67 kg/m   Estimated body mass index is 30.67 kg/m  as calculated from the following:    Height as of this encounter: 1.721 m (5' 7.75\").    Weight as of this encounter: 90.8 kg (200 lb 3.2 oz).         Medication Reconciliation: Complete      Norma J. Gosselin, LPN   "

## 2020-12-24 LAB — INTERPRETATION ECG - MUSE: NORMAL

## 2020-12-24 ASSESSMENT — ANXIETY QUESTIONNAIRES: GAD7 TOTAL SCORE: 0

## 2020-12-31 LAB
FINAL DIAGNOSIS: ABNORMAL
HPV HR 12 DNA CVX QL NAA+PROBE: POSITIVE
HPV16 DNA SPEC QL NAA+PROBE: NEGATIVE
HPV18 DNA SPEC QL NAA+PROBE: NEGATIVE
SPECIMEN DESCRIPTION: ABNORMAL
SPECIMEN SOURCE CVX/VAG CYTO: ABNORMAL

## 2021-01-04 ENCOUNTER — MYC MEDICAL ADVICE (OUTPATIENT)
Dept: FAMILY MEDICINE | Facility: OTHER | Age: 46
End: 2021-01-04

## 2021-01-04 DIAGNOSIS — Z12.11 ENCOUNTER FOR SCREENING COLONOSCOPY: Primary | ICD-10-CM

## 2021-01-04 LAB
COPATH REPORT: ABNORMAL
PAP: ABNORMAL

## 2021-01-04 NOTE — TELEPHONE ENCOUNTER
will contact patient today to discuss finances for Mammogram and Colonoscopy, patient does not have any health insurance.  Only the HPV has resulted not the Pap.  Lab confirms it is still in process.    Okay to wait for Dr. Ortega to discuss HPV results?      Nani Johns LPN 1/4/2021 9:53 AM

## 2021-01-04 NOTE — TELEPHONE ENCOUNTER
Screening Questions for the Scheduling of Screening Colonoscopies   (If Colonoscopy is diagnostic, Provider should review the chart before scheduling.)  Are you younger than 50 or older than 80?   YES   Do you take aspirin or fish oil?  NO  (if yes, tell patient to stop 1 week prior to Colonoscopy)  Do you take warfarin (Coumadin), clopidogrel (Plavix), apixaban (Eliquis), dabigatram (Pradaxa), rivaroxaban (Xarelto) or any blood thinner? NO   Do you use oxygen at home?  NO   Do you have kidney disease? NO   Are you on dialysis? NO   Have you had a stroke or heart attack in the last year? NO   Have you had a stent in your heart or any blood vessel in the last year? NO   Have you had a transplant of any organ? NO   Have you had a colonoscopy or upper endoscopy (EGD) before? YES          When?  8 YRS AGO   Date of scheduled Colonoscopy. 01/28/2021  Provider ANDREW   Pharmacy [facility]

## 2021-01-05 RX ORDER — POLYETHYLENE GLYCOL 3350, SODIUM CHLORIDE, SODIUM BICARBONATE, POTASSIUM CHLORIDE 420; 11.2; 5.72; 1.48 G/4L; G/4L; G/4L; G/4L
4000 POWDER, FOR SOLUTION ORAL ONCE
Qty: 4000 ML | Refills: 0 | Status: SHIPPED | OUTPATIENT
Start: 2021-01-05 | End: 2021-01-05

## 2021-01-05 RX ORDER — BISACODYL 5 MG/1
TABLET, DELAYED RELEASE ORAL
Qty: 2 TABLET | Refills: 0 | Status: ON HOLD | OUTPATIENT
Start: 2021-01-05 | End: 2021-02-18

## 2021-02-02 ENCOUNTER — OFFICE VISIT (OUTPATIENT)
Dept: OBGYN | Facility: OTHER | Age: 46
End: 2021-02-02
Attending: FAMILY MEDICINE
Payer: COMMERCIAL

## 2021-02-02 VITALS
BODY MASS INDEX: 28.92 KG/M2 | HEART RATE: 68 BPM | SYSTOLIC BLOOD PRESSURE: 108 MMHG | WEIGHT: 188.8 LBS | DIASTOLIC BLOOD PRESSURE: 62 MMHG

## 2021-02-02 DIAGNOSIS — Z01.812 PRE-PROCEDURE LAB EXAM: Primary | ICD-10-CM

## 2021-02-02 DIAGNOSIS — R87.810 ASCUS WITH POSITIVE HIGH RISK HPV CERVICAL: ICD-10-CM

## 2021-02-02 DIAGNOSIS — R87.610 ASCUS WITH POSITIVE HIGH RISK HPV CERVICAL: ICD-10-CM

## 2021-02-02 LAB — HCG UR QL: NEGATIVE

## 2021-02-02 PROCEDURE — 81025 URINE PREGNANCY TEST: CPT | Mod: ZL | Performed by: STUDENT IN AN ORGANIZED HEALTH CARE EDUCATION/TRAINING PROGRAM

## 2021-02-02 PROCEDURE — 57454 BX/CURETT OF CERVIX W/SCOPE: CPT | Performed by: STUDENT IN AN ORGANIZED HEALTH CARE EDUCATION/TRAINING PROGRAM

## 2021-02-02 PROCEDURE — 88305 TISSUE EXAM BY PATHOLOGIST: CPT

## 2021-02-02 ASSESSMENT — PAIN SCALES - GENERAL: PAINLEVEL: NO PAIN (0)

## 2021-02-02 NOTE — NURSING NOTE
Pt presents to clinic today for colposcopy.      Medication Reconciliation: complete  Shirley Camarena LPN

## 2021-02-06 ENCOUNTER — HEALTH MAINTENANCE LETTER (OUTPATIENT)
Age: 46
End: 2021-02-06

## 2021-02-14 ENCOUNTER — ALLIED HEALTH/NURSE VISIT (OUTPATIENT)
Dept: FAMILY MEDICINE | Facility: OTHER | Age: 46
End: 2021-02-14
Attending: SURGERY
Payer: COMMERCIAL

## 2021-02-14 DIAGNOSIS — Z12.11 ENCOUNTER FOR SCREENING COLONOSCOPY: ICD-10-CM

## 2021-02-14 PROCEDURE — C9803 HOPD COVID-19 SPEC COLLECT: HCPCS

## 2021-02-14 PROCEDURE — U0003 INFECTIOUS AGENT DETECTION BY NUCLEIC ACID (DNA OR RNA); SEVERE ACUTE RESPIRATORY SYNDROME CORONAVIRUS 2 (SARS-COV-2) (CORONAVIRUS DISEASE [COVID-19]), AMPLIFIED PROBE TECHNIQUE, MAKING USE OF HIGH THROUGHPUT TECHNOLOGIES AS DESCRIBED BY CMS-2020-01-R: HCPCS | Mod: ZL | Performed by: SURGERY

## 2021-02-14 PROCEDURE — U0005 INFEC AGEN DETEC AMPLI PROBE: HCPCS | Mod: ZL | Performed by: SURGERY

## 2021-02-15 LAB
LABORATORY COMMENT REPORT: NORMAL
SARS-COV-2 RNA RESP QL NAA+PROBE: NEGATIVE
SARS-COV-2 RNA RESP QL NAA+PROBE: NORMAL
SPECIMEN SOURCE: NORMAL
SPECIMEN SOURCE: NORMAL

## 2021-02-16 ENCOUNTER — TELEPHONE (OUTPATIENT)
Dept: FAMILY MEDICINE | Facility: OTHER | Age: 46
End: 2021-02-16

## 2021-02-16 DIAGNOSIS — G43.009 MIGRAINE WITHOUT AURA AND WITHOUT STATUS MIGRAINOSUS, NOT INTRACTABLE: Primary | ICD-10-CM

## 2021-02-17 RX ORDER — BISACODYL 5 MG/1
TABLET, DELAYED RELEASE ORAL
Qty: 2 TABLET | Refills: 0 | Status: ON HOLD | OUTPATIENT
Start: 2021-02-17 | End: 2021-02-18

## 2021-02-17 RX ORDER — POLYETHYLENE GLYCOL 3350 17 G/17G
POWDER, FOR SOLUTION ORAL
Qty: 255 G | Refills: 0 | Status: ON HOLD | OUTPATIENT
Start: 2021-02-17 | End: 2021-02-18

## 2021-02-18 ENCOUNTER — ANESTHESIA EVENT (OUTPATIENT)
Dept: SURGERY | Facility: OTHER | Age: 46
End: 2021-02-18
Payer: COMMERCIAL

## 2021-02-18 ENCOUNTER — HOSPITAL ENCOUNTER (OUTPATIENT)
Facility: OTHER | Age: 46
Discharge: HOME | End: 2021-02-18
Attending: SURGERY | Admitting: SURGERY
Payer: COMMERCIAL

## 2021-02-18 ENCOUNTER — ANESTHESIA (OUTPATIENT)
Dept: SURGERY | Facility: OTHER | Age: 46
End: 2021-02-18
Payer: COMMERCIAL

## 2021-02-18 VITALS
BODY MASS INDEX: 26.52 KG/M2 | SYSTOLIC BLOOD PRESSURE: 123 MMHG | WEIGHT: 175 LBS | RESPIRATION RATE: 12 BRPM | TEMPERATURE: 98.4 F | HEART RATE: 65 BPM | DIASTOLIC BLOOD PRESSURE: 78 MMHG | OXYGEN SATURATION: 99 % | HEIGHT: 68 IN

## 2021-02-18 PROCEDURE — 258N000003 HC RX IP 258 OP 636: Performed by: SURGERY

## 2021-02-18 PROCEDURE — 999N000010 HC STATISTIC ANES STAT CODE-CRNA PER MINUTE: Performed by: SURGERY

## 2021-02-18 PROCEDURE — 45385 COLONOSCOPY W/LESION REMOVAL: CPT | Performed by: NURSE ANESTHETIST, CERTIFIED REGISTERED

## 2021-02-18 PROCEDURE — 250N000011 HC RX IP 250 OP 636: Performed by: NURSE ANESTHETIST, CERTIFIED REGISTERED

## 2021-02-18 PROCEDURE — 46221 LIGATION OF HEMORRHOID(S): CPT | Performed by: SURGERY

## 2021-02-18 PROCEDURE — 46221 LIGATION OF HEMORRHOID(S): CPT

## 2021-02-18 PROCEDURE — 45385 COLONOSCOPY W/LESION REMOVAL: CPT | Performed by: SURGERY

## 2021-02-18 PROCEDURE — 250N000009 HC RX 250: Performed by: SURGERY

## 2021-02-18 PROCEDURE — 250N000009 HC RX 250: Performed by: NURSE ANESTHETIST, CERTIFIED REGISTERED

## 2021-02-18 PROCEDURE — 88305 TISSUE EXAM BY PATHOLOGIST: CPT

## 2021-02-18 PROCEDURE — 45380 COLONOSCOPY AND BIOPSY: CPT | Performed by: SURGERY

## 2021-02-18 RX ORDER — PROPOFOL 10 MG/ML
INJECTION, EMULSION INTRAVENOUS PRN
Status: DISCONTINUED | OUTPATIENT
Start: 2021-02-18 | End: 2021-02-18

## 2021-02-18 RX ORDER — SODIUM CHLORIDE, SODIUM LACTATE, POTASSIUM CHLORIDE, CALCIUM CHLORIDE 600; 310; 30; 20 MG/100ML; MG/100ML; MG/100ML; MG/100ML
INJECTION, SOLUTION INTRAVENOUS CONTINUOUS
Status: DISCONTINUED | OUTPATIENT
Start: 2021-02-18 | End: 2021-02-18 | Stop reason: HOSPADM

## 2021-02-18 RX ORDER — FLUMAZENIL 0.1 MG/ML
0.2 INJECTION, SOLUTION INTRAVENOUS
Status: DISCONTINUED | OUTPATIENT
Start: 2021-02-18 | End: 2021-02-18 | Stop reason: HOSPADM

## 2021-02-18 RX ORDER — LIDOCAINE 40 MG/G
CREAM TOPICAL
Status: DISCONTINUED | OUTPATIENT
Start: 2021-02-18 | End: 2021-02-18 | Stop reason: HOSPADM

## 2021-02-18 RX ORDER — PROPOFOL 10 MG/ML
INJECTION, EMULSION INTRAVENOUS CONTINUOUS PRN
Status: DISCONTINUED | OUTPATIENT
Start: 2021-02-18 | End: 2021-02-18

## 2021-02-18 RX ORDER — NALOXONE HYDROCHLORIDE 0.4 MG/ML
0.2 INJECTION, SOLUTION INTRAMUSCULAR; INTRAVENOUS; SUBCUTANEOUS
Status: DISCONTINUED | OUTPATIENT
Start: 2021-02-18 | End: 2021-02-18 | Stop reason: HOSPADM

## 2021-02-18 RX ORDER — NALOXONE HYDROCHLORIDE 0.4 MG/ML
0.4 INJECTION, SOLUTION INTRAMUSCULAR; INTRAVENOUS; SUBCUTANEOUS
Status: DISCONTINUED | OUTPATIENT
Start: 2021-02-18 | End: 2021-02-18 | Stop reason: HOSPADM

## 2021-02-18 RX ORDER — LIDOCAINE HYDROCHLORIDE 20 MG/ML
INJECTION, SOLUTION INFILTRATION; PERINEURAL PRN
Status: DISCONTINUED | OUTPATIENT
Start: 2021-02-18 | End: 2021-02-18

## 2021-02-18 RX ORDER — TOPIRAMATE 50 MG/1
50 TABLET, FILM COATED ORAL 2 TIMES DAILY
Qty: 60 TABLET | Refills: 0 | Status: SHIPPED | OUTPATIENT
Start: 2021-02-18 | End: 2021-03-15

## 2021-02-18 RX ORDER — ONDANSETRON 2 MG/ML
4 INJECTION INTRAMUSCULAR; INTRAVENOUS
Status: DISCONTINUED | OUTPATIENT
Start: 2021-02-18 | End: 2021-02-18 | Stop reason: HOSPADM

## 2021-02-18 RX ORDER — TOPIRAMATE 50 MG/1
50 TABLET, FILM COATED ORAL 2 TIMES DAILY
Qty: 60 TABLET | Refills: 0 | Status: SHIPPED | OUTPATIENT
Start: 2021-02-18 | End: 2021-02-18

## 2021-02-18 RX ADMIN — PROPOFOL 90 MG: 10 INJECTION, EMULSION INTRAVENOUS at 08:51

## 2021-02-18 RX ADMIN — PROPOFOL 140 MCG/KG/MIN: 10 INJECTION, EMULSION INTRAVENOUS at 08:51

## 2021-02-18 RX ADMIN — PROPOFOL 50 MG: 10 INJECTION, EMULSION INTRAVENOUS at 09:16

## 2021-02-18 RX ADMIN — SODIUM CHLORIDE, POTASSIUM CHLORIDE, SODIUM LACTATE AND CALCIUM CHLORIDE: 600; 310; 30; 20 INJECTION, SOLUTION INTRAVENOUS at 07:59

## 2021-02-18 RX ADMIN — PROPOFOL 40 MG: 10 INJECTION, EMULSION INTRAVENOUS at 09:24

## 2021-02-18 RX ADMIN — PROPOFOL 50 MG: 10 INJECTION, EMULSION INTRAVENOUS at 09:09

## 2021-02-18 RX ADMIN — LIDOCAINE HYDROCHLORIDE 60 MG: 20 INJECTION, SOLUTION INFILTRATION; PERINEURAL at 08:51

## 2021-02-18 ASSESSMENT — LIFESTYLE VARIABLES: TOBACCO_USE: 1

## 2021-02-18 ASSESSMENT — MIFFLIN-ST. JEOR: SCORE: 1487.29

## 2021-02-18 NOTE — ANESTHESIA POSTPROCEDURE EVALUATION
Patient: Tere Camarena    Procedure(s):  COLONOSCOPY, WITH POLYPECTOMY AND BIOPSY, and hemorrhoid banding    Diagnosis:Rectal bleeding [K62.5]  Diagnosis Additional Information: No value filed.    Anesthesia Type:  MAC    Note:  Disposition: Outpatient   Postop Pain Control: Uneventful            Sign Out: Well controlled pain   PONV: No   Neuro/Psych: Uneventful            Sign Out: Acceptable/Baseline neuro status   Airway/Respiratory: Uneventful            Sign Out: Acceptable/Baseline resp. status   CV/Hemodynamics: Uneventful            Sign Out: Acceptable CV status   Other NRE: NONE   DID A NON-ROUTINE EVENT OCCUR? No         Last vitals:  Vitals:    02/18/21 0945 02/18/21 1000 02/18/21 1005   BP: 107/55 120/71 123/78   Pulse: 65 59 65   Resp: 12 12 12   Temp:      SpO2: 98% 98% 99%       Last vitals prior to Anesthesia Care Transfer:  CRNA VITALS  2/18/2021 0900 - 2/18/2021 1000      2/18/2021             Resp Rate (set):  10          Electronically Signed By: WANDA DUKE CRNA  February 18, 2021  10:49 AM

## 2021-02-18 NOTE — OP NOTE
PROCEDURE NOTE    SURGEON:Naeem Camarena MD    PRE-OP DIAGNOSIS:  Recta bleeding      POST-OP DIAGNOSIS: , hemorrhoids, possible colitis    PROCEDURE:Colonoscopy with cold snare and anoscopy with hemorrhoid banding    SPECIMEN:      ID Type Source Tests Collected by Time Destination   A : Sigmoid colon polyps Polyp Large Intestine, Sigmoid SURGICAL PATHOLOGY EXAM Naeem Camarena MD 2/18/2021  8:58 AM    B :  Biopsy Small Intestine, Ileum SURGICAL PATHOLOGY EXAM Naeem Camarena MD 2/18/2021  9:14 AM    C : Random colon bx's Biopsy Colon SURGICAL PATHOLOGY EXAM Naeem Camarena MD 2/18/2021  9:14 AM          ANESTHESIA:  Monitor Anesthesia Care CRNA Independent: Jayden Nicole APRN CRNA   Coverage requested    ESTIMATED BLOOD LOSS: none    COMPLICATIONS:  None    INDICATION FOR THE PROCEDURE: The patient is a 45 year old female. The patient presents with blood per rectum. I explained to the patient the risks, benefits and alternatives to screening colonoscopy for evaluating for cancer or polyps. We discussed the risks including bleeding, perforation, potential inability to reach the cecum and the risks of sedation. The patient's questions were answered and the patient wished to proceed. Informed consent paperwork was completed.    PROCEDURE: The patient was taken to the endoscopy suite. Appropriate monitors were attached. The patient was placed in the left lateral decubitus position. Timeout was performed confirming the patient's identity and procedure to be performed.  After appropriate sedation was confirmed, digital rectal exam was performed.  There was normal tone and no gross abnormality was noted.  The lubricated colonoscope was introduced into the anus the colon was insufflated with air. The prep quality was adequate. Under direct visualization the scope was advanced to the cecum.  The ileum was intubated and appeared normal. Random ileal biopsies and random colon biopsies were taken. There was possible  irritation in the descending colon. The mucosa of the colon was inspected while withdrawing the scope. 4 sigmoid polyps were removed with cold snare (2-4 mm). The scope was retroflexed in the rectum and the anorectal junction was inspected.  Hemorrhoids were noted. The scope was returned to a neutral position and the colon was decompressed. The scope was removed. An anoscope was inserted and 2 columns of hemorrhoids were banded. The patient tolerated the procedure with no immediately apparent complication. The patient was taken to recovery in stable condition.    FOLLOW UP: RECOMMEND high fiber diet, will call with pathology results.     Naeem Camarena MD on 2/18/2021 at 9:31 AM

## 2021-02-18 NOTE — TELEPHONE ENCOUNTER
She should be taking this medication as 50 mg twice daily.  I will refill it at this dose for 30 days.  She needs a follow-up for any further refills.

## 2021-02-18 NOTE — ANESTHESIA PREPROCEDURE EVALUATION
Anesthesia Pre-Procedure Evaluation    Patient: Tere Camarena   MRN: [id number] : 1975        Preoperative Diagnosis: Rectal bleeding [K62.5]   Procedure : Procedure(s):  COLONOSCOPY     No past medical history on file.   Past Surgical History:   Procedure Laterality Date     ABDOMEN SURGERY      hernia surgery X5     BIOPSY BREAST NEEDLE LOCALIZATION Left 2017    Procedure: BIOPSY BREAST NEEDLE LOCALIZATION;  Surgeon: Sita Rushing MD;  Location: [facility]     BREAST SURGERY Left 2016    biopsy     BREAST SURGERY Left 2016    biopsy     GYN SURGERY  2004    tubal ligation     ORTHOPEDIC SURGERY Left [id number]    left knee medial retinacular repair, VMO advancement, and reapir of partial tear of quadriceps tendon     ORTHOPEDIC SURGERY Left 2017    left knee manipulation      Allergies   Allergen Reactions     Peanuts [Peanut-Derived]      Eyes swell     Trees Other (See Comments)     Tree sap makes her eyes swell shut      Social History     Tobacco Use     Smoking status: Current Every Day Smoker     Packs/day: 1.00     Years: 20.00     Pack years: 20.00     Types: Cigarettes     Smokeless tobacco: Never Used   Substance Use Topics     Alcohol use: Yes     Alcohol/week: 5.0 standard drinks     Types: 5 Cans of beer per week     Frequency: 2-4 times a month     Drinks per session: 1 or 2     Binge frequency: Never     Comment:  weekly       Wt Readings from Last 1 Encounters:   21 85.6 kg (188 lb 12.8 oz)        Anesthesia Evaluation   Pt has had prior anesthetic.         ROS/MED HX  ENT/Pulmonary:     (+) tobacco use, Current use, 1 packs/day,     Neurologic:     (+) migraines,     Cardiovascular: Comment: Has a hx of palpitations. EKG shows SB, did not get her Holter monitor yet due to insurance issues      METS/Exercise Tolerance: 4 - Raking leaves, gardening    Hematologic:  - neg hematologic  ROS     Musculoskeletal:  - neg musculoskeletal ROS     GI/Hepatic: Comment: Blood  in stool      Renal/Genitourinary:  - neg Renal ROS     Endo:  - neg endo ROS     Psychiatric/Substance Use:  - neg psychiatric ROS     Infectious Disease:  - neg infectious disease ROS     Malignancy:  - neg malignancy ROS     Other:            Physical Exam    Airway        Mallampati: II   TM distance: > 3 FB   Neck ROM: full   Mouth opening: > 3 cm    Respiratory Devices and Support         Dental     Comment: Poor condition        Cardiovascular          Rhythm and rate: normal     Pulmonary   pulmonary exam normal                OUTSIDE LABS:  CBC:   Lab Results   Component Value Date    WBC 9.6 12/09/2020    WBC 5.8 04/25/2017    HGB 15.2 12/09/2020    HGB 12.9 04/25/2017    HCT 44.0 12/09/2020    HCT 37.9 04/25/2017     12/09/2020     04/25/2017     BMP:   Lab Results   Component Value Date     12/09/2020     05/04/2017    POTASSIUM 3.8 12/09/2020    POTASSIUM 4.2 05/04/2017    CHLORIDE 104 12/09/2020    CHLORIDE 106 05/04/2017    CO2 23 12/09/2020    CO2 24 05/04/2017    BUN 8 12/09/2020    BUN 13 05/04/2017    CR 0.65 12/09/2020    CR 0.72 05/04/2017    GLC 92 12/09/2020    GLC 88 05/04/2017     COAGS:   Lab Results   Component Value Date    PTT 30 11/11/2015    INR 0.90 11/11/2015     POC:   Lab Results   Component Value Date    HCG Negative 02/02/2021     HEPATIC:   Lab Results   Component Value Date    ALBUMIN 4.0 12/09/2020    PROTTOTAL 6.7 12/09/2020    ALT 19 12/09/2020    AST 21 12/09/2020    ALKPHOS 53 12/09/2020    BILITOTAL 0.6 12/09/2020     OTHER:   Lab Results   Component Value Date    A1C 4.9 04/08/2016    JEWELS 8.6 12/09/2020    MAG 1.7 11/11/2015    LIPASE 131 11/11/2015    TSH 3.17 04/25/2017    T4 0.97 04/25/2017    CRP <2.9 04/08/2016    SED 6 11/11/2015       Anesthesia Plan    ASA Status:  2   NPO Status:  NPO Appropriate    Anesthesia Type: MAC.              Consents    Anesthesia Plan(s) and associated risks, benefits, and realistic alternatives discussed.  Questions answered and patient/representative(s) expressed understanding.     - Discussed with:  Patient      - Extended Intubation/Ventilatory Support Discussed: no Extended Intubation.      - Patient is DNR/DNI Status: No    Use of blood products discussed: No .     Postoperative Care            Comments:                WANDA DUKE CRNA

## 2021-02-18 NOTE — TELEPHONE ENCOUNTER
02/18/21 1244 Sign Stephany Ortega, DO Reorder from Order:[id number]     topiramate (TOPAMAX) 50 MG tablet 60 tablet 0 2/18/2021  No   Sig - Route: Take 1 tablet (50 mg) by mouth 2 times daily - Oral   Sent to pharmacy as: Topiramate 50 MG Oral Tablet (TOPAMAX)   Class: E-Prescribe   Order: [id number]   E-Prescribing Status: Transmission to pharmacy failed (2/18/2021 12:45 PM CST)     [facility] [address] prescription sent to Pt, per above written order.     E-Prescribing Status: Receipt confirmed by pharmacy (2/18/2021  3:04 PM CST)    Karina Barajas RN .............. 2/18/2021  3:04 PM

## 2021-02-18 NOTE — TELEPHONE ENCOUNTER
Notified patient of providers note.  Brandi Najera LPN ....................  2/18/2021   4:49 PM

## 2021-02-18 NOTE — OR NURSING
Discharge instructions given to patient and patient's spouse. No questions. Ambulated out of unit. Denies pain, nausea or dizziness    
Strong peripheral pulses/Capillary refill less/equal to 2 seconds

## 2021-02-18 NOTE — ANESTHESIA CARE TRANSFER NOTE
Patient: Tere Camarena    Procedure(s):  COLONOSCOPY, WITH POLYPECTOMY AND BIOPSY, and hemorrhoid banding    Diagnosis: Rectal bleeding [K62.5]  Diagnosis Additional Information: No value filed.    Anesthesia Type:   MAC     Note:    Oropharynx: oropharynx clear of all foreign objects  Level of Consciousness: drowsy      Independent Airway: airway patency satisfactory and stable  Dentition: dentition unchanged  Vital Signs Stable: post-procedure vital signs reviewed and stable  Report to RN Given: handoff report given  Patient transferred to: Phase II    Handoff Report: Identifed the Patient, Identified the Reponsible Provider, Reviewed the pertinent medical history, Discussed the surgical course, Reviewed Intra-OP anesthesia mangement and issues during anesthesia, Set expectations for post-procedure period and Allowed opportunity for questions and acknowledgement of understanding      Vitals: (Last set prior to Anesthesia Care Transfer)  CRNA VITALS  2/18/2021 0900 - 2/18/2021 0931      2/18/2021             Resp Rate (set):  10        Electronically Signed By: WANDA Guillory CRNA  February 18, 2021  9:31 AM

## 2021-02-18 NOTE — DISCHARGE INSTRUCTIONS
Your doctor recommends that you eat 25 to 30 Grams of fiber daily. The following are some examples of fiber amounts in different foods.    Fruits: Apple (with skin) 1 medium = 4.4 Grams   Banana      1 medium = 3.1 Grams   Oranges     1 orange = 3.1 Grams   Prunes     1 cup, pitted = 12.4 Grams    Juices: Apple, unsweetened w/ added ascorbic acid  1 cup = 0.5 Grams    Grapefruit, white, canned,sweetened  1 cup = 0.2 Grams    Grape, unsweetened w/ added ascorbic acid  1 cup = 0.5 Grams    Orange     1 cup = 0.7 Grams    Vegetables:   Cooked: Green Beans   1 cup = 4.0 Grams       Carrots   1/2 cup sliced = 2.3 Grams       Peas       1 cup = 8.8 Grams       Potato (baked, with skin)  1 medium = 3.8 Grams    Raw: Cucumber (with peel)  1 cucumber = 1.5 Grams            Lettuce     1 cup shredded = 0.5 Grams            Tomato   1 medium tomato = 1.5 Grams            Spinach  1 cup = 0.7 Grams    Legumes: Baked beans, canned, no salt added  1 cup = 13.9 Grams         Kidney Beans, canned  1 cup = 13.6 Grams         Soler Beans, canned     1 cup = 11.6 Grams         Lentils, boiled   1 cup = 15.6 Grams    Breads, Pastas, Flours: Bran muffins   1 medium muffin = 5.2 Grams           Oatmeal, cooked  1 cup = 4.0 Grams           White Bread   1 slice = 0.6 Grams           Whole- wheat bread = 1.9 Grams    Pasta and rice, cooked: Macaroni  1 cup = 2.5 Grams           Rice, Brown  1 cup = 3.5 Grams           Rice, white   1 cup = 0.6 Grams           Spaghetti (regular) 1 cup = 2.5 Grams    Nuts: Almonds   1 cup = 17.4 Grams            Peanuts    1 cup = 12.4 Grams       [facility]  Adult Discharge Orders & Instructions    ________________________________________________________________          For 12 hours after surgery  1. Get plenty of rest.  A responsible adult must stay with you for at least 12 hours after you leave the hospital.   2. You may feel lightheaded.  IF so, sit for a few minutes before standing.   Have someone help you get up.   3. You may have a slight fever. Call the doctor if your fever is over 101 F (38.3 C) (taken under the tongue) or lasts longer than 24 hours.  4. You may have a dry mouth, a sore throat, muscle aches or trouble sleeping.  These should go away after 24 hours.  5. Do not make important or legal decisions.  6.   Do not drive or use heavy equipment.  If you have weakness or tingling, don't drive or use heavy equipment until this feeling goes away.    To contact a doctor, call   [phone]_______________________

## 2021-03-01 ENCOUNTER — MYC MEDICAL ADVICE (OUTPATIENT)
Dept: FAMILY MEDICINE | Facility: OTHER | Age: 46
End: 2021-03-01

## 2021-03-09 ENCOUNTER — HOSPITAL ENCOUNTER (OUTPATIENT)
Dept: MAMMOGRAPHY | Facility: OTHER | Age: 46
End: 2021-03-09
Attending: FAMILY MEDICINE
Payer: COMMERCIAL

## 2021-03-09 DIAGNOSIS — R00.2 PALPITATIONS: Primary | ICD-10-CM

## 2021-03-09 DIAGNOSIS — Z12.31 ENCOUNTER FOR SCREENING MAMMOGRAM FOR BREAST CANCER: ICD-10-CM

## 2021-03-09 PROCEDURE — 77063 BREAST TOMOSYNTHESIS BI: CPT

## 2021-03-15 ENCOUNTER — TELEPHONE (OUTPATIENT)
Dept: FAMILY MEDICINE | Facility: OTHER | Age: 46
End: 2021-03-15

## 2021-03-15 ENCOUNTER — HOSPITAL ENCOUNTER (OUTPATIENT)
Dept: CARDIOLOGY | Facility: OTHER | Age: 46
End: 2021-03-15
Attending: FAMILY MEDICINE
Payer: COMMERCIAL

## 2021-03-15 ENCOUNTER — HOSPITAL ENCOUNTER (OUTPATIENT)
Dept: RESPIRATORY THERAPY | Facility: OTHER | Age: 46
End: 2021-03-15
Payer: COMMERCIAL

## 2021-03-15 DIAGNOSIS — R00.2 PALPITATIONS: Primary | ICD-10-CM

## 2021-03-15 DIAGNOSIS — R00.2 PALPITATIONS: ICD-10-CM

## 2021-03-15 DIAGNOSIS — G43.009 MIGRAINE WITHOUT AURA AND WITHOUT STATUS MIGRAINOSUS, NOT INTRACTABLE: Primary | ICD-10-CM

## 2021-03-15 PROCEDURE — 93225 XTRNL ECG REC<48 HRS REC: CPT

## 2021-03-15 PROCEDURE — 999N000157 HC STATISTIC RCP TIME EA 10 MIN

## 2021-03-15 PROCEDURE — 93227 XTRNL ECG REC<48 HR R&I: CPT | Performed by: INTERNAL MEDICINE

## 2021-03-15 RX ORDER — TOPIRAMATE 50 MG/1
50 TABLET, FILM COATED ORAL 2 TIMES DAILY
Qty: 60 TABLET | Refills: 0 | Status: SHIPPED | OUTPATIENT
Start: 2021-03-15 | End: 2022-01-10

## 2021-03-15 NOTE — TELEPHONE ENCOUNTER
After verifying patient's name and date of birth, patient was given the below information.  Norma J. Gosselin, LPN....3/15/2021 2:34 PM

## 2021-03-15 NOTE — TELEPHONE ENCOUNTER
PLEASE CALL PATIENT WITH REFILL STATUS!    Per chart review, Pt is only on 1 medication, and it was previously sent to [facility]:    topiramate (TOPAMAX) 50 MG tablet 60 tablet 0 2/18/2021  No   Sig - Route: Take 1 tablet (50 mg) by mouth 2 times daily - Oral     LOV: 12/23/20    Next 5 appointments (look out 90 days)    Mar 22, 2021  2:00 PM  MyChart Long with Stephany Ortega DO  [facility] ([facility] ) [address]  [place] [address]  [phone]        Routing to covering provider, in the absence of Dr. Ortega. She is scheduled to return Monday 3/22/21.    Unable to complete prescription refill per RN Medication Refill Policy. Karina Barajas RN .............. 3/15/2021  12:43 PM

## 2021-03-15 NOTE — TELEPHONE ENCOUNTER
PCP is gone and will need her medication or enough to last until her appt on 22nd she will be out on the 19th would like refill nurse to call

## 2021-03-15 NOTE — PATIENT INSTRUCTIONS
Date Place on Pt:  3/15/21  Patient instructed to:   -wear device for 48 hours   -documents cardiac symptoms, activities and medication taken in the patient  diary   -not to take a bath, shower, swim or sauna   -not use electric blanket   -remove device at end of the test period and return to the diagnostics desk

## 2021-03-18 ENCOUNTER — HOSPITAL ENCOUNTER (OUTPATIENT)
Dept: MAMMOGRAPHY | Facility: OTHER | Age: 46
End: 2021-03-18
Attending: FAMILY MEDICINE
Payer: COMMERCIAL

## 2021-03-18 DIAGNOSIS — R92.8 ABNORMAL FINDING ON BREAST IMAGING: ICD-10-CM

## 2021-03-18 PROCEDURE — G0279 TOMOSYNTHESIS, MAMMO: HCPCS

## 2021-06-10 ENCOUNTER — E-VISIT (OUTPATIENT)
Dept: FAMILY MEDICINE | Facility: OTHER | Age: 46
End: 2021-06-10
Attending: FAMILY MEDICINE
Payer: COMMERCIAL

## 2021-06-10 DIAGNOSIS — M79.10 MYALGIA: Primary | ICD-10-CM

## 2021-06-11 ENCOUNTER — ALLIED HEALTH/NURSE VISIT (OUTPATIENT)
Dept: FAMILY MEDICINE | Facility: OTHER | Age: 46
End: 2021-06-11
Attending: FAMILY MEDICINE
Payer: COMMERCIAL

## 2021-06-11 DIAGNOSIS — R06.02 SOB (SHORTNESS OF BREATH): Primary | ICD-10-CM

## 2021-06-11 DIAGNOSIS — M79.10 MYALGIA: Primary | ICD-10-CM

## 2021-06-11 LAB
SARS-COV-2 RNA RESP QL NAA+PROBE: NORMAL
SPECIMEN SOURCE: NORMAL

## 2021-06-11 PROCEDURE — U0005 INFEC AGEN DETEC AMPLI PROBE: HCPCS | Mod: ZL | Performed by: FAMILY MEDICINE

## 2021-06-11 PROCEDURE — U0003 INFECTIOUS AGENT DETECTION BY NUCLEIC ACID (DNA OR RNA); SEVERE ACUTE RESPIRATORY SYNDROME CORONAVIRUS 2 (SARS-COV-2) (CORONAVIRUS DISEASE [COVID-19]), AMPLIFIED PROBE TECHNIQUE, MAKING USE OF HIGH THROUGHPUT TECHNOLOGIES AS DESCRIBED BY CMS-2020-01-R: HCPCS | Mod: ZL | Performed by: FAMILY MEDICINE

## 2021-06-11 PROCEDURE — C9803 HOPD COVID-19 SPEC COLLECT: HCPCS

## 2021-06-12 LAB
LABORATORY COMMENT REPORT: NORMAL
SARS-COV-2 RNA RESP QL NAA+PROBE: NEGATIVE
SPECIMEN SOURCE: NORMAL

## 2021-09-12 ENCOUNTER — HEALTH MAINTENANCE LETTER (OUTPATIENT)
Age: 46
End: 2021-09-12

## 2021-10-22 ENCOUNTER — ALLIED HEALTH/NURSE VISIT (OUTPATIENT)
Dept: FAMILY MEDICINE | Facility: OTHER | Age: 46
End: 2021-10-22
Attending: FAMILY MEDICINE
Payer: COMMERCIAL

## 2021-10-22 DIAGNOSIS — R05.9 COUGH: Primary | ICD-10-CM

## 2021-10-22 DIAGNOSIS — J02.0 STREPTOCOCCAL SORE THROAT: ICD-10-CM

## 2021-10-22 PROCEDURE — U0003 INFECTIOUS AGENT DETECTION BY NUCLEIC ACID (DNA OR RNA); SEVERE ACUTE RESPIRATORY SYNDROME CORONAVIRUS 2 (SARS-COV-2) (CORONAVIRUS DISEASE [COVID-19]), AMPLIFIED PROBE TECHNIQUE, MAKING USE OF HIGH THROUGHPUT TECHNOLOGIES AS DESCRIBED BY CMS-2020-01-R: HCPCS | Mod: ZL

## 2021-10-22 PROCEDURE — C9803 HOPD COVID-19 SPEC COLLECT: HCPCS

## 2021-10-24 LAB — SARS-COV-2 RNA RESP QL NAA+PROBE: NEGATIVE

## 2021-11-02 NOTE — TELEPHONE ENCOUNTER
[id number] sent Rx request for the following:      Requested Prescriptions   Pending Prescriptions Disp Refills   topiramate (TOPAMAX) 25 MG tablet [Pharmacy Med Name: Topiramate 25 MG Oral Tablet] 21 tablet 0    Sig: TAKE 1 TABLET BY MOUTH IN THE MORNING AND 2 TABLETS  AT BEDTIME FOR 7 DAYS   Last Prescription Date:   1/14/21  Last Fill Qty/Refills:         60, R-1    Last Office Visit:              12/23/20 (physical)  Future Office visit:           None  Routing refill request to provider for review/approval because:   Anti-Seizure Meds Protocol  Failed - 2/18/2021 10:02 AM       Failed - Review Authorizing provider's last note.      Unable to complete prescription refill per RN Medication Refill Policy. Karina Barajas RN .............. 2/18/2021  10:07 AM       Transition of Care Plan:     RUR: 7% low risk  Disposition: Home with spouse  Follow up appointments: PCP  DME needed: Home 02 through [facility] waiting for portable tank to be delivered  Transportation at Discharge: Pt has vehicle at hospital, will transport self home. [place] or means to access home: Pt has access      [place] Medicare Letter: N/A - commercial insurance   Is patient a BCPI-A Bundle: No                    If yes, was Bundle Letter given?:     Caregiver Contact: Pt's spouse, Nasreen Washington) [phone]  Discharge Caregiver contacted prior to discharge?   Unit CM to follow.         CM acknowledged d/c orders. Pt will d/c home with family assistance and home 02 through [facility]. CM contacted DME company and they will update CM with ETA of portable tank delivery. CM scheduled PCP f/u appt and updated pt's AVS. Pt's spouse to transport him home at d/c. Care Management Interventions  PCP Verified by CM: Yes  Palliative Care Criteria Met (RRAT>21 & CHF Dx)?: No  Mode of Transport at Discharge:  Other (see comment) (pt's wife)  Transition of Care Consult (CM Consult): Discharge Planning, DME/Supply Assistance  Discharge Durable Medical Equipment: Yes (Home 02)  Physical Therapy Consult: No  Occupational Therapy Consult: No  Speech Therapy Consult: No  Support Systems: Spouse/Significant Other  Confirm Follow Up Transport: Family  Discharge Location  Discharge Placement: Home with family assistance    TANI Landa  Care Manager [facility]  [phone]

## 2022-01-04 ENCOUNTER — ALLIED HEALTH/NURSE VISIT (OUTPATIENT)
Dept: FAMILY MEDICINE | Facility: OTHER | Age: 47
End: 2022-01-04
Attending: FAMILY MEDICINE
Payer: COMMERCIAL

## 2022-01-04 DIAGNOSIS — R52 BODY ACHES: ICD-10-CM

## 2022-01-04 DIAGNOSIS — R09.89 CHEST CONGESTION: ICD-10-CM

## 2022-01-04 DIAGNOSIS — Z20.822 COVID-19 RULED OUT: Primary | ICD-10-CM

## 2022-01-04 DIAGNOSIS — R51.9 HEAD ACHE: ICD-10-CM

## 2022-01-04 DIAGNOSIS — R50.9 FEVER, UNSPECIFIED: ICD-10-CM

## 2022-01-04 PROCEDURE — C9803 HOPD COVID-19 SPEC COLLECT: HCPCS

## 2022-01-04 PROCEDURE — U0003 INFECTIOUS AGENT DETECTION BY NUCLEIC ACID (DNA OR RNA); SEVERE ACUTE RESPIRATORY SYNDROME CORONAVIRUS 2 (SARS-COV-2) (CORONAVIRUS DISEASE [COVID-19]), AMPLIFIED PROBE TECHNIQUE, MAKING USE OF HIGH THROUGHPUT TECHNOLOGIES AS DESCRIBED BY CMS-2020-01-R: HCPCS | Mod: ZL

## 2022-01-04 NOTE — PROGRESS NOTES
Patient swabbed for COVID-19 testing.  Fever body aches headache congestion  Puja Haynes MA on 1/4/2022 at 8:52 AM

## 2022-01-06 LAB — SARS-COV-2 RNA RESP QL NAA+PROBE: POSITIVE

## 2022-01-10 ENCOUNTER — VIRTUAL VISIT (OUTPATIENT)
Dept: FAMILY MEDICINE | Facility: OTHER | Age: 47
End: 2022-01-10
Attending: PHYSICIAN ASSISTANT
Payer: COMMERCIAL

## 2022-01-10 VITALS
SYSTOLIC BLOOD PRESSURE: 116 MMHG | DIASTOLIC BLOOD PRESSURE: 62 MMHG | BODY MASS INDEX: 26.37 KG/M2 | RESPIRATION RATE: 18 BRPM | WEIGHT: 173.4 LBS | TEMPERATURE: 98.7 F | HEART RATE: 75 BPM | OXYGEN SATURATION: 97 %

## 2022-01-10 VITALS — WEIGHT: 175 LBS | BODY MASS INDEX: 26.52 KG/M2 | HEIGHT: 68 IN

## 2022-01-10 DIAGNOSIS — H57.89 EYE DRAINAGE: ICD-10-CM

## 2022-01-10 DIAGNOSIS — U07.1 INFECTION DUE TO 2019 NOVEL CORONAVIRUS: Primary | ICD-10-CM

## 2022-01-10 DIAGNOSIS — K27.9 PUD (PEPTIC ULCER DISEASE): ICD-10-CM

## 2022-01-10 DIAGNOSIS — K92.1 BLACK STOOLS: ICD-10-CM

## 2022-01-10 DIAGNOSIS — E87.1 HYPONATREMIA: ICD-10-CM

## 2022-01-10 DIAGNOSIS — J01.00 ACUTE NON-RECURRENT MAXILLARY SINUSITIS: ICD-10-CM

## 2022-01-10 DIAGNOSIS — H65.93 OME (OTITIS MEDIA WITH EFFUSION), BILATERAL: ICD-10-CM

## 2022-01-10 DIAGNOSIS — H92.02 OTALGIA, LEFT: ICD-10-CM

## 2022-01-10 DIAGNOSIS — H10.32 ACUTE BACTERIAL CONJUNCTIVITIS OF LEFT EYE: ICD-10-CM

## 2022-01-10 DIAGNOSIS — H92.02 LEFT EAR PAIN: ICD-10-CM

## 2022-01-10 DIAGNOSIS — J34.89 SINUS PRESSURE: ICD-10-CM

## 2022-01-10 DIAGNOSIS — R10.13 EPIGASTRIC PAIN: ICD-10-CM

## 2022-01-10 LAB
ALBUMIN SERPL-MCNC: 4.2 G/DL (ref 3.5–5.7)
ALP SERPL-CCNC: 111 U/L (ref 34–104)
ALT SERPL W P-5'-P-CCNC: 40 U/L (ref 7–52)
ANION GAP SERPL CALCULATED.3IONS-SCNC: 11 MMOL/L (ref 3–14)
AST SERPL W P-5'-P-CCNC: 50 U/L (ref 13–39)
BASOPHILS # BLD AUTO: 0 10E3/UL (ref 0–0.2)
BASOPHILS NFR BLD AUTO: 0 %
BILIRUB SERPL-MCNC: 1.1 MG/DL (ref 0.3–1)
BUN SERPL-MCNC: 7 MG/DL (ref 7–25)
CALCIUM SERPL-MCNC: 9.2 MG/DL (ref 8.6–10.3)
CHLORIDE BLD-SCNC: 100 MMOL/L (ref 98–107)
CO2 SERPL-SCNC: 26 MMOL/L (ref 21–31)
CREAT SERPL-MCNC: 0.54 MG/DL (ref 0.6–1.2)
EOSINOPHIL # BLD AUTO: 0 10E3/UL (ref 0–0.7)
EOSINOPHIL NFR BLD AUTO: 0 %
ERYTHROCYTE [DISTWIDTH] IN BLOOD BY AUTOMATED COUNT: 13.2 % (ref 10–15)
GFR SERPL CREATININE-BSD FRML MDRD: >90 ML/MIN/1.73M2
GLUCOSE BLD-MCNC: 82 MG/DL (ref 70–105)
HCT VFR BLD AUTO: 45.3 % (ref 35–47)
HGB BLD-MCNC: 16.6 G/DL (ref 11.7–15.7)
IMM GRANULOCYTES # BLD: 0.1 10E3/UL
IMM GRANULOCYTES NFR BLD: 1 %
LYMPHOCYTES # BLD AUTO: 2 10E3/UL (ref 0.8–5.3)
LYMPHOCYTES NFR BLD AUTO: 15 %
MCH RBC QN AUTO: 36.9 PG (ref 26.5–33)
MCHC RBC AUTO-ENTMCNC: 36.6 G/DL (ref 31.5–36.5)
MCV RBC AUTO: 101 FL (ref 78–100)
MONOCYTES # BLD AUTO: 0.9 10E3/UL (ref 0–1.3)
MONOCYTES NFR BLD AUTO: 7 %
NEUTROPHILS # BLD AUTO: 10.1 10E3/UL (ref 1.6–8.3)
NEUTROPHILS NFR BLD AUTO: 77 %
NRBC # BLD AUTO: 0 10E3/UL
NRBC BLD AUTO-RTO: 0 /100
PLATELET # BLD AUTO: 224 10E3/UL (ref 150–450)
POTASSIUM BLD-SCNC: 2.9 MMOL/L (ref 3.5–5.1)
PROT SERPL-MCNC: 6.8 G/DL (ref 6.4–8.9)
RBC # BLD AUTO: 4.5 10E6/UL (ref 3.8–5.2)
SODIUM SERPL-SCNC: 137 MMOL/L (ref 134–144)
WBC # BLD AUTO: 13 10E3/UL (ref 4–11)

## 2022-01-10 PROCEDURE — G0463 HOSPITAL OUTPT CLINIC VISIT: HCPCS

## 2022-01-10 PROCEDURE — 36415 COLL VENOUS BLD VENIPUNCTURE: CPT | Mod: ZL | Performed by: NURSE PRACTITIONER

## 2022-01-10 PROCEDURE — 80053 COMPREHEN METABOLIC PANEL: CPT | Mod: ZL | Performed by: NURSE PRACTITIONER

## 2022-01-10 PROCEDURE — 85025 COMPLETE CBC W/AUTO DIFF WBC: CPT | Mod: ZL | Performed by: NURSE PRACTITIONER

## 2022-01-10 PROCEDURE — 99215 OFFICE O/P EST HI 40 MIN: CPT | Performed by: NURSE PRACTITIONER

## 2022-01-10 RX ORDER — OFLOXACIN 3 MG/ML
1-2 SOLUTION/ DROPS OPHTHALMIC 4 TIMES DAILY
Qty: 5 ML | Refills: 0 | Status: SHIPPED | OUTPATIENT
Start: 2022-01-10 | End: 2022-08-16

## 2022-01-10 RX ORDER — POTASSIUM CHLORIDE 750 MG/1
20 TABLET, EXTENDED RELEASE ORAL 2 TIMES DAILY
Qty: 120 TABLET | Refills: 0 | Status: SHIPPED | OUTPATIENT
Start: 2022-01-10 | End: 2022-02-09

## 2022-01-10 RX ORDER — OMEPRAZOLE 40 MG/1
40 CAPSULE, DELAYED RELEASE ORAL 2 TIMES DAILY
Qty: 120 CAPSULE | Refills: 0 | Status: SHIPPED | OUTPATIENT
Start: 2022-01-10 | End: 2022-01-20

## 2022-01-10 ASSESSMENT — PAIN SCALES - GENERAL: PAINLEVEL: MODERATE PAIN (5)

## 2022-01-10 ASSESSMENT — ANXIETY QUESTIONNAIRES
6. BECOMING EASILY ANNOYED OR IRRITABLE: NOT AT ALL
1. FEELING NERVOUS, ANXIOUS, OR ON EDGE: NOT AT ALL
3. WORRYING TOO MUCH ABOUT DIFFERENT THINGS: NOT AT ALL
7. FEELING AFRAID AS IF SOMETHING AWFUL MIGHT HAPPEN: NOT AT ALL
2. NOT BEING ABLE TO STOP OR CONTROL WORRYING: NOT AT ALL
IF YOU CHECKED OFF ANY PROBLEMS ON THIS QUESTIONNAIRE, HOW DIFFICULT HAVE THESE PROBLEMS MADE IT FOR YOU TO DO YOUR WORK, TAKE CARE OF THINGS AT HOME, OR GET ALONG WITH OTHER PEOPLE: NOT DIFFICULT AT ALL
5. BEING SO RESTLESS THAT IT IS HARD TO SIT STILL: NOT AT ALL
GAD7 TOTAL SCORE: 0

## 2022-01-10 ASSESSMENT — PATIENT HEALTH QUESTIONNAIRE - PHQ9: 5. POOR APPETITE OR OVEREATING: NOT AT ALL

## 2022-01-10 ASSESSMENT — MIFFLIN-ST. JEOR: SCORE: 1482.29

## 2022-01-10 NOTE — NURSING NOTE
"Chief Complaint   Patient presents with     Nasal Congestion     Ear Problem     Patient is here for congestion, her left ear feeling plugged and watery eyes that started 12/30/2021. Patient states her symptoms got worse in the last 2 days.     Initial /62   Pulse 75   Temp 98.7  F (37.1  C) (Tympanic)   Resp 18   Wt 78.7 kg (173 lb 6.4 oz)   LMP 12/27/2021   SpO2 97%   BMI 26.37 kg/m   Estimated body mass index is 26.37 kg/m  as calculated from the following:    Height as of an earlier encounter on 1/10/22: 1.727 m (5' 8\").    Weight as of this encounter: 78.7 kg (173 lb 6.4 oz).  Medication Reconciliation: complete    Tamela Nguyen LPN  "

## 2022-01-10 NOTE — PROGRESS NOTES
ASSESSMENT/PLAN:     I have reviewed the nursing notes.  I have reviewed the findings, diagnosis, plan and need for follow up with the patient.    1. Infection due to 2019 novel coronavirus    - CBC and Differential  - Comprehensive Metabolic Panel    Tested positive for Covid on 1/4/22 - 6 days ago with worsening symptoms.      CBC with elevated WBC of 13.0 indicative of secondary bacterial infection    Continue symptomatic treatment:  Symptomatic treatment - Encouraged fluids, salt water gargles, honey, elevation, humidifier, sinus rinse/netti pot, lozenges, tea, topical vapor rub, popsicles, rest, etc     May use over-the-counter Tylenol PRN    Discussed warning signs/symptoms indicative of need to f/u  Follow up if symptoms persist or worsen or concerns    2. Left ear pain    Symptoms of severe left ear pain with decreased hearing and bloody drainage.  No noted ear infection or perforation on exam.    3. Sinus pressure    Symptomatic treatment - saline nasal spray, netti pot, steamy shower, sleep elevated, humidifier, etc  May use OTC sinus decongestant or expectorant and tylenol, etc     4. Black stools    - CBC and Differential  - Comprehensive Metabolic Panel    CBC - normal hemoglobin of 16.6, hematocrit of 45.3 and platelets of 224  No indications of active GI bleed based on labs and no bloody stools or emesis  No NSAIDS  Recommend follow up/recheck in 1 to 2 weeks     5. Epigastric pain    - omeprazole (PRILOSEC) 40 MG DR capsule; Take 1 capsule (40 mg) by mouth 2 times daily  Dispense: 120 capsule; Refill: 0    CBC - normal hemoglobin of 16.6, hematocrit of 45.3 and platelets of 224  No indications of active GI bleed based on labs and no bloody stools or emesis  Likely Peptic ulcer disease with new onset black colored diarrhea.  Start PPI high dose BID.   No NSAIDS  Follow up in 1 to 2 weeks for recheck.      6. OME (otitis media with effusion), bilateral    Symptoms of severe left ear pain with decreased  hearing and bloody drainage (drainage has since resolved).  Bilateral TMs with serous effusion with bulging.  No noted ear infection or perforation on exam.      7. Acute non-recurrent maxillary sinusitis    - amoxicillin-clavulanate (AUGMENTIN) 875-125 MG tablet; Take 1 tablet by mouth 2 times daily for 10 days  Dispense: 20 tablet; Refill: 0    Significant nasal obstruction with thick yellow drainage on exam with significant sinus tenderness.    Symptomatic treatment - saline nasal spray, netti pot, steamy shower, sleep elevated, humidifier, etc  May use OTC sinus decongestant or expectorant and tylenol, etc   May use OTC steroid nasal spray daily PRN    8. Acute bacterial conjunctivitis of left eye    - ofloxacin (OCUFLOX) 0.3 % ophthalmic solution; Place 1-2 drops Into the left eye 4 times daily  Dispense: 5 mL; Refill: 0    Thick yellow drainage from left eye with associated surrounding periorbital irritation  Symptomatic treatment - moist compresses PRN, avoid touching or rubbing eyes, etc     9. PUD (peptic ulcer disease)    - omeprazole (PRILOSEC) 40 MG DR capsule; Take 1 capsule (40 mg) by mouth 2 times daily  Dispense: 120 capsule; Refill: 0    CBC - normal hemoglobin of 16.6, hematocrit of 45.3 and platelets of 224  Likely Peptic ulcer disease with new onset black colored diarrhea.  Start PPI high dose BID.   No indications of active GI bleed based on labs and no bloody stools or emesis  No NSAIDS  Follow up in 1 to 2 weeks for recheck.    10. Hyponatremia    - potassium chloride ER (KLOR-CON M) 10 MEQ CR tablet; Take 2 tablets (20 mEq) by mouth 2 times daily  Dispense: 120 tablet; Refill: 0    Potassium of 2.9 today, remainder of CMP unremarkable   Recheck at follow up visit in 1 to 2 weeks          I explained my diagnostic considerations and recommendations to the patient, who voiced understanding and agreement with the treatment plan. All questions were answered. We discussed potential side effects of  any prescribed or recommended therapies, as well as expectations for response to treatments.    Ximena Johnson NP  [facility]      SUBJECTIVE:   Tere Camarena is a 46 year old female who presents to clinic today for the following health issues:  Ear pain and worsening covid symptoms     HPI  Symptoms started on 12/30/21 with body aches, headaches, fever, fatigue.  Tested positive for Covid on 1/4/22.    Cough and nasal congestion started on 1/5/22.  Minimally productive cough.  No chest tightness or heaviness.  Shortness of breath due to nasal congestion.  [place] pop in left ear with severe pain with decreased hearing and blood in ear 2 days ago.  Left eye with thick yellow drainage starting today.  Sinus pressure and post nasal drainage for the past 5 days and ongoing/worsening.  Vomiting for the past 2 days from phlegm.  Emesis is yellow bile.  Only able to drink water.  Normally drinks Pepsi but is causing stomach upset and pain.   Nausea.  Decreased appetite, no solids yesterday, toast today.   Diarrhea the past 4 days, states black watery stools. No bright red stools or noted bleeding.  States hx of internal hemorrhoids with bleeding.   Upper abdominal pain the past 3 to 4 days.  No pepto bisthmal or antacids.  No heartburn or reflux.  Fevers every other day since the onset of symptoms.  Fever up to 101.2 yesterday.  Frequent sweats.  Sweats today.  Occasional chills.  Intermittent headaches.  Intermittent light headedness.  Loss of taste and smell x 5 days.    Significant other and sibling with illness but improving.  She continues to worsen.    Taking severe cold/flu and ibuprofen  Daily smoker about 1 ppd  Not covid vaccinated       History reviewed. No pertinent past medical history.  Past Surgical History:   Procedure Laterality Date     ABDOMEN SURGERY      hernia surgery X5     BIOPSY BREAST NEEDLE LOCALIZATION Left 1/30/2017    Procedure: BIOPSY BREAST NEEDLE LOCALIZATION;   Surgeon: Sita Rushing MD;  Location: [facility] OR     BREAST SURGERY Left 09/28/2016    biopsy     BREAST SURGERY Left 09/13/2016    biopsy     COLONOSCOPY N/A 2/18/2021    Procedure: COLONOSCOPY, WITH POLYPECTOMY AND BIOPSY, and hemorrhoid banding;  Surgeon: Naeem Camarena MD;  Location: [facility] OR     GYN SURGERY  2004    tubal ligation     ORTHOPEDIC SURGERY Left [id number]    left knee medial retinacular repair, VMO advancement, and reapir of partial tear of quadriceps tendon     ORTHOPEDIC SURGERY Left 04/18/2017    left knee manipulation     Social History     Tobacco Use     Smoking status: Current Every Day Smoker     Packs/day: 1.00     Years: 20.00     Pack years: 20.00     Types: Cigarettes     Smokeless tobacco: Never Used   Substance Use Topics     Alcohol use: Yes     Alcohol/week: 5.0 standard drinks     Types: 5 Cans of beer per week     Comment:  weekly      No current outpatient medications on file.     Allergies   Allergen Reactions     Peanuts [Peanut-Derived]      Eyes swell     Trees Other (See Comments)     Tree sap makes her eyes swell shut         Past medical history, past surgical history, current medications and allergies reviewed and accurate to the best of my knowledge.        OBJECTIVE:     /62   Pulse 75   Temp 98.7  F (37.1  C) (Tympanic)   Resp 18   Wt 78.7 kg (173 lb 6.4 oz)   LMP 12/27/2021   SpO2 97%   BMI 26.37 kg/m    Body mass index is 26.37 kg/m .     Physical Exam  General Appearance: miserable and ill appearing adult female, appropriate appearance for age. No acute distress  Ears: Left TM intact, translucent with bony landmarks appreciated, no noted perforation, drainage or bleeding, no erythema, serous effusion with bulging, no purulence.  Right TM intact, translucent with bony landmarks appreciated, no erythema, serous effusion with bulging, no purulence.  Left auditory canal clear.  Right auditory canal clear.  Normal external ears, non tender.  Eyes: Left  conjunctivae with erythema and irritation, cornea clear, thick drainage and crusting, left eyelid with erythema, mild irritation and minimal swelling.  Right conjunctivae without erythema, irritation, cornea clear, no drainage or crusting, no eyelid swelling or erythema.  Pupils equal.    Orophayrnx: moist mucous membranes, posterior pharynx without erythema, tonsils without hypertrophy, tonsils without erythema, no tonsillar exudates, no oral lesions, no palate petechiae, thick post nasal drip seen, no trismus, voice clear.    Sinuses:  Bilateral sinus tenderness upon palpation of the frontal and maxillary sinuses  Nose: significant nasal swelling and obstruction with erythema and thick yellow drainage/congestion   Neck: supple without adenopathy  Respiratory: normal chest wall and respirations.  Normal effort.  Clear to auscultation bilaterally, no wheezing, crackles or rhonchi.  No increased work of breathing.  Occasional minimally congested cough appreciated.  Cardiac: RRR with no murmurs  Abdomen: soft, localized epigastric tenderness, non-tender upper and lower quadrants, no rigidity, no rebound tenderness or guarding, normal bowel sounds present  Musculoskeletal:  Equal movement of bilateral upper extremities.  Equal movement of bilateral lower extremities.  Normal gait.   Psychological: normal affect, alert, oriented, and pleasant.       Labs:  Results for orders placed or performed in visit on 01/10/22   Comprehensive Metabolic Panel     Status: Abnormal   Result Value Ref Range    Sodium 137 134 - 144 mmol/L    Potassium 2.9 (L) 3.5 - 5.1 mmol/L    Chloride 100 98 - 107 mmol/L    Carbon Dioxide (CO2) 26 21 - 31 mmol/L    Anion Gap 11 3 - 14 mmol/L    Urea Nitrogen 7 7 - 25 mg/dL    Creatinine 0.54 (L) 0.60 - 1.20 mg/dL    Calcium 9.2 8.6 - 10.3 mg/dL    Glucose 82 70 - 105 mg/dL    Alkaline Phosphatase 111 (H) 34 - 104 U/L    AST 50 (H) 13 - 39 U/L    ALT 40 7 - 52 U/L    Protein Total 6.8 6.4 - 8.9 g/dL     Albumin 4.2 3.5 - 5.7 g/dL    Bilirubin Total 1.1 (H) 0.3 - 1.0 mg/dL    GFR Estimate >90 >60 mL/min/1.73m2   CBC with platelets and differential     Status: Abnormal   Result Value Ref Range    WBC Count 13.0 (H) 4.0 - 11.0 10e3/uL    RBC Count 4.50 3.80 - 5.20 10e6/uL    Hemoglobin 16.6 (H) 11.7 - 15.7 g/dL    Hematocrit 45.3 35.0 - 47.0 %     (H) 78 - 100 fL    MCH 36.9 (H) 26.5 - 33.0 pg    MCHC 36.6 (H) 31.5 - 36.5 g/dL    RDW 13.2 10.0 - 15.0 %    Platelet Count 224 150 - 450 10e3/uL    % Neutrophils 77 %    % Lymphocytes 15 %    % Monocytes 7 %    % Eosinophils 0 %    % Basophils 0 %    % Immature Granulocytes 1 %    NRBCs per 100 WBC 0 <1 /100    Absolute Neutrophils 10.1 (H) 1.6 - 8.3 10e3/uL    Absolute Lymphocytes 2.0 0.8 - 5.3 10e3/uL    Absolute Monocytes 0.9 0.0 - 1.3 10e3/uL    Absolute Eosinophils 0.0 0.0 - 0.7 10e3/uL    Absolute Basophils 0.0 0.0 - 0.2 10e3/uL    Absolute Immature Granulocytes 0.1 <=0.4 10e3/uL    Absolute NRBCs 0.0 10e3/uL   CBC and Differential     Status: Abnormal    Narrative    The following orders were created for panel order CBC and Differential.  Procedure                               Abnormality         Status                     ---------                               -----------         ------                     CBC with platelets and d...[[id number]]  Abnormal            Final result                 Please view results for these tests on the individual orders.

## 2022-01-10 NOTE — PROGRESS NOTES
Tere is a 46 year old who is being evaluated via a billable telephone visit.      What phone number would you like to be contacted at? [phone]  [facility]    Assessment & Plan     1. Infection due to 2019 novel coronavirus  Discussed with patient that most of her symptoms are likely related to her known COVID infection however concerns with yellow/puslike drainage from her eye and concerns for possible infection/perforation of left tympanic membrane. Discussed symptomatic management versus being seen in clinic. Patient prefers to be seen in clinic soon as possible. No openings in the clinic for today so patient will present to rapid clinic for additional evaluation.    2. Eye drainage  As above    3. Otalgia, left  As above.       Return if symptoms worsen or fail to improve.    Omayra Sousa PA-C  [facility]    Subjective   Tere is a 46 year old who presents for the following health issues     HPI   Contacted via telephone for discussion regarding note feeling well. Patient tested positive for COVID on 1/4/2022. She has continued to struggle with significant nasal congestion, body aches. She reports she was blowing her nose with such force that she felt a pop in her left ear. She noticed decreased hearing ability and blood on a Q-tip. She also reports today she has significant yellow/pus drainage from her left eye. Minimal on the right side. Her temperature was 101.2F yesterday. She was vomiting yesterday but that has improved. Has not been doing much for symptomatic relief. Has cough, headaches. No associated shortness of breath, wheezing, rash.      PAST MEDICAL HISTORY: History reviewed. No pertinent past medical history.    PAST SURGICAL HISTORY:   Past Surgical History:   Procedure Laterality Date     ABDOMEN SURGERY      hernia surgery X5     BIOPSY BREAST NEEDLE LOCALIZATION Left 1/30/2017    Procedure: BIOPSY BREAST NEEDLE LOCALIZATION;   Surgeon: Sita Rushing MD;  Location: [facility]     BREAST SURGERY Left 09/28/2016    biopsy     BREAST SURGERY Left 09/13/2016    biopsy     COLONOSCOPY N/A 2/18/2021    Procedure: COLONOSCOPY, WITH POLYPECTOMY AND BIOPSY, and hemorrhoid banding;  Surgeon: Naeem Camarena MD;  Location: [facility] OR     GYN SURGERY  2004    tubal ligation     ORTHOPEDIC SURGERY Left [id number]    left knee medial retinacular repair, VMO advancement, and reapir of partial tear of quadriceps tendon     ORTHOPEDIC SURGERY Left 04/18/2017    left knee manipulation       FAMILY HISTORY:   Family History   Problem Relation Age of Onset     Other - See Comments Mother         back surgery     Other Cancer Father         skin cancer     Hypertension Father      Gastrointestinal Disease Father         Diverticulosis       SOCIAL HISTORY:   Social History     Tobacco Use     Smoking status: Current Every Day Smoker     Packs/day: 1.00     Years: 20.00     Pack years: 20.00     Types: Cigarettes     Smokeless tobacco: Never Used   Substance Use Topics     Alcohol use: Yes     Alcohol/week: 5.0 standard drinks     Types: 5 Cans of beer per week     Comment:  weekly         Allergies   Allergen Reactions     Peanuts [Peanut-Derived]      Eyes swell     Trees Other (See Comments)     Tree sap makes her eyes swell shut     No current outpatient medications on file.     No current facility-administered medications for this visit.         Review of Systems   Constitutional, HEENT, cardiovascular, pulmonary, gi and gu systems are negative, except as otherwise noted.      Objective           Vitals:  No vitals were obtained today due to virtual visit.    Physical Exam   healthy, alert and no distress  PSYCH: Alert and oriented times 3; coherent speech, normal   rate and volume, able to articulate logical thoughts, able   to abstract reason, no tangential thoughts, no hallucinations   or delusions  Her affect is normal  RESP: No cough, no audible wheezing,  able to talk in full sentences  Remainder of exam unable to be completed due to telephone visits            Phone call duration: 5 minutes

## 2022-01-10 NOTE — NURSING NOTE
Patient is having nasal congestion, cough, headaches, fevers on/off.  Brandi Najera LPN ....................  1/10/2022   11:01 AM

## 2022-01-10 NOTE — PATIENT INSTRUCTIONS
Follow up within 1 to 2 weeks for recheck of gastris/ulcer - appointment scheduled for 1/20/22    Start Omeprazole twice daily - take 45 to 60 minutes prior to eating     Start Augmentin for sinus infection   Start eye drops for infection     NO ibuprofen    Ok to take tylenol

## 2022-01-11 ASSESSMENT — ANXIETY QUESTIONNAIRES: GAD7 TOTAL SCORE: 0

## 2022-01-19 NOTE — PROGRESS NOTES
Assessment & Plan     1. Infection due to 2019 novel coronavirus  Discussed with patient that described symptoms of fatigue, headache, dizziness, nasal congestion are most likely related to her recent COVID infection.  Discussed this may take several weeks to months to fully resolved.  Encouraged continued management with Tylenol or ibuprofen, over-the-counter cough or cold medications. Follow up as needed.   - CBC and Differential; Future  - Basic Metabolic Panel; Future  - CBC and Differential  - Basic Metabolic Panel    2. Hypokalemia  Recent labs showing low kalemia at 2.9.  BMP pending for repeat evaluation.  Will notify with results and adjust treatment if indicated.  - Basic Metabolic Panel; Future  - Basic Metabolic Panel    3. Perforation of tympanic membrane, left  Symptoms and exam consistent with perforation of tympanic membrane on left.  Encouraged symptomatic management with Tylenol or ibuprofen, avoid water in ear.  If no improvement over the next few weeks consider ENT referral.    4. PUD (peptic ulcer disease)  Patient never received Prilosec that was prescribed through [facility] clinic.  Prescription as below for PUD symptoms.  Follow-up as needed.  - omeprazole (PRILOSEC) 40 MG DR capsule; Take 1 capsule (40 mg) by mouth 2 times daily  Dispense: 120 capsule; Refill: 0    5. Epigastric pain  See #4.  - omeprazole (PRILOSEC) 40 MG DR capsule; Take 1 capsule (40 mg) by mouth 2 times daily  Dispense: 120 capsule; Refill: 0      No follow-ups on file.    Omayra Sousa PA-C  [facility]    Oliver Carranza is a 46 year old who presents for the following health issues     HPI   Here for [facility] follow up. Seen in the [facility] on 01/10/2022 where she was diagnosed with PUD and started on omeprazole, bacterial conjunctivitis treated with ofloxacin drops, sinusitis treated with Augmentin, and hypokalemia treated with daily potassium supplements.     Patient is presenting  today overall still not feeling well.  She reports she continues to struggle with nasal congestion, fatigue, headache, dizziness, left ear pain.  She completed the antibiotic prescription this morning.  She has been managing symptoms with Tylenol, over-the-counter cough medications.  She still has been unable to return to work.  Patient reports she was never able to receive the original prescription for omeprazole.  She would like this refilled through [facility] pharmacy today.  Continues to struggle with epigastric pain.    PAST MEDICAL HISTORY: No past medical history on file.    PAST SURGICAL HISTORY:   Past Surgical History:   Procedure Laterality Date     ABDOMEN SURGERY      hernia surgery X5     BIOPSY BREAST NEEDLE LOCALIZATION Left 1/30/2017    Procedure: BIOPSY BREAST NEEDLE LOCALIZATION;  Surgeon: Sita Rushing MD;  Location: [facility]     BREAST SURGERY Left 09/28/2016    biopsy     BREAST SURGERY Left 09/13/2016    biopsy     COLONOSCOPY N/A 2/18/2021    Procedure: COLONOSCOPY, WITH POLYPECTOMY AND BIOPSY, and hemorrhoid banding;  Surgeon: Naeem Camarena MD;  Location: [facility] OR     GYN SURGERY  2004    tubal ligation     ORTHOPEDIC SURGERY Left [id number]    left knee medial retinacular repair, VMO advancement, and reapir of partial tear of quadriceps tendon     ORTHOPEDIC SURGERY Left 04/18/2017    left knee manipulation       FAMILY HISTORY:   Family History   Problem Relation Age of Onset     Other - See Comments Mother         back surgery     Other Cancer Father         skin cancer     Hypertension Father      Gastrointestinal Disease Father         Diverticulosis       SOCIAL HISTORY:   Social History     Tobacco Use     Smoking status: Current Every Day Smoker     Packs/day: 1.00     Years: 20.00     Pack years: 20.00     Types: Cigarettes     Smokeless tobacco: Never Used   Substance Use Topics     Alcohol use: Yes     Alcohol/week: 5.0 standard drinks     Types: 5 Cans of beer per week      "Comment:  weekly         Allergies   Allergen Reactions     Peanuts [Peanut-Derived]      Eyes swell     Trees Other (See Comments)     Tree sap makes her eyes swell shut     Current Outpatient Medications   Medication     omeprazole (PRILOSEC) 40 MG DR capsule     amoxicillin-clavulanate (AUGMENTIN) 875-125 MG tablet     ofloxacin (OCUFLOX) 0.3 % ophthalmic solution     potassium chloride ER (KLOR-CON M) 10 MEQ CR tablet     No current facility-administered medications for this visit.         Review of Systems   Per HPI        Objective    /74   Pulse 72   Temp 97.2  F (36.2  C)   Resp 12   Ht 1.727 m (5' 8\")   Wt 82.1 kg (181 lb)   LMP 12/27/2021   SpO2 98%   Breastfeeding No   BMI 27.52 kg/m    Body mass index is 27.52 kg/m .  Physical Exam   General: Pleasant, in no apparent distress.  Eyes: Sclera are white and conjunctiva are clear bilaterally. Lacrimal apparatus free of erythema, edema, and discharge bilaterally.  Ears: External ears without erythema or edema. Right tympanic membrane is pearly white and without erythema, scarring or perforations. Left TM with small hole without drainage, bulging, signs of infection. External auditory canals are free of foreign bodies, erythema, ulcers, and masses.  Nose: External nose is symmetrical and free of lesions and deformities. Mucosa is soft pink and without erythema, edema, bleeding, or exudate. No septal perforation or deviation.  Oropharynx: Oral mucosa is pink and without ulcers, nodules, and white patches. Tongue is symmetrical, pink, and without masses or lesions. Pharynx is pink, symmetrical, and without lesions. Uvula is midline. Tonsils are pink, symmetrical, and without edema, ulcers, or exudates, and 1+ bilaterally.  Neck: No cervical lymphadenopathy on inspection and palpation.  Cardiovascular: Regular rate and rhythm with S1 equal to S2. No murmurs, friction rubs, or gallops.   Respiratory: Lungs are resonant and clear to auscultation " bilaterally. No wheezes, crackles, or rhonchi.  Psych: Appropriate mood and affect.    Results for orders placed or performed in visit on 01/20/22   CBC with platelets and differential     Status: Abnormal   Result Value Ref Range    WBC Count 14.0 (H) 4.0 - 11.0 10e3/uL    RBC Count 4.24 3.80 - 5.20 10e6/uL    Hemoglobin 15.4 11.7 - 15.7 g/dL    Hematocrit 44.0 35.0 - 47.0 %     (H) 78 - 100 fL    MCH 36.3 (H) 26.5 - 33.0 pg    MCHC 35.0 31.5 - 36.5 g/dL    RDW 12.7 10.0 - 15.0 %    Platelet Count 292 150 - 450 10e3/uL    % Neutrophils 76 %    % Lymphocytes 19 %    % Monocytes 5 %    % Eosinophils 0 %    % Basophils 0 %    % Immature Granulocytes 0 %    NRBCs per 100 WBC 0 <1 /100    Absolute Neutrophils 10.5 (H) 1.6 - 8.3 10e3/uL    Absolute Lymphocytes 2.7 0.8 - 5.3 10e3/uL    Absolute Monocytes 0.6 0.0 - 1.3 10e3/uL    Absolute Eosinophils 0.1 0.0 - 0.7 10e3/uL    Absolute Basophils 0.1 0.0 - 0.2 10e3/uL    Absolute Immature Granulocytes 0.1 <=0.4 10e3/uL    Absolute NRBCs 0.0 10e3/uL   CBC and Differential     Status: Abnormal    Narrative    The following orders were created for panel order CBC and Differential.  Procedure                               Abnormality         Status                     ---------                               -----------         ------                     CBC with platelets and d...[[id number]]  Abnormal            Final result                 Please view results for these tests on the individual orders.

## 2022-01-20 ENCOUNTER — OFFICE VISIT (OUTPATIENT)
Dept: FAMILY MEDICINE | Facility: OTHER | Age: 47
End: 2022-01-20
Attending: PHYSICIAN ASSISTANT
Payer: COMMERCIAL

## 2022-01-20 VITALS
SYSTOLIC BLOOD PRESSURE: 132 MMHG | HEIGHT: 68 IN | DIASTOLIC BLOOD PRESSURE: 74 MMHG | HEART RATE: 72 BPM | WEIGHT: 181 LBS | RESPIRATION RATE: 12 BRPM | BODY MASS INDEX: 27.43 KG/M2 | TEMPERATURE: 97.2 F | OXYGEN SATURATION: 98 %

## 2022-01-20 DIAGNOSIS — E87.6 HYPOKALEMIA: ICD-10-CM

## 2022-01-20 DIAGNOSIS — U07.1 INFECTION DUE TO 2019 NOVEL CORONAVIRUS: Primary | ICD-10-CM

## 2022-01-20 DIAGNOSIS — R10.13 EPIGASTRIC PAIN: ICD-10-CM

## 2022-01-20 DIAGNOSIS — H72.92 PERFORATION OF TYMPANIC MEMBRANE, LEFT: ICD-10-CM

## 2022-01-20 DIAGNOSIS — K27.9 PUD (PEPTIC ULCER DISEASE): ICD-10-CM

## 2022-01-20 LAB
ANION GAP SERPL CALCULATED.3IONS-SCNC: 7 MMOL/L (ref 3–14)
BASOPHILS # BLD AUTO: 0.1 10E3/UL (ref 0–0.2)
BASOPHILS NFR BLD AUTO: 0 %
BUN SERPL-MCNC: 10 MG/DL (ref 7–25)
CALCIUM SERPL-MCNC: 9.4 MG/DL (ref 8.6–10.3)
CHLORIDE BLD-SCNC: 105 MMOL/L (ref 98–107)
CO2 SERPL-SCNC: 29 MMOL/L (ref 21–31)
CREAT SERPL-MCNC: 0.62 MG/DL (ref 0.6–1.2)
EOSINOPHIL # BLD AUTO: 0.1 10E3/UL (ref 0–0.7)
EOSINOPHIL NFR BLD AUTO: 0 %
ERYTHROCYTE [DISTWIDTH] IN BLOOD BY AUTOMATED COUNT: 12.7 % (ref 10–15)
GFR SERPL CREATININE-BSD FRML MDRD: >90 ML/MIN/1.73M2
GLUCOSE BLD-MCNC: 77 MG/DL (ref 70–105)
HCT VFR BLD AUTO: 44 % (ref 35–47)
HGB BLD-MCNC: 15.4 G/DL (ref 11.7–15.7)
IMM GRANULOCYTES # BLD: 0.1 10E3/UL
IMM GRANULOCYTES NFR BLD: 0 %
LYMPHOCYTES # BLD AUTO: 2.7 10E3/UL (ref 0.8–5.3)
LYMPHOCYTES NFR BLD AUTO: 19 %
MCH RBC QN AUTO: 36.3 PG (ref 26.5–33)
MCHC RBC AUTO-ENTMCNC: 35 G/DL (ref 31.5–36.5)
MCV RBC AUTO: 104 FL (ref 78–100)
MONOCYTES # BLD AUTO: 0.6 10E3/UL (ref 0–1.3)
MONOCYTES NFR BLD AUTO: 5 %
NEUTROPHILS # BLD AUTO: 10.5 10E3/UL (ref 1.6–8.3)
NEUTROPHILS NFR BLD AUTO: 76 %
NRBC # BLD AUTO: 0 10E3/UL
NRBC BLD AUTO-RTO: 0 /100
PLATELET # BLD AUTO: 292 10E3/UL (ref 150–450)
POTASSIUM BLD-SCNC: 4.3 MMOL/L (ref 3.5–5.1)
RBC # BLD AUTO: 4.24 10E6/UL (ref 3.8–5.2)
SODIUM SERPL-SCNC: 141 MMOL/L (ref 134–144)
WBC # BLD AUTO: 14 10E3/UL (ref 4–11)

## 2022-01-20 PROCEDURE — 80048 BASIC METABOLIC PNL TOTAL CA: CPT | Mod: ZL | Performed by: PHYSICIAN ASSISTANT

## 2022-01-20 PROCEDURE — 99214 OFFICE O/P EST MOD 30 MIN: CPT | Performed by: PHYSICIAN ASSISTANT

## 2022-01-20 PROCEDURE — 85025 COMPLETE CBC W/AUTO DIFF WBC: CPT | Mod: ZL | Performed by: PHYSICIAN ASSISTANT

## 2022-01-20 PROCEDURE — 36415 COLL VENOUS BLD VENIPUNCTURE: CPT | Mod: ZL | Performed by: PHYSICIAN ASSISTANT

## 2022-01-20 PROCEDURE — G0463 HOSPITAL OUTPT CLINIC VISIT: HCPCS

## 2022-01-20 RX ORDER — OMEPRAZOLE 40 MG/1
40 CAPSULE, DELAYED RELEASE ORAL 2 TIMES DAILY
Qty: 120 CAPSULE | Refills: 0 | Status: SHIPPED | OUTPATIENT
Start: 2022-01-20 | End: 2022-06-27

## 2022-01-20 ASSESSMENT — PAIN SCALES - GENERAL: PAINLEVEL: SEVERE PAIN (6)

## 2022-01-20 ASSESSMENT — MIFFLIN-ST. JEOR: SCORE: 1509.51

## 2022-01-20 NOTE — NURSING NOTE
Patient presents to clinic for follow up from [facility] clinic. She is having left ear pain.  Brandi Najera LPN ....................  1/20/2022   2:16 PM

## 2022-01-20 NOTE — LETTER
[facility]  [address]  [place] [address]  [phone]  Dept: [phone]      January 20, 2022      Patient: Tere Camarena   YOB: 1975   Date of Visit: 1/20/2022       To Whom It May Concern:    Tere Camarena was seen and treated in our clinic today. Please excuse her absence from work while she is recovering.            Sincerely,         Omayra Sousa PA-C on 1/20/2022 at 2:35 PM

## 2022-01-20 NOTE — PATIENT INSTRUCTIONS
Your symptoms are still related to your recent COVID infection as well as your perforated ear drum on the left. Continue with over the counter medications. If you are still struggling with your left ear symptoms in 2-3 weeks, please let me know as I would want you to see an ear, nose, throat specialist at that point to consider patching the ear drum.

## 2022-02-27 ENCOUNTER — HEALTH MAINTENANCE LETTER (OUTPATIENT)
Age: 47
End: 2022-02-27

## 2022-06-27 ENCOUNTER — OFFICE VISIT (OUTPATIENT)
Dept: FAMILY MEDICINE | Facility: OTHER | Age: 47
End: 2022-06-27
Attending: NURSE PRACTITIONER
Payer: COMMERCIAL

## 2022-06-27 VITALS
RESPIRATION RATE: 16 BRPM | DIASTOLIC BLOOD PRESSURE: 64 MMHG | HEIGHT: 68 IN | WEIGHT: 190.5 LBS | BODY MASS INDEX: 28.87 KG/M2 | OXYGEN SATURATION: 98 % | TEMPERATURE: 98.1 F | SYSTOLIC BLOOD PRESSURE: 110 MMHG | HEART RATE: 65 BPM

## 2022-06-27 DIAGNOSIS — S80.861A TICK BITE OF RIGHT LOWER LEG, INITIAL ENCOUNTER: ICD-10-CM
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
## 2022-06-27 DIAGNOSIS — G44.209 TENSION HEADACHE: Primary | ICD-10-CM

## 2022-06-27 LAB
B BURGDOR IGG+IGM SER QL: 0.11
BASOPHILS # BLD AUTO: 0 10E3/UL (ref 0–0.2)
BASOPHILS NFR BLD AUTO: 1 %
EOSINOPHIL # BLD AUTO: 0.1 10E3/UL (ref 0–0.7)
EOSINOPHIL NFR BLD AUTO: 2 %
ERYTHROCYTE [DISTWIDTH] IN BLOOD BY AUTOMATED COUNT: 13.8 % (ref 10–15)
HCT VFR BLD AUTO: 44.1 % (ref 35–47)
HGB BLD-MCNC: 15.6 G/DL (ref 11.7–15.7)
IMM GRANULOCYTES # BLD: 0 10E3/UL
IMM GRANULOCYTES NFR BLD: 0 %
LYMPHOCYTES # BLD AUTO: 1.7 10E3/UL (ref 0.8–5.3)
LYMPHOCYTES NFR BLD AUTO: 29 %
MCH RBC QN AUTO: 36.4 PG (ref 26.5–33)
MCHC RBC AUTO-ENTMCNC: 35.4 G/DL (ref 31.5–36.5)
MCV RBC AUTO: 103 FL (ref 78–100)
MONOCYTES # BLD AUTO: 0.4 10E3/UL (ref 0–1.3)
MONOCYTES NFR BLD AUTO: 7 %
NEUTROPHILS # BLD AUTO: 3.6 10E3/UL (ref 1.6–8.3)
NEUTROPHILS NFR BLD AUTO: 61 %
NRBC # BLD AUTO: 0 10E3/UL
NRBC BLD AUTO-RTO: 0 /100
PLATELET # BLD AUTO: 196 10E3/UL (ref 150–450)
RBC # BLD AUTO: 4.29 10E6/UL (ref 3.8–5.2)
WBC # BLD AUTO: 5.9 10E3/UL (ref 4–11)

## 2022-06-27 PROCEDURE — 96372 THER/PROPH/DIAG INJ SC/IM: CPT | Performed by: PHYSICIAN ASSISTANT

## 2022-06-27 PROCEDURE — 250N000011 HC RX IP 250 OP 636: Performed by: PHYSICIAN ASSISTANT

## 2022-06-27 PROCEDURE — 99214 OFFICE O/P EST MOD 30 MIN: CPT | Performed by: PHYSICIAN ASSISTANT

## 2022-06-27 PROCEDURE — 85004 AUTOMATED DIFF WBC COUNT: CPT | Mod: ZL | Performed by: PHYSICIAN ASSISTANT

## 2022-06-27 PROCEDURE — 87798 DETECT AGENT NOS DNA AMP: CPT | Mod: ZL | Performed by: PHYSICIAN ASSISTANT

## 2022-06-27 PROCEDURE — 36415 COLL VENOUS BLD VENIPUNCTURE: CPT | Mod: ZL | Performed by: PHYSICIAN ASSISTANT

## 2022-06-27 PROCEDURE — G0463 HOSPITAL OUTPT CLINIC VISIT: HCPCS

## 2022-06-27 PROCEDURE — G0463 HOSPITAL OUTPT CLINIC VISIT: HCPCS | Mod: 25

## 2022-06-27 PROCEDURE — 86618 LYME DISEASE ANTIBODY: CPT | Mod: ZL | Performed by: PHYSICIAN ASSISTANT

## 2022-06-27 RX ORDER — KETOROLAC TROMETHAMINE 30 MG/ML
60 INJECTION, SOLUTION INTRAMUSCULAR; INTRAVENOUS ONCE
Status: COMPLETED | OUTPATIENT
Start: 2022-06-27 | End: 2022-06-27

## 2022-06-27 RX ORDER — KETOROLAC TROMETHAMINE 10 MG/1
10 TABLET, FILM COATED ORAL EVERY 6 HOURS PRN
Qty: 20 TABLET | Refills: 0 | Status: SHIPPED | OUTPATIENT
Start: 2022-06-27 | End: 2022-08-16

## 2022-06-27 RX ORDER — DOXYCYCLINE 100 MG/1
100 CAPSULE ORAL 2 TIMES DAILY
Qty: 28 CAPSULE | Refills: 0 | Status: SHIPPED | OUTPATIENT
Start: 2022-06-27 | End: 2022-07-11

## 2022-06-27 RX ADMIN — KETOROLAC TROMETHAMINE 60 MG: 30 INJECTION, SOLUTION INTRAMUSCULAR at 12:45

## 2022-06-27 ASSESSMENT — PAIN SCALES - GENERAL: PAINLEVEL: SEVERE PAIN (7)

## 2022-06-27 NOTE — PATIENT INSTRUCTIONS
Please refer to your AVS for follow up and pain/symptoms management recommendations (I.e.: medications, helpful conservative treatment modalities, appropriate follow up if need to a specialist or family practice, etc.). Please return to either your primary care provider rapid/urgent care clinic if your symptoms change or worsen.     Discharge instructions:  -If you were prescribed a medication(s), please take this as prescribed/directed  -Monitor your symptoms, if changing/worsening, return to UC/ER or PCP for follow up    Tick Bite   -For some, labs are also drawn to check for Lyme disease - if you had labs drawn, please note that these labs can take 3-7 days to return, either your PCP or a member of the UC team should reach out to you with your results, if you do not receive these, please reach out to your PCP.   -Please take the course of antibiotics until completion (if prescribed).    -For prevention of further bites, we recommend long shirts/pants while outdoors, tick repellant with > 20% DEET, take a shower or bathe within 2 hours of being outdoors, check yourself for ticks (look in unusual locations such as hair, behind ears/knees, etc.).   -For pain control (if needed), if you are able to take Ibuprofen and Tylenol, we recommend alternating these (see note below). Do not wear a patch over your eye (unless directed to do so).    -Alternate every 4 hours as needed. I.e.: Ibuprofen at 8am, Tylenol 12pm, Ibuprofen 4pm    -Daily maximum of Tylenol is 4000mg (recommend staying under 3000mg)   -Daily maximum of Ibuprofen is 1200mg (take no more than six 200mg pills a day)    Recommend to hold daily multivitamin  Doxycycline as this can make the medication less effective.  We also recommend that you watch your sunlight exposure as you will burn quicker than usual.    Additional Information:  Any remaining embedded matter can be left and will clear naturally.   Monitor the area where ticks were removed as they can  become infected also. May use antibacterial ointment.    -Monitor for the below symptoms for 30 days and call your healthcare provider if you get any of the following:   ? Fatigue    ? Headache    ? Neck stiffness    ? Myalgias/muscle soreness   ? Arthralgias/joint pain   ? Regional lymphadenopathy/swollen lymph nodes   ? Fever    * Keep vigilant with tick checks.    Risk of Lyme disease is very low with the tick has been attached for fewer than 36 hours.    Common locations to check for ticks in the future include entering around her ears, and and around the hair, inside the bellybutton, under your arms, around her waist, between your legs and the back sides of your knees.    Approach to prophylaxis -- per up to date guidelines, they agree with the Infectious Diseases Society of [place] (IDSA) guidelines that recommend antibiotic prophylaxis only in patients who meet all of the following criteria:  ?Attached tick identified as an adult or nymphal I. scapularis tick (deer tick).  ?Tick is estimated to have been attached for ?36 hours (by degree of engorgement or time of exposure).  ?Prophylaxis is begun within 72 hours of tick removal.  ?Local rate of infection of ticks with B. burgdorferi is ?20 percent (these rates of infection have been shown to occur in parts of [place], parts of the [place], and parts of [place] and [place]).

## 2022-06-27 NOTE — NURSING NOTE
"Patient presents to the clinic today for body aches, fatigue and headache for the past 2 weeks.  Patient states she was bit by a tick last month, and would like to be checked for lyme disease.  Gege Pollard LPN 6/27/2022   12:24 PM    Chief Complaint   Patient presents with     Fatigue       Initial /64 (BP Location: Right arm, Patient Position: Sitting, Cuff Size: Adult Regular)   Pulse 65   Temp 98.1  F (36.7  C) (Tympanic)   Resp 16   Ht 1.727 m (5' 8\")   Wt 86.4 kg (190 lb 8 oz)   LMP 06/18/2022 (Exact Date)   SpO2 98%   Breastfeeding No   BMI 28.97 kg/m   Estimated body mass index is 28.97 kg/m  as calculated from the following:    Height as of this encounter: 1.727 m (5' 8\").    Weight as of this encounter: 86.4 kg (190 lb 8 oz).  Medication Reconciliation: complete  Gege Pollard LPN    "

## 2022-06-27 NOTE — PROGRESS NOTES
ASSESSMENT/PLAN:    I have reviewed the nursing notes.  I have reviewed the findings, diagnosis, plan and need for follow up with the patient.    1. Tension headache  - ketorolac (TORADOL) 10 MG tablet; Take 1 tablet (10 mg) by mouth every 6 hours as needed for moderate pain  Dispense: 20 tablet; Refill: 0  - ketorolac (TORADOL) injection 60 mg  - Headache, likely due to tick bite. Symptoms ongoing x 2 weeks. COVID tests negative at home. Will give toradol injection and toradol take home. OK to alternate with Tylenol, no additional NSAIDs.     2. Tick bite of right lower leg, initial encounter  - Lyme Disease Total Abs Bld with Reflex to Confirm CLIA  - Ehrlichia Anaplasma Sp by PCR  - Babesia Species by PCR  - doxycycline hyclate (VIBRAMYCIN) 100 MG capsule; Take 1 capsule (100 mg) by mouth 2 times daily for 14 days  Dispense: 28 capsule; Refill: 0  - CBC and Differential  - Vital signs stable. PE notable for tick bite, entirety of HPI/PE available for review below.  Prescription of Doxycycline 100 mg PO BID x 14 days duration. Use caution in sunlight as can lead to increased risk of sunburn while on ABX.  Avoid taking medication with daily multivitamin. Lab testing: in process for tickborne illness, CBC stable. Discussed to monitor for fevers, chills/flu like symptoms, stiffness of neck, swollen lymph nodes (lymphadenopathy), neurologic or cardiac changes, worsening joint/muscle aches, etc., if these symptoms occur, patient is agreeable to return for reevaluation. Patient is in agreement and understanding of the above treatment plan. All questions and concerns were addressed and answered to patient's satisfaction. AVS reviewed with patient.    Discussed warning signs/symptoms indicative of need to f/u    Follow up if symptoms persist or worsen or concerns    I explained my diagnostic considerations and recommendations to the patient, who voiced understanding and agreement with the treatment plan. All questions  were answered. We discussed potential side effects of any prescribed or recommended therapies, as well as expectations for response to treatments.    Alla Cardenas PA-C  6/27/2022  12:29 PM    HPI:    Tere Camarena is a 47 year old female  who [facility] for concerns of body aches, fatigue and headache for the past 2 weeks.  Patient states she was bit by a tick last month, and would like to be checked for lyme disease.    Symptoms: see above  Location: right medial thigh    Type of Tick: deer  Length of attachment: thinking less than 5-6 hours  Rash: no bullseye, but irritation/redness around site  Fever, Chills: low grade  Muscle Aches: yes  Stiffness of Neck: none  Lymphadenopathy Present: No   Facial Nerve Palsy Present: none  Treatments Tried: removal of tick  Prior Tick Bites: none     Last Tetanus Shot: 09/2016    History reviewed. No pertinent past medical history.  Past Surgical History:   Procedure Laterality Date     ABDOMEN SURGERY      hernia surgery X5     BIOPSY BREAST NEEDLE LOCALIZATION Left 1/30/2017    Procedure: BIOPSY BREAST NEEDLE LOCALIZATION;  Surgeon: Sita Rushing MD;  Location: [facility]     BREAST SURGERY Left 09/28/2016    biopsy     BREAST SURGERY Left 09/13/2016    biopsy     COLONOSCOPY N/A 2/18/2021    Procedure: COLONOSCOPY, WITH POLYPECTOMY AND BIOPSY, and hemorrhoid banding;  Surgeon: Naeem Camarena MD;  Location: [facility] OR     GYN SURGERY  2004    tubal ligation     ORTHOPEDIC SURGERY Left [id number]    left knee medial retinacular repair, VMO advancement, and reapir of partial tear of quadriceps tendon     ORTHOPEDIC SURGERY Left 04/18/2017    left knee manipulation     Social History     Tobacco Use     Smoking status: Current Every Day Smoker     Packs/day: 1.00     Years: 20.00     Pack years: 20.00     Types: Cigarettes     Smokeless tobacco: Never Used   Substance Use Topics     Alcohol use: Yes     Alcohol/week: 5.0 standard drinks     Types: 5 Cans of  "beer per week     Comment:  weekly      Current Outpatient Medications   Medication Sig Dispense Refill     ofloxacin (OCUFLOX) 0.3 % ophthalmic solution Place 1-2 drops Into the left eye 4 times daily 5 mL 0     Allergies   Allergen Reactions     Peanuts [Peanut-Derived]      Eyes swell     Trees Other (See Comments)     Tree sap makes her eyes swell shut     Past medical history, past surgical history, current medications and allergies reviewed and accurate to the best of my knowledge.      ROS:  Refer to HPI    /64 (BP Location: Right arm, Patient Position: Sitting, Cuff Size: Adult Regular)   Pulse 65   Temp 98.1  F (36.7  C) (Tympanic)   Resp 16   Ht 1.727 m (5' 8\")   Wt 86.4 kg (190 lb 8 oz)   LMP 06/18/2022 (Exact Date)   SpO2 98%   Breastfeeding No   BMI 28.97 kg/m      EXAM:  General Appearance: Well appearing 47 year old female, appropriate appearance for age. No acute distress   Respiratory: normal chest wall and respirations.  Normal effort.  Clear to auscultation bilaterally, no wheezing, crackles or rhonchi.  No increased work of breathing.  No cough appreciated.  Cardiac: RRR with no murmurs  Musculoskeletal:  Equal movement of bilateral upper extremities.  Equal movement of bilateral lower extremities.  Normal gait.    Dermatological: < 1 cm healing tick bite to medial distal right calf.   Psychological: normal affect, alert, oriented, and pleasant.     Labs:  Results for orders placed or performed in visit on 06/27/22   CBC with platelets and differential     Status: Abnormal   Result Value Ref Range    WBC Count 5.9 4.0 - 11.0 10e3/uL    RBC Count 4.29 3.80 - 5.20 10e6/uL    Hemoglobin 15.6 11.7 - 15.7 g/dL    Hematocrit 44.1 35.0 - 47.0 %     (H) 78 - 100 fL    MCH 36.4 (H) 26.5 - 33.0 pg    MCHC 35.4 31.5 - 36.5 g/dL    RDW 13.8 10.0 - 15.0 %    Platelet Count 196 150 - 450 10e3/uL    % Neutrophils 61 %    % Lymphocytes 29 %    % Monocytes 7 %    % Eosinophils 2 %    % " Basophils 1 %    % Immature Granulocytes 0 %    NRBCs per 100 WBC 0 <1 /100    Absolute Neutrophils 3.6 1.6 - 8.3 10e3/uL    Absolute Lymphocytes 1.7 0.8 - 5.3 10e3/uL    Absolute Monocytes 0.4 0.0 - 1.3 10e3/uL    Absolute Eosinophils 0.1 0.0 - 0.7 10e3/uL    Absolute Basophils 0.0 0.0 - 0.2 10e3/uL    Absolute Immature Granulocytes 0.0 <=0.4 10e3/uL    Absolute NRBCs 0.0 10e3/uL   CBC and Differential     Status: Abnormal    Narrative    The following orders were created for panel order CBC and Differential.  Procedure                               Abnormality         Status                     ---------                               -----------         ------                     CBC with platelets and d...[[id number]]  Abnormal            Final result                 Please view results for these tests on the individual orders.     Xray:  None

## 2022-06-30 LAB
A PHAGOCYTOPH DNA BLD QL NAA+PROBE: NOT DETECTED
B MICROTI DNA BLD QL NAA+PROBE: NOT DETECTED
BABESIA DNA BLD QL NAA+PROBE: NOT DETECTED
E CHAFFEENSIS DNA BLD QL NAA+PROBE: NOT DETECTED
E EWINGII DNA SPEC QL NAA+PROBE: NOT DETECTED
EHRLICHIA DNA SPEC QL NAA+PROBE: NOT DETECTED

## 2022-08-16 ENCOUNTER — APPOINTMENT (OUTPATIENT)
Dept: MRI IMAGING | Facility: OTHER | Age: 47
End: 2022-08-16
Attending: FAMILY MEDICINE
Payer: COMMERCIAL

## 2022-08-16 ENCOUNTER — TRANSFERRED RECORDS (OUTPATIENT)
Dept: HEALTH INFORMATION MANAGEMENT | Facility: OTHER | Age: 47
End: 2022-08-16

## 2022-08-16 ENCOUNTER — OFFICE VISIT (OUTPATIENT)
Dept: FAMILY MEDICINE | Facility: OTHER | Age: 47
End: 2022-08-16
Attending: PHYSICIAN ASSISTANT
Payer: COMMERCIAL

## 2022-08-16 ENCOUNTER — HOSPITAL ENCOUNTER (EMERGENCY)
Facility: OTHER | Age: 47
Discharge: HOME | End: 2022-08-16
Attending: FAMILY MEDICINE | Admitting: FAMILY MEDICINE
Payer: COMMERCIAL

## 2022-08-16 ENCOUNTER — HOSPITAL ENCOUNTER (OUTPATIENT)
Dept: GENERAL RADIOLOGY | Facility: OTHER | Age: 47
Discharge: HOME | End: 2022-08-16
Attending: PHYSICIAN ASSISTANT
Payer: COMMERCIAL

## 2022-08-16 ENCOUNTER — HOSPITAL ENCOUNTER (EMERGENCY)
Facility: OTHER | Age: 47
Discharge: LEFT BEFORE DISCHARGE | End: 2022-08-16
Attending: FAMILY MEDICINE | Admitting: FAMILY MEDICINE
Payer: COMMERCIAL

## 2022-08-16 VITALS
OXYGEN SATURATION: 98 % | DIASTOLIC BLOOD PRESSURE: 80 MMHG | HEART RATE: 61 BPM | RESPIRATION RATE: 18 BRPM | WEIGHT: 198.9 LBS | SYSTOLIC BLOOD PRESSURE: 132 MMHG | BODY MASS INDEX: 30.24 KG/M2 | TEMPERATURE: 97.4 F

## 2022-08-16 VITALS
TEMPERATURE: 97.4 F | RESPIRATION RATE: 16 BRPM | HEIGHT: 68 IN | OXYGEN SATURATION: 98 % | WEIGHT: 198 LBS | SYSTOLIC BLOOD PRESSURE: 133 MMHG | DIASTOLIC BLOOD PRESSURE: 88 MMHG | BODY MASS INDEX: 30.01 KG/M2 | HEART RATE: 60 BPM

## 2022-08-16 VITALS
SYSTOLIC BLOOD PRESSURE: 154 MMHG | TEMPERATURE: 96.8 F | RESPIRATION RATE: 16 BRPM | BODY MASS INDEX: 30.01 KG/M2 | OXYGEN SATURATION: 98 % | WEIGHT: 198 LBS | DIASTOLIC BLOOD PRESSURE: 83 MMHG | HEIGHT: 68 IN | HEART RATE: 61 BPM

## 2022-08-16 DIAGNOSIS — R29.90 NEUROLOGICAL SYMPTOMS: ICD-10-CM

## 2022-08-16 DIAGNOSIS — H53.9 VISION CHANGES: ICD-10-CM

## 2022-08-16 DIAGNOSIS — H53.451 LOSS OF PERIPHERAL VISUAL FIELD, RIGHT: ICD-10-CM

## 2022-08-16 DIAGNOSIS — H54.7 LOSS OF VISION: ICD-10-CM

## 2022-08-16 DIAGNOSIS — R07.89 CHEST DISCOMFORT: Primary | ICD-10-CM

## 2022-08-16 DIAGNOSIS — H53.10 SUBJECTIVE VISUAL DISTURBANCE OF RIGHT EYE: ICD-10-CM

## 2022-08-16 LAB
ALBUMIN SERPL-MCNC: 4.2 G/DL (ref 3.5–5.7)
ALBUMIN UR-MCNC: NEGATIVE MG/DL
ALP SERPL-CCNC: 68 U/L (ref 34–104)
ALT SERPL W P-5'-P-CCNC: 13 U/L (ref 7–52)
ANION GAP SERPL CALCULATED.3IONS-SCNC: 7 MMOL/L (ref 3–14)
APPEARANCE UR: CLEAR
AST SERPL W P-5'-P-CCNC: 20 U/L (ref 13–39)
ATRIAL RATE - MUSE: 49 BPM
BASOPHILS # BLD AUTO: 0 10E3/UL (ref 0–0.2)
BASOPHILS NFR BLD AUTO: 1 %
BILIRUB SERPL-MCNC: 0.6 MG/DL (ref 0.3–1)
BILIRUB UR QL STRIP: NEGATIVE
BUN SERPL-MCNC: 7 MG/DL (ref 7–25)
CALCIUM SERPL-MCNC: 9.4 MG/DL (ref 8.6–10.3)
CHLORIDE BLD-SCNC: 103 MMOL/L (ref 98–107)
CO2 SERPL-SCNC: 28 MMOL/L (ref 21–31)
COLOR UR AUTO: YELLOW
CREAT SERPL-MCNC: 0.74 MG/DL (ref 0.6–1.2)
CRP SERPL-MCNC: <1 MG/L
D DIMER PPP FEU-MCNC: 0.55 UG/ML FEU (ref 0–0.5)
DIASTOLIC BLOOD PRESSURE - MUSE: NORMAL MMHG
EOSINOPHIL # BLD AUTO: 0.1 10E3/UL (ref 0–0.7)
EOSINOPHIL NFR BLD AUTO: 2 %
ERYTHROCYTE [DISTWIDTH] IN BLOOD BY AUTOMATED COUNT: 12.9 % (ref 10–15)
ERYTHROCYTE [SEDIMENTATION RATE] IN BLOOD BY WESTERGREN METHOD: 2 MM/HR (ref 0–20)
GFR SERPL CREATININE-BSD FRML MDRD: >90 ML/MIN/1.73M2
GLUCOSE BLD-MCNC: 90 MG/DL (ref 70–105)
GLUCOSE UR STRIP-MCNC: NEGATIVE MG/DL
HCT VFR BLD AUTO: 43.2 % (ref 35–47)
HGB BLD-MCNC: 15 G/DL (ref 11.7–15.7)
HGB UR QL STRIP: NEGATIVE
IMM GRANULOCYTES # BLD: 0 10E3/UL
IMM GRANULOCYTES NFR BLD: 0 %
INTERPRETATION ECG - MUSE: NORMAL
KETONES UR STRIP-MCNC: NEGATIVE MG/DL
LEUKOCYTE ESTERASE UR QL STRIP: NEGATIVE
LYMPHOCYTES # BLD AUTO: 2 10E3/UL (ref 0.8–5.3)
LYMPHOCYTES NFR BLD AUTO: 29 %
MCH RBC QN AUTO: 35.7 PG (ref 26.5–33)
MCHC RBC AUTO-ENTMCNC: 34.7 G/DL (ref 31.5–36.5)
MCV RBC AUTO: 103 FL (ref 78–100)
MONOCYTES # BLD AUTO: 0.5 10E3/UL (ref 0–1.3)
MONOCYTES NFR BLD AUTO: 7 %
NEUTROPHILS # BLD AUTO: 4.2 10E3/UL (ref 1.6–8.3)
NEUTROPHILS NFR BLD AUTO: 61 %
NITRATE UR QL: NEGATIVE
NRBC # BLD AUTO: 0 10E3/UL
NRBC BLD AUTO-RTO: 0 /100
NT-PROBNP SERPL-MCNC: 33 PG/ML (ref 0–100)
P AXIS - MUSE: 55 DEGREES
PH UR STRIP: 6.5 [PH] (ref 5–9)
PLATELET # BLD AUTO: 185 10E3/UL (ref 150–450)
POTASSIUM BLD-SCNC: 4.3 MMOL/L (ref 3.5–5.1)
PR INTERVAL - MUSE: 144 MS
PROT SERPL-MCNC: 6.9 G/DL (ref 6.4–8.9)
QRS DURATION - MUSE: 82 MS
QT - MUSE: 470 MS
QTC - MUSE: 424 MS
R AXIS - MUSE: 45 DEGREES
RBC # BLD AUTO: 4.2 10E6/UL (ref 3.8–5.2)
RETINOPATHY: NORMAL
SODIUM SERPL-SCNC: 138 MMOL/L (ref 134–144)
SP GR UR STRIP: 1 (ref 1–1.03)
SYSTOLIC BLOOD PRESSURE - MUSE: NORMAL MMHG
T AXIS - MUSE: 44 DEGREES
TROPONIN I SERPL-MCNC: <2.4 PG/ML (ref 0–34)
UROBILINOGEN UR STRIP-MCNC: NORMAL MG/DL
VENTRICULAR RATE- MUSE: 49 BPM
WBC # BLD AUTO: 6.9 10E3/UL (ref 4–11)

## 2022-08-16 PROCEDURE — 70543 MRI ORBT/FAC/NCK W/O &W/DYE: CPT

## 2022-08-16 PROCEDURE — 255N000002 HC RX 255 OP 636: Performed by: FAMILY MEDICINE

## 2022-08-16 PROCEDURE — 85379 FIBRIN DEGRADATION QUANT: CPT | Mod: ZL | Performed by: PHYSICIAN ASSISTANT

## 2022-08-16 PROCEDURE — 36415 COLL VENOUS BLD VENIPUNCTURE: CPT | Mod: ZL | Performed by: PHYSICIAN ASSISTANT

## 2022-08-16 PROCEDURE — 80053 COMPREHEN METABOLIC PANEL: CPT | Mod: ZL | Performed by: PHYSICIAN ASSISTANT

## 2022-08-16 PROCEDURE — 99282 EMERGENCY DEPT VISIT SF MDM: CPT | Performed by: FAMILY MEDICINE

## 2022-08-16 PROCEDURE — 85652 RBC SED RATE AUTOMATED: CPT | Mod: ZL | Performed by: PHYSICIAN ASSISTANT

## 2022-08-16 PROCEDURE — 99285 EMERGENCY DEPT VISIT HI MDM: CPT | Mod: 25 | Performed by: FAMILY MEDICINE

## 2022-08-16 PROCEDURE — A9575 INJ GADOTERATE MEGLUMI 0.1ML: HCPCS | Performed by: FAMILY MEDICINE

## 2022-08-16 PROCEDURE — 84484 ASSAY OF TROPONIN QUANT: CPT | Mod: ZL | Performed by: PHYSICIAN ASSISTANT

## 2022-08-16 PROCEDURE — G0463 HOSPITAL OUTPT CLINIC VISIT: HCPCS | Mod: 25

## 2022-08-16 PROCEDURE — 81003 URINALYSIS AUTO W/O SCOPE: CPT | Mod: ZL | Performed by: PHYSICIAN ASSISTANT

## 2022-08-16 PROCEDURE — 99283 EMERGENCY DEPT VISIT LOW MDM: CPT | Performed by: FAMILY MEDICINE

## 2022-08-16 PROCEDURE — 99215 OFFICE O/P EST HI 40 MIN: CPT | Performed by: PHYSICIAN ASSISTANT

## 2022-08-16 PROCEDURE — 71046 X-RAY EXAM CHEST 2 VIEWS: CPT

## 2022-08-16 PROCEDURE — 85025 COMPLETE CBC W/AUTO DIFF WBC: CPT | Mod: ZL | Performed by: PHYSICIAN ASSISTANT

## 2022-08-16 PROCEDURE — 86140 C-REACTIVE PROTEIN: CPT | Mod: ZL | Performed by: PHYSICIAN ASSISTANT

## 2022-08-16 PROCEDURE — 93010 ELECTROCARDIOGRAM REPORT: CPT | Performed by: INTERNAL MEDICINE

## 2022-08-16 PROCEDURE — 99283 EMERGENCY DEPT VISIT LOW MDM: CPT | Mod: 25 | Performed by: FAMILY MEDICINE

## 2022-08-16 PROCEDURE — 99282 EMERGENCY DEPT VISIT SF MDM: CPT | Mod: 25,27 | Performed by: FAMILY MEDICINE

## 2022-08-16 PROCEDURE — 83880 ASSAY OF NATRIURETIC PEPTIDE: CPT | Mod: ZL | Performed by: PHYSICIAN ASSISTANT

## 2022-08-16 PROCEDURE — 93005 ELECTROCARDIOGRAM TRACING: CPT | Performed by: PHYSICIAN ASSISTANT

## 2022-08-16 PROCEDURE — 70553 MRI BRAIN STEM W/O & W/DYE: CPT

## 2022-08-16 PROCEDURE — 82040 ASSAY OF SERUM ALBUMIN: CPT | Mod: ZL | Performed by: PHYSICIAN ASSISTANT

## 2022-08-16 RX ADMIN — GADOTERATE MEGLUMINE 18 ML: 376.9 INJECTION INTRAVENOUS at 18:47

## 2022-08-16 ASSESSMENT — ACTIVITIES OF DAILY LIVING (ADL)
ADLS_ACUITY_SCORE: 33
ADLS_ACUITY_SCORE: 35

## 2022-08-16 ASSESSMENT — PAIN SCALES - GENERAL: PAINLEVEL: NO PAIN (0)

## 2022-08-16 NOTE — ED TRIAGE NOTES
"Patient presents to ER after being evaluated in the rapid clinic for visual disturbances. She states starting at 0900 this morning she had flashing lights and a visual of a halo and her depth perception was distorted. She had a moment where she lost her peripheral vision. She went to sit down and it since has resolved. She states that her visual symptoms have improved, but states that she \"feels off\".       "

## 2022-08-16 NOTE — NURSING NOTE
"Chief Complaint   Patient presents with     Eye Problem     Patient is here for some blurred vision, and her depth perception being off that started around 9AM this morning at work. Patient states it has gotten a little better since it started and is better sitting.     VISION   Wears glasses: worn for testing  Tool used: Rahman   Right eye:        10/10 (20/20)  Left eye:          10/10 (20/20)    Initial /80   Pulse 61   Temp 97.4  F (36.3  C) (Tympanic)   Resp 18   Wt 90.2 kg (198 lb 14.4 oz)   LMP 06/18/2022 (Exact Date)   SpO2 98%   BMI 30.24 kg/m   Estimated body mass index is 30.24 kg/m  as calculated from the following:    Height as of 6/27/22: 1.727 m (5' 8\").    Weight as of this encounter: 90.2 kg (198 lb 14.4 oz).  Medication Reconciliation: complete    Tamela Nguyen LPN  "

## 2022-08-16 NOTE — ED PROVIDER NOTES
"  History     Chief Complaint   Patient presents with     Loss of Vision           The history is provided by the patient and medical records.     Tere Camarena is a 47 year old female who returned to the ED after a visit to the ophthalmologist today. Here is part of my note from earlier today:    Tere Camarena is a 47 year old female here from [facility].  They saw her for painless change in vision. She had flashing lights in the right eye, change in peripheral vision and change in depth perception. Labs were done and show CBC normal, CMP normal, ESR and CRP normal, BNP normal, d dimer 0.55.  She was sent to the ED for further work up.  She tells me that this started while she was at work today at about 0900. She was noticing flashing lights in her right eye and had no peripheral vision. She noticed that her depth perception was off also.  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  A co-worker had her sit down and she felt better but then when she stood up the same things happened again. Here in the ED she says that her peripheral vision is not normal.  She has no history of blood clots, DVT or PE. She has no LE edema or swelling or tenderness and no shortness of breath.  No history of eye problems like this. Her last eye exam was a year ago at [facility]. VS in the ED /88   Pulse 60   Temp 97.4  F (36.3  C) (Tympanic)   Resp 16   Ht 1.727 m (5' 8\")   Wt 89.8 kg (198 lb)   LMP 06/18/2022 (Exact Date)   SpO2 98%   BMI 30.11 kg/m    Exam shows EOMI, PERRL and conjunctiva is normal.  She has a change in her peripheral vision- she has a deficit at the 9 o'clock position in the right eye only. Her flashing light symptoms are persistent but somewhat better here.   I think she has a retinal detachment of the right eye. I spoke with Dr Bowser at [facility] about this case and they will see her if we send her right over.   4:31 PM  I spoke with staff from [facility] and Dr Bowser did not " feel this was retinal detachment. I called Tere and asked her to return for MRI brain.    Allergies:  Allergies   Allergen Reactions     Peanuts [Peanut-Derived]      Eyes swell     Trees Other (See Comments)     Tree sap makes her eyes swell shut       Problem List:    There are no problems to display for this patient.       Past Medical History:    No past medical history on file.    Past Surgical History:    Past Surgical History:   Procedure Laterality Date     ABDOMEN SURGERY      hernia surgery X5     BIOPSY BREAST NEEDLE LOCALIZATION Left 1/30/2017    Procedure: BIOPSY BREAST NEEDLE LOCALIZATION;  Surgeon: Sita Rushing MD;  Location: [facility] OR     BREAST SURGERY Left 09/28/2016    biopsy     BREAST SURGERY Left 09/13/2016    biopsy     COLONOSCOPY N/A 2/18/2021    Procedure: COLONOSCOPY, WITH POLYPECTOMY AND BIOPSY, and hemorrhoid banding;  Surgeon: Naeem Camarena MD;  Location: [facility] OR     GYN SURGERY  2004    tubal ligation     ORTHOPEDIC SURGERY Left [id number]    left knee medial retinacular repair, VMO advancement, and reapir of partial tear of quadriceps tendon     ORTHOPEDIC SURGERY Left 04/18/2017    left knee manipulation       Family History:    Family History   Problem Relation Age of Onset     Other - See Comments Mother         back surgery     Other Cancer Father         skin cancer     Hypertension Father      Gastrointestinal Disease Father         Diverticulosis       Social History:  Marital Status:   [5]  Social History     Tobacco Use     Smoking status: Current Every Day Smoker     Packs/day: 1.00     Years: 20.00     Pack years: 20.00     Types: Cigarettes     Smokeless tobacco: Never Used   Vaping Use     Vaping Use: Never used   Substance Use Topics     Alcohol use: Yes     Alcohol/week: 5.0 standard drinks     Types: 5 Cans of beer per week     Comment:  weekly      Drug use: No        Medications:    No current outpatient medications on file.        Review of Systems   Eyes:  "Positive for visual disturbance.   All other systems reviewed and are negative.      Physical Exam   BP: (!) 154/83  Pulse: 61  Temp: 96.8  F (36  C)  Resp: 16  Height: 172.7 cm (5' 8\")  Weight: 89.8 kg (198 lb)  SpO2: 98 %      Physical Exam  Vitals and nursing note reviewed.   Constitutional:       General: She is not in acute distress.     Appearance: Normal appearance. She is normal weight. She is not ill-appearing.   Cardiovascular:      Rate and Rhythm: Normal rate and regular rhythm.      Pulses: Normal pulses.   Pulmonary:      Effort: Pulmonary effort is normal. No respiratory distress.      Breath sounds: Normal breath sounds.   Neurological:      General: No focal deficit present.      Mental Status: She is alert and oriented to person, place, and time.      Cranial Nerves: No cranial nerve deficit.      Sensory: No sensory deficit.      Motor: No weakness.      Coordination: Coordination normal.   Psychiatric:         Mood and Affect: Mood normal.         Behavior: Behavior normal.         Results for orders placed or performed during the hospital encounter of 08/16/22 (from the past 24 hour(s))   MR Brain w/o & w Contrast    Narrative    MR BRAIN W/O & W CONTRAST, MR ORBIT W/O & W CONTRAST  8/16/2022 6:46 PM    History: Female, age 47 years, visual changes, elevated D dimer    Comparison: No relevant prior imaging.    Technique: Sagittal T1, axial T2, T2 FLAIR, diffusion, gradient echo,  ADC mapping, T1 and 3 plane T1 fat saturation postcontrast 3-D. Thin  sliced axial T1, T2 fat saturation, T2 and T1 postcontrast fat  saturation. Thin slice coronal T1 and T1 postcontrast fat saturation  of the orbits. .    Findings:   Ventricles and sulci: Normal in size and shape.    Gray and white matter: Normal differentiation.    Cerebellopontine angles: Clear    Extra-axial spaces: Clear.    Enhancement: Normal.    Midline structures: Normal in contour.  Globes: Normal.  Orbits: Normal. Intraconal and extraconal " fat unremarkable.  Extraocular muscles: Normal.  Paranasal sinuses: Mucosal thickening within air-fluid level in the  left sphenoid locule.  Mastoid air cells: Normal.  Diffusion: Normal.      Impression    Impression:  Sphenoid sinusitis mucosal thickening and air-fluid level in the left  sphenoid locule.    Normal appearance of the globes. No evidence of detached retina.      DARION CARRASQUILLO MD         SYSTEM ID:  [id number]   MR Orbit w/o & w Contrast    Narrative    MR BRAIN W/O & W CONTRAST, MR ORBIT W/O & W CONTRAST  8/16/2022 6:46 PM    History: Female, age 47 years, visual changes, elevated D dimer    Comparison: No relevant prior imaging.    Technique: Sagittal T1, axial T2, T2 FLAIR, diffusion, gradient echo,  ADC mapping, T1 and 3 plane T1 fat saturation postcontrast 3-D. Thin  sliced axial T1, T2 fat saturation, T2 and T1 postcontrast fat  saturation. Thin slice coronal T1 and T1 postcontrast fat saturation  of the orbits. .    Findings:   Ventricles and sulci: Normal in size and shape.    Gray and white matter: Normal differentiation.    Cerebellopontine angles: Clear    Extra-axial spaces: Clear.    Enhancement: Normal.    Midline structures: Normal in contour.  Globes: Normal.  Orbits: Normal. Intraconal and extraconal fat unremarkable.  Extraocular muscles: Normal.  Paranasal sinuses: Mucosal thickening within air-fluid level in the  left sphenoid locule.  Mastoid air cells: Normal.  Diffusion: Normal.      Impression    Impression:  Sphenoid sinusitis mucosal thickening and air-fluid level in the left  sphenoid locule.    Normal appearance of the globes. No evidence of detached retina.      DARION CARRASQUILLO MD         SYSTEM ID:  [id number]       Medications   gadoterate meglumine (DOTAREM) injection 20 mL (18 mLs Intravenous Given 8/16/22 1847)       Assessments & Plan (with Medical Decision Making)  Tere Camarena is a 47 year old female who returned to the ED after a visit to the ophthalmologist  "today. Here is part of my note from earlier today:  Tere Camarena is a 47 year old female here from [facility].  They saw her for painless change in vision. She had flashing lights in the right eye, change in peripheral vision and change in depth perception. Labs were done and show CBC normal, CMP normal, ESR and CRP normal, BNP normal, d dimer 0.55.  She was sent to the ED for further work up.  She tells me that this started while she was at work today at about 0900. She was noticing flashing lights in her right eye and had no peripheral vision. She noticed that her depth perception was off also.  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  A co-worker had her sit down and she felt better but then when she stood up the same things happened again. Here in the ED she says that her peripheral vision is not normal.  She has no history of blood clots, DVT or PE. She has no LE edema or swelling or tenderness and no shortness of breath.  No history of eye problems like this. Her last eye exam was a year ago at [facility]. VS in the ED /88   Pulse 60   Temp 97.4  F (36.3  C) (Tympanic)   Resp 16   Ht 1.727 m (5' 8\")   Wt 89.8 kg (198 lb)   LMP 06/18/2022 (Exact Date)   SpO2 98%   BMI 30.11 kg/m    Exam shows EOMI, PERRL and conjunctiva is normal.  She has a change in her peripheral vision- she has a deficit at the 9 o'clock position in the right eye only. Her flashing light symptoms are persistent but somewhat better here.   I think she has a retinal detachment of the right eye. I spoke with Dr Bowser at [facility] about this case and they will see her if we send her right over.   4:31 PM  I spoke with staff from [facility] and Dr Bowser did not feel this was retinal detachment. I called Tere and asked her to return for MRI brain.  7:00 PM  I am signing out her care to Dr Alvarado at change of shift.  Pending orders: MRI     I have reviewed the nursing notes.    I have reviewed the " findings, diagnosis, plan and need for follow up with the patient.    Final diagnoses:   Loss of vision       8/16/2022   [facility], Bradley Moya MD  08/17/22 4072

## 2022-08-16 NOTE — ED PROVIDER NOTES
History     Chief Complaint   Patient presents with     Loss of Vision     Dizziness     The history is provided by the patient and medical records.     Tere Camarena is a 47 year old female here from [facility].  They saw her for painless change in vision. She had flashing lights in the right eye, change in peripheral vision and change in depth perception. Labs were done and show CBC normal, CMP normal, ESR and CRP normal, BNP normal, d dimer 0.55.  She was sent to the ED for further work up.     She tells me that this started while she was at work today at about 0900. She was noticing flashing lights in her right eye and had no peripheral vision. She noticed that her depth perception was off also.  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  A co-worker had her sit down and she felt better but then when she stood up the same things happened again. Here in the ED she says that her peripheral vision is not normal.      She has no history of blood clots, DVT or PE. She has no LE edema or swelling or tenderness and no shortness of breath.  No history of eye problems like this. Her last eye exam was a year ago at [facility].     Allergies:  Allergies   Allergen Reactions     Peanuts [Peanut-Derived]      Eyes swell     Trees Other (See Comments)     Tree sap makes her eyes swell shut       Problem List:    There are no problems to display for this patient.       Past Medical History:    No past medical history on file.    Past Surgical History:    Past Surgical History:   Procedure Laterality Date     ABDOMEN SURGERY      hernia surgery X5     BIOPSY BREAST NEEDLE LOCALIZATION Left 1/30/2017    Procedure: BIOPSY BREAST NEEDLE LOCALIZATION;  Surgeon: Sita Rushing MD;  Location: [facility]     BREAST SURGERY Left 09/28/2016    biopsy     BREAST SURGERY Left 09/13/2016    biopsy     COLONOSCOPY N/A 2/18/2021    Procedure: COLONOSCOPY, WITH POLYPECTOMY AND BIOPSY, and hemorrhoid banding;  Surgeon: Naeem Camarena,  "MD;  Location: [facility] OR     GYN SURGERY  2004    tubal ligation     ORTHOPEDIC SURGERY Left [id number]    left knee medial retinacular repair, VMO advancement, and reapir of partial tear of quadriceps tendon     ORTHOPEDIC SURGERY Left 04/18/2017    left knee manipulation       Family History:    Family History   Problem Relation Age of Onset     Other - See Comments Mother         back surgery     Other Cancer Father         skin cancer     Hypertension Father      Gastrointestinal Disease Father         Diverticulosis       Social History:  Marital Status:   [5]  Social History     Tobacco Use     Smoking status: Current Every Day Smoker     Packs/day: 1.00     Years: 20.00     Pack years: 20.00     Types: Cigarettes     Smokeless tobacco: Never Used   Vaping Use     Vaping Use: Never used   Substance Use Topics     Alcohol use: Yes     Alcohol/week: 5.0 standard drinks     Types: 5 Cans of beer per week     Comment:  weekly      Drug use: No        Medications:    No current outpatient medications on file.        Review of Systems   Eyes: Positive for visual disturbance.   All other systems reviewed and are negative.      Physical Exam   BP: (!) 142/80  Pulse: 55  Temp: 97.4  F (36.3  C)  Resp: 16  Height: 172.7 cm (5' 8\")  Weight: 89.8 kg (198 lb)  SpO2: 98 %      Physical Exam  Vitals and nursing note reviewed.   Constitutional:       General: She is not in acute distress.     Appearance: Normal appearance. She is not ill-appearing.   Eyes:      Extraocular Movements: Extraocular movements intact.      Conjunctiva/sclera: Conjunctivae normal.      Pupils: Pupils are equal, round, and reactive to light.      Comments: Fundoscopic exam appears normal. Her peripheral vision exam seems to wax and wane- at time she tells me that she cannot see anything at the 9 o'clock position with the right eye and at other times she has normal peripheral vision in the right eye at all locations. She has normal vision in both " eyes with clinic exam in the room. She has normal depth perception on exam here.    Neurological:      Mental Status: She is alert.         Results for orders placed or performed in visit on 08/16/22 (from the past 24 hour(s))   UA reflex to Microscopic and Culture    Specimen: Urine, Midstream   Result Value Ref Range    Color Urine Yellow Colorless, Straw, Light Yellow, Yellow    Appearance Urine Clear Clear    Glucose Urine Negative Negative mg/dL    Bilirubin Urine Negative Negative    Ketones Urine Negative Negative mg/dL    Specific Gravity Urine 1.005 1.000 - 1.030    Blood Urine Negative Negative    pH Urine 6.5 5.0 - 9.0    Protein Albumin Urine Negative Negative mg/dL    Urobilinogen Urine Normal Normal, 2.0 mg/dL    Nitrite Urine Negative Negative    Leukocyte Esterase Urine Negative Negative    Narrative    Microscopic not indicated   CBC and Differential    Narrative    The following orders were created for panel order CBC and Differential.  Procedure                               Abnormality         Status                     ---------                               -----------         ------                     CBC with platelets and d...[[id number]]  Abnormal            Final result                 Please view results for these tests on the individual orders.   Comprehensive Metabolic Panel   Result Value Ref Range    Sodium 138 134 - 144 mmol/L    Potassium 4.3 3.5 - 5.1 mmol/L    Chloride 103 98 - 107 mmol/L    Carbon Dioxide (CO2) 28 21 - 31 mmol/L    Anion Gap 7 3 - 14 mmol/L    Urea Nitrogen 7 7 - 25 mg/dL    Creatinine 0.74 0.60 - 1.20 mg/dL    Calcium 9.4 8.6 - 10.3 mg/dL    Glucose 90 70 - 105 mg/dL    Alkaline Phosphatase 68 34 - 104 U/L    AST 20 13 - 39 U/L    ALT 13 7 - 52 U/L    Protein Total 6.9 6.4 - 8.9 g/dL    Albumin 4.2 3.5 - 5.7 g/dL    Bilirubin Total 0.6 0.3 - 1.0 mg/dL    GFR Estimate >90 >60 mL/min/1.73m2   CRP inflammation   Result Value Ref Range    CRP Inflammation <1.0 <10.0  mg/L   Sedimentation Rate (ESR)   Result Value Ref Range    Erythrocyte Sedimentation Rate 2 0 - 20 mm/hr   Troponin I   Result Value Ref Range    Troponin I <2.4 0.0 - 34.0 pg/mL   Brain Natriuretic Peptide (BNP)   Result Value Ref Range    N Terminal Pro BNP Outpatient 33 0 - 100 pg/mL   D dimer quantitative   Result Value Ref Range    D-Dimer Quantitative 0.55 (H) 0.00 - 0.50 ug/mL FEU    Narrative    This D-dimer assay is intended for use in conjunction with a clinical pretest probability assessment model to exclude pulmonary embolism (PE) and deep venous thrombosis (DVT) in outpatients suspected of PE or DVT. The cut-off value is 0.50 ug/mL FEU.   CBC with platelets and differential   Result Value Ref Range    WBC Count 6.9 4.0 - 11.0 10e3/uL    RBC Count 4.20 3.80 - 5.20 10e6/uL    Hemoglobin 15.0 11.7 - 15.7 g/dL    Hematocrit 43.2 35.0 - 47.0 %     (H) 78 - 100 fL    MCH 35.7 (H) 26.5 - 33.0 pg    MCHC 34.7 31.5 - 36.5 g/dL    RDW 12.9 10.0 - 15.0 %    Platelet Count 185 150 - 450 10e3/uL    % Neutrophils 61 %    % Lymphocytes 29 %    % Monocytes 7 %    % Eosinophils 2 %    % Basophils 1 %    % Immature Granulocytes 0 %    NRBCs per 100 WBC 0 <1 /100    Absolute Neutrophils 4.2 1.6 - 8.3 10e3/uL    Absolute Lymphocytes 2.0 0.8 - 5.3 10e3/uL    Absolute Monocytes 0.5 0.0 - 1.3 10e3/uL    Absolute Eosinophils 0.1 0.0 - 0.7 10e3/uL    Absolute Basophils 0.0 0.0 - 0.2 10e3/uL    Absolute Immature Granulocytes 0.0 <=0.4 10e3/uL    Absolute NRBCs 0.0 10e3/uL   EKG 12-lead, tracing only (Same Day)   Result Value Ref Range    Systolic Blood Pressure  mmHg    Diastolic Blood Pressure  mmHg    Ventricular Rate 49 BPM    Atrial Rate 49 BPM    NC Interval 144 ms    QRS Duration 82 ms     ms    QTc 424 ms    P Axis 55 degrees    R AXIS 45 degrees    T Axis 44 degrees    Interpretation ECG       Sinus bradycardia  Otherwise normal ECG  When compared with ECG of 23-DEC-2020 10:27,  No significant change was  "found     XR Chest 2 Views    Narrative    PROCEDURE:  XR CHEST 2 VIEWS    HISTORY: chest discomfort; Chest discomfort.    COMPARISON:  CT chest 5/19/2010    FINDINGS: PA and lateral chest radiograph  Mild emphysematous changes. lung volumes with flattening of  diaphragms. The cardiomediastinal is not enlarged. The trachea is  midline. No focal consolidation, effusion or pneumothorax. No  suspicious osseous lesion or subdiaphragmatic free air.      Impression    IMPRESSION:      No acute cardiopulmonary process.      MICAH FLETCHER MD         SYSTEM ID:  [id number]       Medications - No data to display    Assessments & Plan (with Medical Decision Making)  Tere Camarena is a 47 year old female here from [facility].  They saw her for painless change in vision. She had flashing lights in the right eye, change in peripheral vision and change in depth perception. Labs were done and show CBC normal, CMP normal, ESR and CRP normal, BNP normal, d dimer 0.55.  She was sent to the ED for further work up.  She tells me that this started while she was at work today at about 0900. She was noticing flashing lights in her right eye and had no peripheral vision. She noticed that her depth perception was off also.  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  A co-worker had her sit down and she felt better but then when she stood up the same things happened again. Here in the ED she says that her peripheral vision is not normal.  She has no history of blood clots, DVT or PE. She has no LE edema or swelling or tenderness and no shortness of breath.  No history of eye problems like this. Her last eye exam was a year ago at [facility]. VS in the ED /88   Pulse 60   Temp 97.4  F (36.3  C) (Tympanic)   Resp 16   Ht 1.727 m (5' 8\")   Wt 89.8 kg (198 lb)   LMP 06/18/2022 (Exact Date)   SpO2 98%   BMI 30.11 kg/m    Exam shows EOMI, PERRL and conjunctiva is normal.  She has a change in her peripheral vision- she has a deficit " at the 9 o'clock position in the right eye only. Her flashing light symptoms are persistent but somewhat better here.   I think she has a retinal detachment of the right eye. I spoke with Dr Bowser at [facility] about this case and they will see her if we send her right over.   4:31 PM  I spoke with staff from [facility] and Dr Bowser did not feel this was retinal detachment. I called Tere and asked her to return for MRI brain.     I have reviewed the nursing notes.    I have reviewed the findings, diagnosis, plan and need for follow up with the patient.    Final diagnoses:   Subjective visual disturbance of right eye   Loss of peripheral visual field, right       8/16/2022   [facility]     Bradley Fowler MD  08/16/22 2103       Bradley Fowler MD  08/16/22 [id number]

## 2022-08-16 NOTE — DISCHARGE INSTRUCTIONS
Dr Bowser at [facility] is willing to see you right now.  Ask him to call me with any questions at [phone] in the ED.     Thank you for choosing our Emergency Department for your care.     You may receive a phone call or letter for a survey about your care in our ED.  Please complete this as this is how we improve care for our patients.     If you have any questions after leaving the ED you can call or text me on my cell phone at [phone].    Sincerely,    Dr Jose Fowler M.D.

## 2022-08-16 NOTE — PROGRESS NOTES
ASSESSMENT/PLAN:    I have reviewed the nursing notes.  I have reviewed the findings, diagnosis, plan and need for follow up with the patient.    1. Chest discomfort  2. Vision changes  - CBC and Differential  - Comprehensive Metabolic Panel  - CRP inflammation  - Sedimentation Rate (ESR)  - EKG 12-lead, tracing only (Same Day)  - Troponin I  - Brain Natriuretic Peptide (BNP)  - XR Chest 2 Views  - D dimer quantitative  - Patient presenting with vision changes onset this AM around 9am, she was evaluated by EMS/paramedics who recommended she be evaluated to rule out additional etiology to her symptoms. Patient has normal neurologic examination today. Lab work initiated. CBC with normal WBC and RBC indices today. CMP showing normal hepatic, renal and electrolyte function. ESR and CRP normal. ProBNP initiated due to shortness of breath, this returned normal at 33. Troponin run due to recent chest pain, this also returned normal. D dimer slightly elevated at 0.55. EKG revealed sinus bradycardia, no additional abnormalities noted. CXR - clear chest.   - Discussed case with internal medicine team due to symptoms and elevated D dimer, they recommended reaching out to ED provider team to discuss advanced imaging such as CTA versus CT, etc. I kindly discussed case with SURESH Warren in ER who accepted patient for additional workup, appreciate assistance of ER team.   - Patient agreeable to transfer to ER for additional workup.   - Patient is in agreement and understanding of the above treatment plan. All questions and concerns were addressed and answered to patient's satisfaction. AVS reviewed with patient.     3. Neurological symptoms  - UA reflex to Microscopic and Culture  - UA obtained to rule out additional etiology to infection, this returned normal, no signs of UTI or glucosuria.     Discussed warning signs/symptoms indicative of need to f/u    Follow up if symptoms persist or worsen or concerns    I explained my  diagnostic considerations and recommendations to the patient, who voiced understanding and agreement with the treatment plan. All questions were answered. We discussed potential side effects of any prescribed or recommended therapies, as well as expectations for response to treatments.    Alla Cardenas PA-C  8/16/2022  11:06 AM    HPI:    Tere Camarena is a 47 year old female  who presents to [facility] today for concerns of bilateral eye complaint/concern. She told her coworker her symptoms, worsened and almost half of peripheral vision gone on right side, then she sat down, got better, got up then it got worse. Then the paramedics evaluated her. She was unable to follow eye commands.     Symptoms have gotten significantly better, bright spot is not as big per her report.     Contact or glasses use: YES, glasses  Changes in vision: YES  Change in eye ROM: No  Presence of Flashers/Floaters: YES, started in C-shape, flashing, felt off, started around 9 am this AM.   Discharge description: no change from baseline  Presence of URI Symptoms: No  Eyelid (any symptoms): No, no eyelid edema or drooping   Any dental or jaw pain: No, no facial droop or pain. No slurring of speech.   Any exposure to trauma or chemical burns to eye: No    Treatments Tried: sitting down    Mild chest pain and shortness of breath, she takes her pulse when she feels this way. This has been ongoing for a week. No anxiety or stress level changes. + smoker, 1/2 ppd.    Pulse - felt rapid heart beat and uneasy feeling. No upset stomach or GERD symptoms.     + migraine headaches - she was treated for Topamax for a while - occasional headaches, but not migraine level for about 1.5 years. She has not seen neurology for quite a while. PCP treatment initiated above.     Prior eye or sinus surgeries: No  Similar symptoms in the past: No    + recent tick bite, treated x 6 weeks ago.     PCP: DO Shannon     History reviewed. No pertinent past medical  history.  Past Surgical History:   Procedure Laterality Date     ABDOMEN SURGERY      hernia surgery X5     BIOPSY BREAST NEEDLE LOCALIZATION Left 1/30/2017    Procedure: BIOPSY BREAST NEEDLE LOCALIZATION;  Surgeon: Sita Rushing MD;  Location: [facility] OR     BREAST SURGERY Left 09/28/2016    biopsy     BREAST SURGERY Left 09/13/2016    biopsy     COLONOSCOPY N/A 2/18/2021    Procedure: COLONOSCOPY, WITH POLYPECTOMY AND BIOPSY, and hemorrhoid banding;  Surgeon: Naeem Camarena MD;  Location: [facility] OR     GYN SURGERY  2004    tubal ligation     ORTHOPEDIC SURGERY Left [id number]    left knee medial retinacular repair, VMO advancement, and reapir of partial tear of quadriceps tendon     ORTHOPEDIC SURGERY Left 04/18/2017    left knee manipulation     Social History     Tobacco Use     Smoking status: Current Every Day Smoker     Packs/day: 1.00     Years: 20.00     Pack years: 20.00     Types: Cigarettes     Smokeless tobacco: Never Used   Substance Use Topics     Alcohol use: Yes     Alcohol/week: 5.0 standard drinks     Types: 5 Cans of beer per week     Comment:  weekly      Current Outpatient Medications   Medication Sig Dispense Refill     ketorolac (TORADOL) 10 MG tablet Take 1 tablet (10 mg) by mouth every 6 hours as needed for moderate pain (Patient not taking: Reported on 8/16/2022) 20 tablet 0     ofloxacin (OCUFLOX) 0.3 % ophthalmic solution Place 1-2 drops Into the left eye 4 times daily (Patient not taking: Reported on 8/16/2022) 5 mL 0     Allergies   Allergen Reactions     Peanuts [Peanut-Derived]      Eyes swell     Trees Other (See Comments)     Tree sap makes her eyes swell shut     Past medical history, past surgical history, current medications and allergies reviewed and accurate to the best of my knowledge.      ROS:  Refer to HPI    /80   Pulse 61   Temp 97.4  F (36.3  C) (Tympanic)   Resp 18   Wt 90.2 kg (198 lb 14.4 oz)   LMP 06/18/2022 (Exact Date)   SpO2 98%   BMI 30.24 kg/m       EXAM:  General Appearance: Well appearing 47 year old female, appropriate appearance for age. No acute distress   Ears: Left TM intact, translucent with bony landmarks appreciated, no erythema, no effusion, no bulging, no purulence.  Right TM intact, translucent with bony landmarks appreciated, no erythema, no effusion, no bulging, no purulence.  Left auditory canal clear.  Right auditory canal clear.  Normal external ears, non tender.  Eyes: conjunctivae normal without erythema or irritation, corneas clear, no drainage or crusting, no eyelid swelling, pupils equal   Oropharynx: moist mucous membranes, posterior pharynx without erythema, tonsils symmetric, no erythema, no exudates or petechiae, no post nasal drip seen, no trismus, voice clear.    Sinuses:  No sinus tenderness upon palpation of the frontal or maxillary sinuses  Nose:  Bilateral nares: no erythema, no edema, no drainage or congestion   Neck: supple without adenopathy  Respiratory: normal chest wall and respirations.  Normal effort.  Clear to auscultation bilaterally, no wheezing, crackles or rhonchi.  No increased work of breathing.  No cough appreciated.  Cardiac: RRR with no murmurs  Abdomen: soft, nontender, no rigidity, no rebound tenderness or guarding, normal bowel sounds present  :  No suprapubic tenderness to palpation.  Absent CVA tenderness to palpation.    Musculoskeletal:  Equal movement of bilateral upper extremities.  Equal movement of bilateral lower extremities.  Normal gait.    Neurologic: Awake, alert, oriented to name, place and time.  Cranial nerves II-XII are grossly intact.  Motor shows good stregth, bulk and tone bilaterally.  Cerebellar finger to nose, heel to shin intact.  Sensory is intact to fine touch and pin prick.  Babinski down going, Romberg negative, and gait is normal.  Dermatological: no rashes noted of exposed skin  Psychological: normal affect, alert, oriented, and pleasant.     Labs/Imaging:  Results for  orders placed or performed in visit on 08/16/22   XR Chest 2 Views     Status: None    Narrative    PROCEDURE:  XR CHEST 2 VIEWS    HISTORY: chest discomfort; Chest discomfort.    COMPARISON:  CT chest 5/19/2010    FINDINGS: PA and lateral chest radiograph  Mild emphysematous changes. lung volumes with flattening of  diaphragms. The cardiomediastinal is not enlarged. The trachea is  midline. No focal consolidation, effusion or pneumothorax. No  suspicious osseous lesion or subdiaphragmatic free air.      Impression    IMPRESSION:      No acute cardiopulmonary process.      MICAH FLETCHER MD         SYSTEM ID:  [id number]   Comprehensive Metabolic Panel     Status: Normal   Result Value Ref Range    Sodium 138 134 - 144 mmol/L    Potassium 4.3 3.5 - 5.1 mmol/L    Chloride 103 98 - 107 mmol/L    Carbon Dioxide (CO2) 28 21 - 31 mmol/L    Anion Gap 7 3 - 14 mmol/L    Urea Nitrogen 7 7 - 25 mg/dL    Creatinine 0.74 0.60 - 1.20 mg/dL    Calcium 9.4 8.6 - 10.3 mg/dL    Glucose 90 70 - 105 mg/dL    Alkaline Phosphatase 68 34 - 104 U/L    AST 20 13 - 39 U/L    ALT 13 7 - 52 U/L    Protein Total 6.9 6.4 - 8.9 g/dL    Albumin 4.2 3.5 - 5.7 g/dL    Bilirubin Total 0.6 0.3 - 1.0 mg/dL    GFR Estimate >90 >60 mL/min/1.73m2   CRP inflammation     Status: Normal   Result Value Ref Range    CRP Inflammation <1.0 <10.0 mg/L   Sedimentation Rate (ESR)     Status: Normal   Result Value Ref Range    Erythrocyte Sedimentation Rate 2 0 - 20 mm/hr   Troponin I     Status: Normal   Result Value Ref Range    Troponin I <2.4 0.0 - 34.0 pg/mL   Brain Natriuretic Peptide (BNP)     Status: Normal   Result Value Ref Range    N Terminal Pro BNP Outpatient 33 0 - 100 pg/mL   UA reflex to Microscopic and Culture     Status: Normal    Specimen: Urine, Midstream   Result Value Ref Range    Color Urine Yellow Colorless, Straw, Light Yellow, Yellow    Appearance Urine Clear Clear    Glucose Urine Negative Negative mg/dL    Bilirubin Urine Negative Negative     Ketones Urine Negative Negative mg/dL    Specific Gravity Urine 1.005 1.000 - 1.030    Blood Urine Negative Negative    pH Urine 6.5 5.0 - 9.0    Protein Albumin Urine Negative Negative mg/dL    Urobilinogen Urine Normal Normal, 2.0 mg/dL    Nitrite Urine Negative Negative    Leukocyte Esterase Urine Negative Negative    Narrative    Microscopic not indicated   D dimer quantitative     Status: Abnormal   Result Value Ref Range    D-Dimer Quantitative 0.55 (H) 0.00 - 0.50 ug/mL FEU    Narrative    This D-dimer assay is intended for use in conjunction with a clinical pretest probability assessment model to exclude pulmonary embolism (PE) and deep venous thrombosis (DVT) in outpatients suspected of PE or DVT. The cut-off value is 0.50 ug/mL FEU.   CBC with platelets and differential     Status: Abnormal   Result Value Ref Range    WBC Count 6.9 4.0 - 11.0 10e3/uL    RBC Count 4.20 3.80 - 5.20 10e6/uL    Hemoglobin 15.0 11.7 - 15.7 g/dL    Hematocrit 43.2 35.0 - 47.0 %     (H) 78 - 100 fL    MCH 35.7 (H) 26.5 - 33.0 pg    MCHC 34.7 31.5 - 36.5 g/dL    RDW 12.9 10.0 - 15.0 %    Platelet Count 185 150 - 450 10e3/uL    % Neutrophils 61 %    % Lymphocytes 29 %    % Monocytes 7 %    % Eosinophils 2 %    % Basophils 1 %    % Immature Granulocytes 0 %    NRBCs per 100 WBC 0 <1 /100    Absolute Neutrophils 4.2 1.6 - 8.3 10e3/uL    Absolute Lymphocytes 2.0 0.8 - 5.3 10e3/uL    Absolute Monocytes 0.5 0.0 - 1.3 10e3/uL    Absolute Eosinophils 0.1 0.0 - 0.7 10e3/uL    Absolute Basophils 0.0 0.0 - 0.2 10e3/uL    Absolute Immature Granulocytes 0.0 <=0.4 10e3/uL    Absolute NRBCs 0.0 10e3/uL   EKG 12-lead, tracing only (Same Day)     Status: None (Preliminary result)   Result Value Ref Range    Systolic Blood Pressure  mmHg    Diastolic Blood Pressure  mmHg    Ventricular Rate 49 BPM    Atrial Rate 49 BPM    MS Interval 144 ms    QRS Duration 82 ms     ms    QTc 424 ms    P Axis 55 degrees    R AXIS 45 degrees    T  Axis 44 degrees    Interpretation ECG       Sinus bradycardia  Otherwise normal ECG  When compared with ECG of 23-DEC-2020 10:27,  No significant change was found     CBC and Differential     Status: Abnormal    Narrative    The following orders were created for panel order CBC and Differential.  Procedure                               Abnormality         Status                     ---------                               -----------         ------                     CBC with platelets and d...[[id number]]  Abnormal            Final result                 Please view results for these tests on the individual orders.     EKG sinus bradycardia

## 2022-08-16 NOTE — ED TRIAGE NOTES
"Patient presents to ER after being evaluated by ophthalmology and ruled out a retinal detachment - it was recommended she return back to have a brain MRI to rule out other etiologies.      Patient presents to ER after [facility] evaluated in [facility] for visual disturbances. She states starting at 0900 this morning she had flashing lights and a visual of a halo and her depth perception was distorted. She had a moment where she lost her peripheral vision. She went to sit down and it since has resolved. She states that her visual symptoms have improved, but states that she \"feels off\".          "

## 2022-08-17 NOTE — ED NOTES
Although pt is leaving AMA, provider has given pt some discharge instructions.  Pt would only liked to be called if MRI results are not normal.  She can be reached at [phone]

## 2022-08-17 NOTE — DISCHARGE INSTRUCTIONS
1) You may return at anytime to continue your care  2) You must follow up with your eye doctor tomorrow or return to the ER for a recheck.   3) Follow the aftercare instructions provided

## 2022-08-17 NOTE — ED PROVIDER NOTES
Care assumed at 7.24 pm. Patient post MRI.  Patient upset and wants to go home.  I went in to explain to patient I was simply waiting for the results of the MRI.  Patient refused to stay stating that she has been here long enough. Explained to her there is a risk of leaving including misdiagnosis and worsening of condition.  Patient understands and comprehends but wants to leave at this time. Wants IV to be taken out.  Patient signed out AGAINST MEDICAL ADVICE.    8.40 pm MRI results noted.  Patient called at home to [phone] and informed of results.     Dx: Vision change    Due to the nature of this electronic medical record, laboratory results, imaging results, diagnosis, other information and medications reported above may not represent information available to me at the the time of my care and disposition. Medications reported above may have not been ordered by me.     Portions of the record may have been created with voice recognition software. Occasional wrong-word or 'sound-a- like' substitution may have occurred due to the inherent limitations of voice recognition software. Though the chart has been reviewed, there may be inadvertent transcription errors. Read the chart carefully and recognize, using context, where substitutions have occurred.        Frances Alvarado MD  08/17/22 0090

## 2022-08-18 ENCOUNTER — OFFICE VISIT (OUTPATIENT)
Dept: FAMILY MEDICINE | Facility: OTHER | Age: 47
End: 2022-08-18
Attending: FAMILY MEDICINE
Payer: COMMERCIAL

## 2022-08-18 VITALS
HEART RATE: 61 BPM | OXYGEN SATURATION: 98 % | RESPIRATION RATE: 16 BRPM | DIASTOLIC BLOOD PRESSURE: 88 MMHG | TEMPERATURE: 97.6 F | SYSTOLIC BLOOD PRESSURE: 126 MMHG | HEIGHT: 68 IN | WEIGHT: 194 LBS | BODY MASS INDEX: 29.4 KG/M2

## 2022-08-18 DIAGNOSIS — G43.B0 OPHTHALMOPLEGIC MIGRAINE, NOT INTRACTABLE: Primary | ICD-10-CM

## 2022-08-18 PROCEDURE — 99214 OFFICE O/P EST MOD 30 MIN: CPT | Performed by: FAMILY MEDICINE

## 2022-08-18 PROCEDURE — 96372 THER/PROPH/DIAG INJ SC/IM: CPT | Performed by: FAMILY MEDICINE

## 2022-08-18 PROCEDURE — G0463 HOSPITAL OUTPT CLINIC VISIT: HCPCS | Mod: 25

## 2022-08-18 PROCEDURE — 250N000011 HC RX IP 250 OP 636: Performed by: FAMILY MEDICINE

## 2022-08-18 PROCEDURE — G0463 HOSPITAL OUTPT CLINIC VISIT: HCPCS

## 2022-08-18 RX ORDER — KETOROLAC TROMETHAMINE 30 MG/ML
60 INJECTION, SOLUTION INTRAMUSCULAR; INTRAVENOUS ONCE
Status: COMPLETED | OUTPATIENT
Start: 2022-08-18 | End: 2022-08-18

## 2022-08-18 RX ORDER — TOPIRAMATE 25 MG/1
50 TABLET, FILM COATED ORAL 2 TIMES DAILY
Qty: 120 TABLET | Refills: 0 | Status: SHIPPED | OUTPATIENT
Start: 2022-09-09 | End: 2022-09-07

## 2022-08-18 RX ORDER — TOPIRAMATE 25 MG/1
TABLET, FILM COATED ORAL
Qty: 21 TABLET | Refills: 0 | Status: SHIPPED | OUTPATIENT
Start: 2022-09-01 | End: 2022-09-14

## 2022-08-18 RX ORDER — TOPIRAMATE 25 MG/1
TABLET, FILM COATED ORAL
Qty: 21 TABLET | Refills: 0 | Status: SHIPPED | OUTPATIENT
Start: 2022-08-18 | End: 2022-09-14

## 2022-08-18 RX ADMIN — KETOROLAC TROMETHAMINE 60 MG: 30 INJECTION, SOLUTION INTRAMUSCULAR at 09:04

## 2022-08-18 ASSESSMENT — ENCOUNTER SYMPTOMS
FEVER: 0
CHILLS: 0

## 2022-08-18 ASSESSMENT — PAIN SCALES - GENERAL: PAINLEVEL: EXTREME PAIN (8)

## 2022-08-18 NOTE — PROGRESS NOTES
Assessment & Plan     Ophthalmoplegic migraine, not intractable  Emergency department records reviewed.  Vision changes without evidence of retinal or intracranial abnormality.  She has a history of migraine headaches.  Suspect her current symptoms are secondary to an ocular migraine, given onset of headache after visual symptoms.  She has had successful treatment of intractable migraine with Ketorolac injection in the past.  Ketorolac 60 mg IM x 1 administered in office today.  Encouraged her to avoid other NSAID medications for the next 24 hours though she may continue Tylenol use as needed.  Encouraged application of ice, increased water consumption, and rest in a cool, dark room.  Her migraines have been successfully prevented with Topiramate in the past; she stopped this medication on her own due to significant improvement in her migraine headaches.  Will restart now.  Counseled on appropriate ramp of medication dose, and prescriptions sent to her pharmacy.  She will follow-up in one month for reassessment or sooner if needed.  - ketorolac (TORADOL) injection 60 mg  - topiramate (TOPAMAX) 25 MG tablet; Take 1 tablet (25 mg) by mouth At Bedtime for 7 days, THEN 1 tablet (25 mg) 2 times daily for 7 days.  - topiramate (TOPAMAX) 25 MG tablet; Take 1 tablet (25 mg) by mouth every morning AND 2 tablets (50 mg) At Bedtime. Do all this for 7 days.  - topiramate (TOPAMAX) 25 MG tablet; Take 2 tablets (50 mg) by mouth 2 times daily    30 minutes spent on the date of the encounter doing chart review, history and exam, documentation and further activities per the note    Return in about 4 weeks (around 9/15/2022) for Follow up.    Stephany Ortega,   [facility]    Oliver Carranza is a 47 year old, presenting for the following health issues:  RECHECK (Right eye blurred vision and floaters. ER visit 8/16/22)    HPI     She reports that she randomly developed a floater in her vision of her right eye  "on Tuesday.  She reports associated flashing and colored lights.  This took over her vision, making it so that she could not see.  She also had issues with her depth perception.  [facility] and was [facility] to the ED.  From there she was sent to [facility].  It did not appear that she had any retinal issues.  She then went back to the ED for an MRI which showed some sinusitis but no other abnormalities.  She reports that she has had floaters in her vision since, though not as severe as the episode on Tuesday.  She has since developed a headache, which has persisted.  Head pain is located over the right temporoparietal region.  She describes the pain as a pressure.  She denies any associated nausea.  She has had sensitivity to lights, sounds, and smells.  She has a history of migraines.  She was managed on Topiramate about a year ago \"for awhile.\"  This helped, and she stopped it on her own.  She reports some \"stomach upset\" on the medication.  She has been treating her headaches with Excedrin migraine.  She has tried nothing but Tylenol and Ibuprofen for her current symptoms.    Review of Systems   Constitutional: Negative for chills and fever.          Objective    /88   Pulse 61   Temp 97.6  F (36.4  C) (Tympanic)   Resp 16   Ht 1.727 m (5' 8\")   Wt 88 kg (194 lb)   LMP 07/14/2022   SpO2 98%   Breastfeeding No   BMI 29.50 kg/m    Body mass index is 29.5 kg/m .  Physical Exam  Constitutional:       General: She is not in acute distress.     Appearance: Normal appearance. She is not ill-appearing.   HENT:      Mouth/Throat:      Mouth: Mucous membranes are moist.      Pharynx: Oropharynx is clear. No oropharyngeal exudate or posterior oropharyngeal erythema.   Eyes:      Extraocular Movements: Extraocular movements intact.      Pupils: Pupils are equal, round, and reactive to light.   Cardiovascular:      Rate and Rhythm: Normal rate and regular rhythm.      Heart sounds: No " murmur heard.    No friction rub. No gallop.   Pulmonary:      Effort: Pulmonary effort is normal.      Breath sounds: Normal breath sounds. No wheezing, rhonchi or rales.   Neurological:      General: No focal deficit present.      Mental Status: She is alert.      Cranial Nerves: No cranial nerve deficit.   Psychiatric:         Mood and Affect: Mood normal.

## 2022-08-18 NOTE — NURSING NOTE
"Patient presents with:  RECHECK: Right eye blurred vision and floaters. ER visit 8/16/22        Initial /88   Pulse 61   Temp 97.6  F (36.4  C) (Tympanic)   Resp 16   Ht 1.727 m (5' 8\")   Wt 88 kg (194 lb)   LMP 07/14/2022   SpO2 98%   Breastfeeding No   BMI 29.50 kg/m   Estimated body mass index is 29.5 kg/m  as calculated from the following:    Height as of this encounter: 1.727 m (5' 8\").    Weight as of this encounter: 88 kg (194 lb).         Norma J. Gosselin, LPN   "

## 2022-08-19 NOTE — PROGRESS NOTES
[facility]    CC:  Radial scar on stereotactic biopsy    HPI:  This 41 year old year old female returns in followup for consideration of excision of her left breast lesion.  A radial scar was identified and excision indicated.  She injured her left knee cap and had extensive orthopedic intervention with a left knee ligamentous repair with patellar reattachment.  She has had a long recuperative period but is now ready to proceed with the wire localized biopsy.    History reviewed. No pertinent past medical history.  Past Surgical History   Procedure Laterality Date     Abdomen surgery       hernia surgery X5     Gyn surgery  2004     tubal ligation     Breast surgery Left 09/28/2016     biopsy     Breast surgery Left 09/13/2016     biopsy     Orthopedic surgery Left [id number]     left knee medial retinacular repair, VMO advancement, and reapir of partial tear of quadriceps tendon     Current Outpatient Prescriptions   Medication     HYDROcodone-acetaminophen (NORCO) 5-325 MG per tablet     olopatadine (PATANOL) 0.1 % ophthalmic solution     hydrocortisone (ANUSOL-HC) 2.5 % rectal cream     ibuprofen (ADVIL,MOTRIN) 800 MG tablet     No current facility-administered medications for this visit.     Allergies   Allergen Reactions     Peanuts [Peanut Oil]      Eyes swell     Trees Other (See Comments)     Tree sap makes her eyes swell shut     HABITS:    Social History   Substance Use Topics     Smoking status: Current Every Day Smoker -- 0.50 packs/day for 11 years     Smokeless tobacco: Never Used      Comment: trying on her own     Alcohol Use: Yes      Comment: twice a week       Family History   Problem Relation Age of Onset     Other - See Comments Mother      back surgery     Other Cancer Father      skin cancer     Hypertension Father      GASTROINTESTINAL DISEASE Father      Diverticulosis       REVIEW OF SYSTEMS:  Ten point review of systems negative except those mentioned in the HPI.  "    OBJECTIVE:    /60 mmHg  Pulse 58  Temp(Src) 98.6  F (37  C) (Tympanic)  Resp 20  Ht 5' 8\" (1.727 m)  Wt 180 lb (81.647 kg)  BMI 27.38 kg/m2  SpO2 98%    GENERAL: Generally appears well, in no distress with appropriate affect.  HEENT:   Sclerae anicteric - No cervical, supra/infraclavciular lymphadenopathy, no thyroid masses  Respiratory:  Lungs clear to ausculation bilaterally with good air excursion  Cardiovascular:  Regular Rate and Rhythm with no murmurs gallops or rubs, normal   Breasts:  Tattoo left with two healed biopsy sites, one laterally which by history represents the site revealing the radial scar and one superiorly which was the site of the original ultrasound biopsy which did not show abnormality consistent with the mammographic appearance - hence the re-biopsy.  :  deferred  Extremities:  Extremities normal. No deformities, edema, or skin discoloration.  Skin:  no suspicious lesions or rashes  Neurological: grossly intact    Psych:  Alert, oriented, affect appropriate with normal decision making ability.    IMPRESSION:  For wire localized excision, left breast mass, radial scar as malignant potential dictates excision.    PLAN:  Risk, benefits, technical aspects outlined with questions asked and answered.    Sita Rushing MD, FACS    1/17/2017  10:26 AM    cc:  Yaneth Ochoa NP  " A positive

## 2022-09-07 ENCOUNTER — TELEPHONE (OUTPATIENT)
Dept: FAMILY MEDICINE | Facility: OTHER | Age: 47
End: 2022-09-07

## 2022-09-07 DIAGNOSIS — G43.B0 OPHTHALMOPLEGIC MIGRAINE, NOT INTRACTABLE: ICD-10-CM

## 2022-09-07 RX ORDER — TOPIRAMATE 25 MG/1
50 TABLET, FILM COATED ORAL 2 TIMES DAILY
Qty: 28 TABLET | Refills: 0 | Status: SHIPPED | OUTPATIENT
Start: 2022-09-09 | End: 2022-10-26 | Stop reason: ALTCHOICE

## 2022-09-07 NOTE — TELEPHONE ENCOUNTER
Patient has appointment on 9/14/2022 with [facility].   Brandi Najera LPN ....................  9/7/2022   8:19 AM

## 2022-09-07 NOTE — TELEPHONE ENCOUNTER
Reason for call: Medication or medication refill    Name of medication requested: topamax    Are you out of the medication? Will be out by tomorrow , she thought her appt was today    What pharmacy do you use? [facility]    Preferred method for responding to this message: Telephone Call    Phone number patient can be reached at: Cell number on file:    Telephone Information:   Mobile [phone]       If we cannot reach you directly, may we leave a detailed response at the number you provided? Yes     Shnanon is now gone and she will not have enough to make it till her next appt on 9/14 with jazzy can she get a small refill.    Emilia Scherer on 9/7/2022 at 8:05 AM

## 2022-09-13 NOTE — PROGRESS NOTES
"  Assessment & Plan     1. Other migraine without status migrainosus, not intractable  Longstanding history of migraines.  Recent ED visit with reassuring lab work and imaging.  Recent eye exam unremarkable.  Not responding well to topiramate for prevention. Has not previously tried abortive medication, trial of sumatriptan as outlined below. Recommend keeping a headache journal to help determine if there are specific triggers. Migraine cocktail with oral Decadron, IM Toradol, and oral Imitrex given in clinic. Follow up as needed.   - ketorolac (TORADOL) injection 60 mg  - SUMAtriptan (IMITREX) 50 MG tablet; Take 1 tablet (50 mg) by mouth at onset of headache for migraine May repeat in 2 hours. Max 4 tablets/24 hours.  Dispense: 27 tablet; Refill: 0  - dexamethasone (DECADRON) injectable solution used ORALLY 10 mg        Return if symptoms worsen or fail to improve.    Omayra Sousa PA-C  [facility]   Tere is a 47 year old, presenting for the following health issues:  Headache      History of Present Illness       Headaches:   Since the patient's last clinic visit, headaches are: worsened  The patient is getting headaches:  Everyday  She is not able to do normal daily activities when she has a migraine.  The patient is taking the following rescue/relief medications:  Ibuprofen (Advil, Motrin), Tylenol and Excedrin   Patient states \"The relief is inconsistent\" from the rescue/relief medications.   The patient is taking the following medications to prevent migraines:  Topomax  In the past 4 weeks, the patient has gone to an Urgent Care or Emergency Room 1 time times due to headaches.    She eats 2-3 servings of fruits and vegetables daily.She consumes 3 sweetened beverage(s) daily.She exercises with enough effort to increase her heart rate 20 to 29 minutes per day.  She exercises with enough effort to increase her heart rate 7 days per week.   She is taking medications " regularly.    Today's PHQ-9         PHQ-9 Total Score: 0    PHQ-9 Q9 Thoughts of better off dead/self-harm past 2 weeks :   Not at all    How difficult have these problems made it for you to do your work, take care of things at home, or get along with other people: Not difficult at all  Today's OCTAVIO-7 Score: 2     Here for follow up on headaches. Longstanding history of migraines for which she previously did well with topiramate for prophylaxis. Patient self titrated off the medication as she had been doing well. Six weeks ago she had a visual change, seen in the ED for evaluation. Lab work table other than mildly elevate d dimer. Brain and orbit MRI largely unrevealing. Eye exam was unremarkable. Saw PCP in follow up who restarted her on topiramate. Has not noticed improvement since then. Did go to chiropractor last week which provided some short term improvement. More recently headache and associated photophobia, phonophobia, nausea, and vomiting is worsening again. Managing symptoms with OTC Excedrin migraine, Tylenol, and ibuprofen. Reports headaches are affecting her day to day life, work and home life. Has not previously been on a prn or abortive prescription migraine medication.  Associated fever/chills, difficulties with speech or memory, difficulty swallowing, numbness or tingling, weakness.      PAST MEDICAL HISTORY: History reviewed. No pertinent past medical history.    PAST SURGICAL HISTORY:   Past Surgical History:   Procedure Laterality Date     ABDOMEN SURGERY      hernia surgery X5     BIOPSY BREAST NEEDLE LOCALIZATION Left 1/30/2017    Procedure: BIOPSY BREAST NEEDLE LOCALIZATION;  Surgeon: Sita Rushing MD;  Location: [facility] OR     BREAST SURGERY Left 09/28/2016    biopsy     BREAST SURGERY Left 09/13/2016    biopsy     COLONOSCOPY N/A 2/18/2021    Procedure: COLONOSCOPY, WITH POLYPECTOMY AND BIOPSY, and hemorrhoid banding;  Surgeon: Naeem Camarena MD;  Location: [facility] OR     GYN SURGERY  2004     "tubal ligation     ORTHOPEDIC SURGERY Left [id number]    left knee medial retinacular repair, VMO advancement, and reapir of partial tear of quadriceps tendon     ORTHOPEDIC SURGERY Left 04/18/2017    left knee manipulation       FAMILY HISTORY:   Family History   Problem Relation Age of Onset     Other - See Comments Mother         back surgery     Other Cancer Father         skin cancer     Hypertension Father      Gastrointestinal Disease Father         Diverticulosis       SOCIAL HISTORY:   Social History     Tobacco Use     Smoking status: Current Every Day Smoker     Packs/day: 1.00     Years: 20.00     Pack years: 20.00     Types: Cigarettes     Smokeless tobacco: Never Used   Substance Use Topics     Alcohol use: Not Currently     Alcohol/week: 5.0 standard drinks     Types: 5 Cans of beer per week     Comment:  weekly         Allergies   Allergen Reactions     Peanuts [Peanut-Derived]      Eyes swell     Trees Other (See Comments)     Tree sap makes her eyes swell shut     Current Outpatient Medications   Medication     SUMAtriptan (IMITREX) 50 MG tablet     topiramate (TOPAMAX) 25 MG tablet     No current facility-administered medications for this visit.         Review of Systems   Per HPI        Objective    /82   Pulse 62   Temp 97.4  F (36.3  C)   Resp 14   Ht 1.727 m (5' 8\")   Wt 87.3 kg (192 lb 6.4 oz)   LMP 06/14/2022   SpO2 98%   Breastfeeding No   BMI 29.25 kg/m    Body mass index is 29.25 kg/m .  Physical Exam   General: Pleasant, in no apparent distress.  Cardiovascular: Regular rate and rhythm with S1 equal to S2. No murmurs, friction rubs, or gallops.   Respiratory: Lungs are resonant and clear to auscultation bilaterally. No wheezes, crackles, or rhonchi.  Psych: Appropriate mood and affect.          "

## 2022-09-14 ENCOUNTER — OFFICE VISIT (OUTPATIENT)
Dept: FAMILY MEDICINE | Facility: OTHER | Age: 47
End: 2022-09-14
Attending: PHYSICIAN ASSISTANT
Payer: COMMERCIAL

## 2022-09-14 VITALS
HEART RATE: 62 BPM | DIASTOLIC BLOOD PRESSURE: 82 MMHG | TEMPERATURE: 97.4 F | OXYGEN SATURATION: 98 % | HEIGHT: 68 IN | RESPIRATION RATE: 14 BRPM | SYSTOLIC BLOOD PRESSURE: 138 MMHG | BODY MASS INDEX: 29.16 KG/M2 | WEIGHT: 192.4 LBS

## 2022-09-14 DIAGNOSIS — G43.809 OTHER MIGRAINE WITHOUT STATUS MIGRAINOSUS, NOT INTRACTABLE: Primary | ICD-10-CM

## 2022-09-14 PROCEDURE — G0463 HOSPITAL OUTPT CLINIC VISIT: HCPCS | Mod: 25

## 2022-09-14 PROCEDURE — G0463 HOSPITAL OUTPT CLINIC VISIT: HCPCS

## 2022-09-14 PROCEDURE — 96372 THER/PROPH/DIAG INJ SC/IM: CPT | Performed by: PHYSICIAN ASSISTANT

## 2022-09-14 PROCEDURE — 99214 OFFICE O/P EST MOD 30 MIN: CPT | Performed by: PHYSICIAN ASSISTANT

## 2022-09-14 PROCEDURE — 250N000011 HC RX IP 250 OP 636: Performed by: PHYSICIAN ASSISTANT

## 2022-09-14 PROCEDURE — 250N000009 HC RX 250: Performed by: PHYSICIAN ASSISTANT

## 2022-09-14 RX ORDER — KETOROLAC TROMETHAMINE 30 MG/ML
60 INJECTION, SOLUTION INTRAMUSCULAR; INTRAVENOUS ONCE
Status: COMPLETED | OUTPATIENT
Start: 2022-09-14 | End: 2022-09-14

## 2022-09-14 RX ORDER — DEXAMETHASONE SODIUM PHOSPHATE 4 MG/ML
10 VIAL (ML) INJECTION ONCE
Status: COMPLETED | OUTPATIENT
Start: 2022-09-14 | End: 2022-09-14

## 2022-09-14 RX ORDER — SUMATRIPTAN 50 MG/1
50 TABLET, FILM COATED ORAL
Qty: 27 TABLET | Refills: 0 | Status: SHIPPED | OUTPATIENT
Start: 2022-09-14 | End: 2022-10-26

## 2022-09-14 RX ADMIN — KETOROLAC TROMETHAMINE 60 MG: 30 INJECTION, SOLUTION INTRAMUSCULAR at 08:51

## 2022-09-14 RX ADMIN — DEXAMETHASONE SODIUM PHOSPHATE 10 MG: 4 INJECTION, SOLUTION INTRAMUSCULAR; INTRAVENOUS at 08:50

## 2022-09-14 ASSESSMENT — ANXIETY QUESTIONNAIRES
GAD7 TOTAL SCORE: 2
2. NOT BEING ABLE TO STOP OR CONTROL WORRYING: NOT AT ALL
GAD7 TOTAL SCORE: 2
IF YOU CHECKED OFF ANY PROBLEMS ON THIS QUESTIONNAIRE, HOW DIFFICULT HAVE THESE PROBLEMS MADE IT FOR YOU TO DO YOUR WORK, TAKE CARE OF THINGS AT HOME, OR GET ALONG WITH OTHER PEOPLE: NOT DIFFICULT AT ALL
7. FEELING AFRAID AS IF SOMETHING AWFUL MIGHT HAPPEN: NOT AT ALL
1. FEELING NERVOUS, ANXIOUS, OR ON EDGE: SEVERAL DAYS
8. IF YOU CHECKED OFF ANY PROBLEMS, HOW DIFFICULT HAVE THESE MADE IT FOR YOU TO DO YOUR WORK, TAKE CARE OF THINGS AT HOME, OR GET ALONG WITH OTHER PEOPLE?: NOT DIFFICULT AT ALL
4. TROUBLE RELAXING: NOT AT ALL
7. FEELING AFRAID AS IF SOMETHING AWFUL MIGHT HAPPEN: NOT AT ALL
6. BECOMING EASILY ANNOYED OR IRRITABLE: SEVERAL DAYS
5. BEING SO RESTLESS THAT IT IS HARD TO SIT STILL: NOT AT ALL
GAD7 TOTAL SCORE: 2
3. WORRYING TOO MUCH ABOUT DIFFERENT THINGS: NOT AT ALL

## 2022-09-14 ASSESSMENT — PATIENT HEALTH QUESTIONNAIRE - PHQ9
SUM OF ALL RESPONSES TO PHQ QUESTIONS 1-9: 0
10. IF YOU CHECKED OFF ANY PROBLEMS, HOW DIFFICULT HAVE THESE PROBLEMS MADE IT FOR YOU TO DO YOUR WORK, TAKE CARE OF THINGS AT HOME, OR GET ALONG WITH OTHER PEOPLE: NOT DIFFICULT AT ALL
SUM OF ALL RESPONSES TO PHQ QUESTIONS 1-9: 0

## 2022-09-14 ASSESSMENT — PAIN SCALES - GENERAL: PAINLEVEL: MODERATE PAIN (4)

## 2022-09-14 NOTE — NURSING NOTE
Patient presents to clinic with migraines. She states that she has been having them for the past 6 weeks. She does have a headache today.  Brandi Najera LPN ....................  9/14/2022   8:14 AM

## 2022-10-25 NOTE — PROGRESS NOTES
"  Assessment & Plan     1. Other migraine without status migrainosus, not intractable  Significant improvement in migraines with addition of as needed Imitrex along with daily Topamax.  Refilled both as below.  Follow-up as needed.  - SUMAtriptan (IMITREX) 50 MG tablet; Take 1 tablet (50 mg) by mouth at onset of headache for migraine May repeat in 2 hours. Max 4 tablets/24 hours.  Dispense: 30 tablet; Refill: 3  - topiramate (TOPAMAX) 25 MG tablet; Take 1 tablet in morning and 2 tablets in evening  Dispense: 90 tablet; Refill: 3    2. Encounter for screening mammogram for breast cancer  - MA Screen Bilateral w/Mamadou; Future    3. Eye twitch  Likely related to recent increase in stress.  Continue to monitor, follow-up as needed.      Return if symptoms worsen or fail to improve.    Omayra Sousa PA-C  [facility]    Oliver Carranza is a 47 year old presenting for the following health issues:  Refill Request      History of Present Illness       Headaches:   Since the patient's last clinic visit, headaches are: no change  The patient is getting headaches:  1 to 2 a week  She is not able to do normal daily activities when she has a migraine.  The patient is taking the following rescue/relief medications:  Excedrin, sumatriptan (Imitrex) and other   Patient states \"I get total relief\" from the rescue/relief medications.   The patient is taking the following medications to prevent migraines:  Topomax  In the past 4 weeks, the patient has gone to an Urgent Care or Emergency Room 0 times times due to headaches.    She eats 2-3 servings of fruits and vegetables daily.She consumes 2 sweetened beverage(s) daily.She exercises with enough effort to increase her heart rate 30 to 60 minutes per day.  She exercises with enough effort to increase her heart rate 7 days per week.   She is taking medications regularly.       Here for follow up on migraines.  Patient was recently started on as needed Imitrex " in addition to continue Topamax daily.  Has noticed improvement in management of her migraines.  Reports significant improvement with Imitrex.  Will get complete relief within 2 hours of taking the Imitrex.  She would like refills of both the Imitrex and Topamax going forward.  She also reports more recently she has noticed a twitch in her right eye.  She is wondering if this could be related to recent stress at home and work.  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    Due for mammogram screening.    PAST MEDICAL HISTORY: History reviewed. No pertinent past medical history.    PAST SURGICAL HISTORY:   Past Surgical History:   Procedure Laterality Date     ABDOMEN SURGERY      hernia surgery X5     BIOPSY BREAST NEEDLE LOCALIZATION Left 1/30/2017    Procedure: BIOPSY BREAST NEEDLE LOCALIZATION;  Surgeon: Sita Rushing MD;  Location: [facility]     BREAST SURGERY Left 09/28/2016    biopsy     BREAST SURGERY Left 09/13/2016    biopsy     COLONOSCOPY N/A 2/18/2021    Procedure: COLONOSCOPY, WITH POLYPECTOMY AND BIOPSY, and hemorrhoid banding;  Surgeon: Naeem Camarena MD;  Location:  OR     GYN SURGERY  2004    tubal ligation     ORTHOPEDIC SURGERY Left [id number]    left knee medial retinacular repair, VMO advancement, and reapir of partial tear of quadriceps tendon     ORTHOPEDIC SURGERY Left 04/18/2017    left knee manipulation       FAMILY HISTORY:   Family History   Problem Relation Age of Onset     Other - See Comments Mother         back surgery     Other Cancer Father         skin cancer     Hypertension Father      Gastrointestinal Disease Father         Diverticulosis       SOCIAL HISTORY:   Social History     Tobacco Use     Smoking status: Every Day     Packs/day: 1.00     Years: 20.00     Pack years: 20.00     Types: Cigarettes     Smokeless tobacco: Never   Substance Use Topics     Alcohol  "use: Not Currently     Alcohol/week: 5.0 standard drinks     Types: 5 Cans of beer per week     Comment:  weekly         Allergies   Allergen Reactions     Peanuts [Peanut-Derived]      Eyes swell     Trees Other (See Comments)     Tree sap makes her eyes swell shut     Current Outpatient Medications   Medication     SUMAtriptan (IMITREX) 50 MG tablet     topiramate (TOPAMAX) 25 MG tablet     No current facility-administered medications for this visit.         Review of Systems   Per HPI        Objective    /72   Pulse 74   Temp 98.6  F (37  C)   Resp 12   Ht 1.727 m (5' 8\")   Wt 84 kg (185 lb 3.2 oz)   SpO2 98%   BMI 28.16 kg/m    Body mass index is 28.16 kg/m .  Physical Exam   General: Pleasant, in no apparent distress.  Eyes: Sclera are white and conjunctiva are clear bilaterally. Lacrimal apparatus free of erythema, edema, and discharge bilaterally. No visible twitching  Psych: Appropriate mood and affect.      "

## 2022-10-26 ENCOUNTER — OFFICE VISIT (OUTPATIENT)
Dept: FAMILY MEDICINE | Facility: OTHER | Age: 47
End: 2022-10-26
Attending: PHYSICIAN ASSISTANT
Payer: COMMERCIAL

## 2022-10-26 VITALS
RESPIRATION RATE: 12 BRPM | BODY MASS INDEX: 28.07 KG/M2 | HEIGHT: 68 IN | DIASTOLIC BLOOD PRESSURE: 72 MMHG | WEIGHT: 185.2 LBS | HEART RATE: 74 BPM | TEMPERATURE: 98.6 F | OXYGEN SATURATION: 98 % | SYSTOLIC BLOOD PRESSURE: 128 MMHG

## 2022-10-26 DIAGNOSIS — Z12.31 ENCOUNTER FOR SCREENING MAMMOGRAM FOR BREAST CANCER: ICD-10-CM

## 2022-10-26 DIAGNOSIS — G24.5 EYE TWITCH: ICD-10-CM

## 2022-10-26 DIAGNOSIS — G43.809 OTHER MIGRAINE WITHOUT STATUS MIGRAINOSUS, NOT INTRACTABLE: Primary | ICD-10-CM

## 2022-10-26 PROCEDURE — 99213 OFFICE O/P EST LOW 20 MIN: CPT | Performed by: PHYSICIAN ASSISTANT

## 2022-10-26 PROCEDURE — G0463 HOSPITAL OUTPT CLINIC VISIT: HCPCS | Performed by: PHYSICIAN ASSISTANT

## 2022-10-26 RX ORDER — TOPIRAMATE 25 MG/1
TABLET, FILM COATED ORAL
Qty: 90 TABLET | Refills: 3 | Status: SHIPPED | OUTPATIENT
Start: 2022-10-26 | End: 2023-11-09 | Stop reason: ALTCHOICE

## 2022-10-26 RX ORDER — SUMATRIPTAN 50 MG/1
50 TABLET, FILM COATED ORAL
Qty: 30 TABLET | Refills: 3 | Status: SHIPPED | OUTPATIENT
Start: 2022-10-26 | End: 2024-04-08

## 2022-10-26 ASSESSMENT — PAIN SCALES - GENERAL: PAINLEVEL: MILD PAIN (3)

## 2022-10-26 NOTE — NURSING NOTE
Patient presents to clinic for medication refill of her migraine medication. She states that it has been working about an hour and a half after taking it. 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  Brandi Najera LPN ....................  10/26/2022   10:32 AM

## 2022-10-28 ENCOUNTER — HOSPITAL ENCOUNTER (OUTPATIENT)
Dept: MAMMOGRAPHY | Facility: OTHER | Age: 47
Discharge: HOME | End: 2022-10-28
Attending: PHYSICIAN ASSISTANT | Admitting: PHYSICIAN ASSISTANT
Payer: COMMERCIAL

## 2022-10-28 DIAGNOSIS — Z12.31 ENCOUNTER FOR SCREENING MAMMOGRAM FOR BREAST CANCER: ICD-10-CM

## 2022-10-28 PROCEDURE — 77067 SCR MAMMO BI INCL CAD: CPT

## 2022-11-10 ENCOUNTER — HOSPITAL ENCOUNTER (EMERGENCY)
Facility: OTHER | Age: 47
Discharge: HOME | End: 2022-11-10
Payer: COMMERCIAL

## 2022-11-10 VITALS
DIASTOLIC BLOOD PRESSURE: 62 MMHG | HEART RATE: 75 BPM | TEMPERATURE: 97.6 F | RESPIRATION RATE: 20 BRPM | SYSTOLIC BLOOD PRESSURE: 129 MMHG

## 2022-11-10 NOTE — ED TRIAGE NOTES
Patient presents to ER with CO migraine for the past 5 days. Imitrex 4am, 9am, Toradol 9am. Unable to sleep some nights. Left work due to pain. /62   Pulse 75   Temp 97.6  F (36.4  C) (Temporal)   Resp 20

## 2022-11-10 NOTE — ED NOTES
"Pt reports that she is upset because \"all I need is a GI cocktail\", pt walked out and stated that she was leaving  "

## 2023-01-02 ENCOUNTER — OFFICE VISIT (OUTPATIENT)
Dept: FAMILY MEDICINE | Facility: OTHER | Age: 48
End: 2023-01-02
Attending: NURSE PRACTITIONER
Payer: COMMERCIAL

## 2023-01-02 ENCOUNTER — HOSPITAL ENCOUNTER (OUTPATIENT)
Dept: GENERAL RADIOLOGY | Facility: OTHER | Age: 48
Discharge: HOME | End: 2023-01-02
Attending: STUDENT IN AN ORGANIZED HEALTH CARE EDUCATION/TRAINING PROGRAM
Payer: COMMERCIAL

## 2023-01-02 VITALS
OXYGEN SATURATION: 97 % | HEIGHT: 68 IN | TEMPERATURE: 98.9 F | HEART RATE: 66 BPM | SYSTOLIC BLOOD PRESSURE: 126 MMHG | BODY MASS INDEX: 28.79 KG/M2 | WEIGHT: 190 LBS | DIASTOLIC BLOOD PRESSURE: 64 MMHG | RESPIRATION RATE: 20 BRPM

## 2023-01-02 DIAGNOSIS — M25.571 PAIN IN JOINT INVOLVING ANKLE AND FOOT, RIGHT: Primary | ICD-10-CM

## 2023-01-02 DIAGNOSIS — M25.571 PAIN IN JOINT INVOLVING ANKLE AND FOOT, RIGHT: ICD-10-CM

## 2023-01-02 PROCEDURE — G0463 HOSPITAL OUTPT CLINIC VISIT: HCPCS

## 2023-01-02 PROCEDURE — 99213 OFFICE O/P EST LOW 20 MIN: CPT | Performed by: STUDENT IN AN ORGANIZED HEALTH CARE EDUCATION/TRAINING PROGRAM

## 2023-01-02 PROCEDURE — 73610 X-RAY EXAM OF ANKLE: CPT | Mod: RT

## 2023-01-02 RX ORDER — MELOXICAM 15 MG/1
15 TABLET ORAL DAILY
Qty: 30 TABLET | Refills: 1 | Status: SHIPPED | OUTPATIENT
Start: 2023-01-02 | End: 2023-02-13

## 2023-01-02 ASSESSMENT — PAIN SCALES - GENERAL: PAINLEVEL: EXTREME PAIN (9)

## 2023-01-02 NOTE — PROGRESS NOTES
Chief Complaint   Patient presents with     Musculoskeletal Problem     X 2 days     Patient cannot relate pain to an injury. Felt a little pop/snap a couple of days ago - pain has persisted since when bearing weight.  Pt. treating with ibuprofen, tylenol, ice and compression wrap.    Advanced Care Planning on file? no    Medication Review Completed: complete    FOOD SECURITY SCREENING QUESTIONS:    The next two questions are to help us understand your food security.  If you are feeling you need any assistance in this area, we have resources available to support you today.    Hunger Vital Signs:  Within the past 12 months we worried whether our food would run out before we got money to buy more. Never  Within the past 12 months the food we bought just didn't last and we didn't have money to get more. Never    Sharonda Elkins LPN

## 2023-01-02 NOTE — PROGRESS NOTES
"Ms. Camarena is a 47 year old female who presents to the clinic for right ankle pain    HPI    Patient reports that she had a pop/snap in her ankle a few days ago and the pain has persisted particular when bearing weight or turning on her ankle.  She has used ibuprofen Tylenol and ice and compression wraps at home.  She had no injury to the ankle at the time.  She has no fever or chills.  There is no warmth or effusion of the ankle.  She states that due to the pain she was bearing weight at 1 point and slipped and fell no other injuries.    Review of Systems     Reviewed and updated as needed this visit by Provider                   EXAM:   Vitals:    01/02/23 1223   BP: 126/64   BP Location: Left arm   Patient Position: Sitting   Cuff Size: Adult Regular   Pulse: 66   Resp: 20   Temp: 98.9  F (37.2  C)   TempSrc: Tympanic   SpO2: 97%   Weight: 86.2 kg (190 lb)   Height: 1.727 m (5' 8\")         BP Readings from Last 3 Encounters:   01/02/23 126/64   10/26/22 128/72   09/14/22 138/82      Wt Readings from Last 3 Encounters:   01/02/23 86.2 kg (190 lb)   10/26/22 84 kg (185 lb 3.2 oz)   09/14/22 87.3 kg (192 lb 6.4 oz)      Estimated body mass index is 28.89 kg/m  as calculated from the following:    Height as of this encounter: 1.727 m (5' 8\").    Weight as of this encounter: 86.2 kg (190 lb).     Physical Exam   General: Pleasant 47-year-old woman sitting clinic no acute distress  MSK: Ligaments of the foot seem to be intact no gross abnormality intact flexion and extension of the ankle without patient pain.  Pain is deep palpation of the metatarsals.  No effusion or redness.  No warmth    INVESTIGATIONS:  -Reviewed foot x-ray with patient in clinic today    ASSESSMENT AND PLAN:    ICD-10-CM    1. Pain in joint involving ankle and foot, right  M25.571 XR Ankle Right G/E 3 Views     meloxicam (MOBIC) 15 MG tablet          Assessment/Plan:     I am unsure exactly what is causing her ankle pain she may have had a mild " sprain.  Anatomic alignment appears intact and no fracture.  She was given a walking boot to help be supportive of her ankle while she is working to allow her to continue to work.  She declined physical therapy today.  She will take acetaminophen and use heat and ice and was given a prescription for meloxicam for pain management.  If her pain does not improve the next few weeks she will return to PCP in clinic and may need MRI of her ankle.      Follow-up with PCP if ankle pain not improving in 1 to 2 weeks      Electronically signed by:  Peña Agosto MD on 1/2/2023  Internal Medicine  [facility]

## 2023-01-13 NOTE — PROGRESS NOTES
Assessment & Plan     1. Pain in joint involving ankle and foot, right  Persistent right ankle pain for the past few weeks without any improvement.  Significant pain to light touch noted on exam.  Discussed options including continued conservative management with walking boot, formal physical therapy, additional evaluation.  Patient requesting to pursue additional evaluation with MRI.  Order placed.  Will notify with results and adjust treatment plan if indicated.  - MR Ankle Right w/o Contrast; Future    Patient is due for physical with Pap smear.  She will schedule her convenience.    Return if symptoms worsen or fail to improve.    Omayra Sousa PA-C  [facility]    Subjective   Tere is a 47 year old, presenting for the following health issues:  Ankle Pain (right)      History of Present Illness       Reason for visit:  Ankle pain  Symptom onset:  1-2 weeks ago  Symptoms include:  Ankle pain  Symptom intensity:  Moderate  Symptom progression:  Staying the same  Had these symptoms before:  No  What makes it worse:  Pivoting  What makes it better:  Resting    She eats 2-3 servings of fruits and vegetables daily.She consumes 6 sweetened beverage(s) daily.She exercises with enough effort to increase her heart rate 30 to 60 minutes per day.  She exercises with enough effort to increase her heart rate 5 days per week.   She is taking medications regularly.     Here for follow-up on right ankle.  Patient was initially seen in the rapid clinic on 1/2/2023 for evaluation of right ankle pain with no known injury.  X-ray at that time was largely unrevealing.  Was given a walking boot for symptomatic management to be able to maintain work.  Has continued to struggle with right ankle pain.  Seems to be more localized to the medial aspect.  Managing symptoms with meloxicam and Tylenol with minimal improvement.  Pain is waking her from sleep.  Worse with pivoting.  No overlying skin changes,  numbness or tingling, weakness.    PAST MEDICAL HISTORY: History reviewed. No pertinent past medical history.    PAST SURGICAL HISTORY:   Past Surgical History:   Procedure Laterality Date     ABDOMEN SURGERY      hernia surgery X5     BIOPSY BREAST NEEDLE LOCALIZATION Left 1/30/2017    Procedure: BIOPSY BREAST NEEDLE LOCALIZATION;  Surgeon: Sita Rushing MD;  Location: [facility] OR     BREAST SURGERY Left 09/28/2016    biopsy     BREAST SURGERY Left 09/13/2016    biopsy     COLONOSCOPY N/A 2/18/2021    Procedure: COLONOSCOPY, WITH POLYPECTOMY AND BIOPSY, and hemorrhoid banding;  Surgeon: Naeem Camarena MD;  Location: [facility] OR     GYN SURGERY  2004    tubal ligation     ORTHOPEDIC SURGERY Left [id number]    left knee medial retinacular repair, VMO advancement, and reapir of partial tear of quadriceps tendon     ORTHOPEDIC SURGERY Left 04/18/2017    left knee manipulation       FAMILY HISTORY:   Family History   Problem Relation Age of Onset     Other - See Comments Mother         back surgery     Other Cancer Father         skin cancer     Hypertension Father      Gastrointestinal Disease Father         Diverticulosis       SOCIAL HISTORY:   Social History     Tobacco Use     Smoking status: Every Day     Packs/day: 1.00     Years: 20.00     Pack years: 20.00     Types: Cigarettes     Smokeless tobacco: Never   Substance Use Topics     Alcohol use: Not Currently     Alcohol/week: 5.0 standard drinks     Types: 5 Cans of beer per week     Comment:  weekly         Allergies   Allergen Reactions     Peanuts [Peanut-Derived]      Eyes swell     Trees Other (See Comments)     Tree sap makes her eyes swell shut     Current Outpatient Medications   Medication     meloxicam (MOBIC) 15 MG tablet     topiramate (TOPAMAX) 25 MG tablet     SUMAtriptan (IMITREX) 50 MG tablet     No current facility-administered medications for this visit.         Review of Systems   Per HPI        Objective    /68   Pulse 58   Temp 98.8  F  (37.1  C) (Tympanic)   Resp 18   Wt 87.2 kg (192 lb 3.2 oz)   LMP 12/23/2022 (Approximate)   SpO2 98%   Breastfeeding No   BMI 29.22 kg/m    Body mass index is 29.22 kg/m .  Physical Exam   General: Pleasant, in no apparent distress.  Musculoskeletal: Significant tenderness palpation over right medial ankle.  Tenderness over posterior medial malleolus extending underneath and towards midfoot.  Pain is significant with very light touch.  Full range of motion of ankle, pain elicited with inversion and eversion.  Neurologic Exam: Normal gross motor, tone coordination and no visible tremor.  Skin: No jaundice, pallor, rashes, or lesions.  Psych: Appropriate mood and affect.

## 2023-01-16 ENCOUNTER — OFFICE VISIT (OUTPATIENT)
Dept: FAMILY MEDICINE | Facility: OTHER | Age: 48
End: 2023-01-16
Attending: PHYSICIAN ASSISTANT
Payer: COMMERCIAL

## 2023-01-16 VITALS
DIASTOLIC BLOOD PRESSURE: 68 MMHG | RESPIRATION RATE: 18 BRPM | BODY MASS INDEX: 29.22 KG/M2 | OXYGEN SATURATION: 98 % | SYSTOLIC BLOOD PRESSURE: 110 MMHG | HEART RATE: 58 BPM | TEMPERATURE: 98.8 F | WEIGHT: 192.2 LBS

## 2023-01-16 DIAGNOSIS — M25.571 PAIN IN JOINT INVOLVING ANKLE AND FOOT, RIGHT: Primary | ICD-10-CM

## 2023-01-16 PROCEDURE — G0463 HOSPITAL OUTPT CLINIC VISIT: HCPCS

## 2023-01-16 PROCEDURE — 99213 OFFICE O/P EST LOW 20 MIN: CPT | Performed by: PHYSICIAN ASSISTANT

## 2023-01-16 ASSESSMENT — PAIN SCALES - GENERAL: PAINLEVEL: SEVERE PAIN (7)

## 2023-01-16 NOTE — NURSING NOTE
Patient is here for right ankle pain, states has been ongoing for a couple weeks. Still having a lot of pain.     Patient's last menstrual period was 12/23/2022 (approximate).  Medication Reconciliation: complete      Mag Lane LPN 1/16/2023 2:02 PM       Advance care directive on file? no  Advance care directive provided to patient? no       Mag Lane LPN

## 2023-01-18 ENCOUNTER — HOSPITAL ENCOUNTER (OUTPATIENT)
Dept: MRI IMAGING | Facility: OTHER | Age: 48
Discharge: HOME | End: 2023-01-18
Attending: PHYSICIAN ASSISTANT | Admitting: PHYSICIAN ASSISTANT
Payer: COMMERCIAL

## 2023-01-18 DIAGNOSIS — M25.571 PAIN IN JOINT INVOLVING ANKLE AND FOOT, RIGHT: ICD-10-CM

## 2023-01-18 PROCEDURE — 73721 MRI JNT OF LWR EXTRE W/O DYE: CPT | Mod: RT

## 2023-01-19 ENCOUNTER — MYC MEDICAL ADVICE (OUTPATIENT)
Dept: FAMILY MEDICINE | Facility: OTHER | Age: 48
End: 2023-01-19
Payer: COMMERCIAL

## 2023-01-19 DIAGNOSIS — M89.9 OSTEOCHONDRAL LESION: ICD-10-CM

## 2023-01-19 DIAGNOSIS — M94.9 OSTEOCHONDRAL LESION: ICD-10-CM

## 2023-01-19 DIAGNOSIS — M25.571 PAIN IN JOINT INVOLVING ANKLE AND FOOT, RIGHT: Primary | ICD-10-CM

## 2023-01-23 RX ORDER — KETOROLAC TROMETHAMINE 10 MG/1
10 TABLET, FILM COATED ORAL EVERY 6 HOURS PRN
Qty: 20 TABLET | Refills: 0 | Status: SHIPPED | OUTPATIENT
Start: 2023-01-23 | End: 2023-02-13

## 2023-01-23 NOTE — TELEPHONE ENCOUNTER
FYI:  Patient would like toradol sent in today. Patient was not very pleasant nor happy on the phone. Very loud.  Brandi Najera LPN ....................  1/23/2023   2:27 PM

## 2023-02-09 ENCOUNTER — OFFICE VISIT (OUTPATIENT)
Dept: ORTHOPEDICS | Facility: OTHER | Age: 48
End: 2023-02-09
Attending: PHYSICIAN ASSISTANT
Payer: COMMERCIAL

## 2023-02-09 VITALS — HEART RATE: 75 BPM | BODY MASS INDEX: 28.43 KG/M2 | WEIGHT: 187 LBS | OXYGEN SATURATION: 98 %

## 2023-02-09 DIAGNOSIS — M25.571 PAIN IN JOINT INVOLVING ANKLE AND FOOT, RIGHT: ICD-10-CM

## 2023-02-09 DIAGNOSIS — M89.9 OSTEOCHONDRAL LESION: ICD-10-CM

## 2023-02-09 DIAGNOSIS — M94.9 OSTEOCHONDRAL LESION: ICD-10-CM

## 2023-02-09 PROCEDURE — 99203 OFFICE O/P NEW LOW 30 MIN: CPT | Performed by: PODIATRIST

## 2023-02-09 PROCEDURE — G0463 HOSPITAL OUTPT CLINIC VISIT: HCPCS

## 2023-02-09 ASSESSMENT — PAIN SCALES - GENERAL: PAINLEVEL: MODERATE PAIN (5)

## 2023-02-09 NOTE — PROGRESS NOTES
Visit Date: 02/09/2023    HISTORY OF PRESENT ILLNESS: Tere is a 47-year-old here to see me regarding right ankle pain.  She has had some chronic issues with instability here.  Has had some sprains and issues in the past.  In the end of December, she cracked it.  Her expansion was never corrected back or the pain never subsided after as it normally does, and she has had significant pain since, has been hobbling around in a boot.  MRI shows a large osteochondral lesion, lateral ligament pathology.    HISTORY REVIEW:  I have reviewed this patient's past medical history, family history, social history as well as medications and allergies.  Any changes/additions were appropriately charted in the patient's electronic medical record.      PHYSICAL EXAMINATION:   CONSTITUTIONAL:  The patient is alert and oriented x3, well appearing and in no apparent distress.  Affect is pleasant and answers questions appropriately.  VASCULAR:  Circulation is intact with palpable pedal pulses and adequate capillary fill time to all digits.  Hair growth is present and appropriate to mid foot and digits.  NEUROLOGIC:  Light touch sensation is intact to digits.  There is a negative Tinel sign.  There are no signs of apparent nerve entrapment of superficial peroneal or common peroneal nerves.  INTEGUMENT:  No abnormal dermatologic lesions are noted.  Skin has normal texture and turgor.    MUSCULOSKELETAL:  Laxity of the ankle is appreciated.  She has a positive drawer test with significant instability.  Tender to palpate primarily anterior medial, which is consistent with location of osteochondral lesion, likely has a loose body.    IMAGING:  I did review MRI findings as suggested in HPI, significant lateral ligament pathology osteochondral lesion about 1 cm x 1 cm in size.    ASSESSMENT AND PLAN:  I discussed condition and treatment with the patient today.  We discussed the pathology.  She is quite symptomatic and unstable.  I discussed  surgery to consist of ankle scope, OCD management, debridement, repair with BioCartilage.  She is at a centimeter lesion.  She is kind of at a junctional here of appropriate treatment of just marrow stimulation versus a larger structural graft.  We are going to try the marrow stimulation with BioCartilage which should not burn any bridges and leave possibilities open for down the road in addition to lateral ligament repair.  I discussed this in detail including surgery, recovery, risks, potential complications, alternatives and benefits.  Schedule at her convenience.  Followup accordingly postop.    Thirty-minute consultation.    Jose David Sibley DPM        D: 2023   T: 2023   MT: KRISTIE    Name:     JAY MORALES  MRN:      [id number]        Account:    [id number]   :      1975           Visit Date: 2023     Document: [id number]

## 2023-02-09 NOTE — PROGRESS NOTES
Patient is here for consult on her right foot and ankle pain.  Mendy Dunbar LPN .....................2/9/2023 1:42 PM

## 2023-02-14 ENCOUNTER — OFFICE VISIT (OUTPATIENT)
Dept: FAMILY MEDICINE | Facility: OTHER | Age: 48
End: 2023-02-14
Attending: PHYSICIAN ASSISTANT
Payer: COMMERCIAL

## 2023-02-14 VITALS
DIASTOLIC BLOOD PRESSURE: 72 MMHG | HEART RATE: 68 BPM | OXYGEN SATURATION: 98 % | SYSTOLIC BLOOD PRESSURE: 110 MMHG | WEIGHT: 186.2 LBS | BODY MASS INDEX: 28.22 KG/M2 | TEMPERATURE: 98.1 F | HEIGHT: 68 IN | RESPIRATION RATE: 16 BRPM

## 2023-02-14 DIAGNOSIS — Z01.818 PREOP GENERAL PHYSICAL EXAM: Primary | ICD-10-CM

## 2023-02-14 LAB
ANION GAP SERPL CALCULATED.3IONS-SCNC: 8 MMOL/L (ref 7–15)
BASOPHILS # BLD AUTO: 0 10E3/UL (ref 0–0.2)
BASOPHILS NFR BLD AUTO: 0 %
BUN SERPL-MCNC: 10.5 MG/DL (ref 6–20)
CALCIUM SERPL-MCNC: 9.2 MG/DL (ref 8.6–10)
CHLORIDE SERPL-SCNC: 109 MMOL/L (ref 98–107)
CREAT SERPL-MCNC: 0.7 MG/DL (ref 0.51–0.95)
DEPRECATED HCO3 PLAS-SCNC: 22 MMOL/L (ref 22–29)
EOSINOPHIL # BLD AUTO: 0.2 10E3/UL (ref 0–0.7)
EOSINOPHIL NFR BLD AUTO: 2 %
ERYTHROCYTE [DISTWIDTH] IN BLOOD BY AUTOMATED COUNT: 13.2 % (ref 10–15)
GFR SERPL CREATININE-BSD FRML MDRD: >90 ML/MIN/1.73M2
GLUCOSE SERPL-MCNC: 93 MG/DL (ref 70–99)
HCT VFR BLD AUTO: 42.4 % (ref 35–47)
HGB BLD-MCNC: 14.8 G/DL (ref 11.7–15.7)
HOLD SPECIMEN: NORMAL
IMM GRANULOCYTES # BLD: 0 10E3/UL
IMM GRANULOCYTES NFR BLD: 0 %
LYMPHOCYTES # BLD AUTO: 2.2 10E3/UL (ref 0.8–5.3)
LYMPHOCYTES NFR BLD AUTO: 22 %
MCH RBC QN AUTO: 34.5 PG (ref 26.5–33)
MCHC RBC AUTO-ENTMCNC: 34.9 G/DL (ref 31.5–36.5)
MCV RBC AUTO: 99 FL (ref 78–100)
MONOCYTES # BLD AUTO: 0.6 10E3/UL (ref 0–1.3)
MONOCYTES NFR BLD AUTO: 6 %
NEUTROPHILS # BLD AUTO: 7 10E3/UL (ref 1.6–8.3)
NEUTROPHILS NFR BLD AUTO: 70 %
NRBC # BLD AUTO: 0 10E3/UL
NRBC BLD AUTO-RTO: 0 /100
PLATELET # BLD AUTO: 193 10E3/UL (ref 150–450)
POTASSIUM SERPL-SCNC: 3.9 MMOL/L (ref 3.4–5.3)
RBC # BLD AUTO: 4.29 10E6/UL (ref 3.8–5.2)
SODIUM SERPL-SCNC: 139 MMOL/L (ref 136–145)
WBC # BLD AUTO: 10.1 10E3/UL (ref 4–11)

## 2023-02-14 PROCEDURE — 93005 ELECTROCARDIOGRAM TRACING: CPT | Performed by: PHYSICIAN ASSISTANT

## 2023-02-14 PROCEDURE — 93010 ELECTROCARDIOGRAM REPORT: CPT | Performed by: INTERNAL MEDICINE

## 2023-02-14 PROCEDURE — 99214 OFFICE O/P EST MOD 30 MIN: CPT | Performed by: PHYSICIAN ASSISTANT

## 2023-02-14 PROCEDURE — 36415 COLL VENOUS BLD VENIPUNCTURE: CPT | Mod: ZL | Performed by: PHYSICIAN ASSISTANT

## 2023-02-14 PROCEDURE — 85025 COMPLETE CBC W/AUTO DIFF WBC: CPT | Mod: ZL | Performed by: PHYSICIAN ASSISTANT

## 2023-02-14 PROCEDURE — 80048 BASIC METABOLIC PNL TOTAL CA: CPT | Mod: ZL | Performed by: PHYSICIAN ASSISTANT

## 2023-02-14 PROCEDURE — G0463 HOSPITAL OUTPT CLINIC VISIT: HCPCS

## 2023-02-14 ASSESSMENT — PAIN SCALES - GENERAL: PAINLEVEL: SEVERE PAIN (7)

## 2023-02-14 NOTE — PATIENT INSTRUCTIONS
For informational purposes only. Not to replace the advice of your health care provider. Copyright   2003,  [facility]. All rights reserved. Clinically reviewed by Winsome Grace MD. [employer] [id number] - REV .  Preparing for Your Surgery  Getting started  A nurse will call you to review your health history and instructions. They will give you an arrival time based on your scheduled surgery time. Please be ready to share:    Your doctor's clinic name and phone number    Your medical, surgical, and anesthesia history    A list of allergies and sensitivities    A list of medicines, including herbal treatments and over-the-counter drugs    Whether the patient has a legal guardian (ask how to send us the papers in advance)  Please tell us if you're pregnant--or if there's any chance you might be pregnant. Some surgeries may injure a fetus (unborn baby), so they require a pregnancy test. Surgeries that are safe for a fetus don't always need a test, and you can choose whether to have one.   If you have a child who's having surgery, please ask for a copy of Preparing for Your Child's Surgery.    Preparing for surgery    Within 10 to 30 days of surgery: Have a pre-op exam (sometimes called an H&P, or History and Physical). This can be done at a clinic or pre-operative center.  ? If you're having a , you may not need this exam. Talk to your care team.    At your pre-op exam, talk to your care team about all medicines you take. If you need to stop any medicines before surgery, ask when to start taking them again.  ? We do this for your safety. Many medicines can make you bleed too much during surgery. Some change how well surgery (anesthesia) drugs work.    Call your insurance company to let them know you're having surgery. (If you don't have insurance, call [phone].)    Call your clinic if there's any change in your health. This includes signs of a cold or flu (sore throat, runny nose,  cough, rash, fever). It also includes a scrape or scratch near the surgery site.    If you have questions on the day of surgery, call your hospital or surgery center.  Eating and drinking guidelines  For your safety: Unless your surgeon tells you otherwise, follow the guidelines below.    Eat and drink as usual until 8 hours before you arrive for surgery. After that, no food or milk.    Drink clear liquids until 2 hours before you arrive. These are liquids you can see through, like water, Gatorade, and Propel Water. They also include plain black coffee and tea (no cream or milk), candy, and breath mints. You can spit out gum when you arrive.    If you drink alcohol: Stop drinking it the night before surgery.    If your care team tells you to take medicine on the morning of surgery, it's okay to take it with a sip of water.  Preventing infection    Shower or bathe the night before and morning of your surgery. Follow the instructions your clinic gave you. (If no instructions, use regular soap.)    Don't shave or clip hair near your surgery site. We'll remove the hair if needed.    Don't smoke or vape the morning of surgery. You may chew nicotine gum up to 2 hours before surgery. A nicotine patch is okay.  ? Note: Some surgeries require you to completely quit smoking and nicotine. Check with your surgeon.    Your care team will make every effort to keep you safe from infection. We will:  ? Clean our hands often with soap and water (or an alcohol-based hand rub).  ? Clean the skin at your surgery site with a special soap that kills germs.  ? Give you a special gown to keep you warm. (Cold raises the risk of infection.)  ? Wear special hair covers, masks, gowns and gloves during surgery.  ? Give antibiotic medicine, if prescribed. Not all surgeries need antibiotics.  What to bring on the day of surgery    Photo ID and insurance card    Copy of your health care directive, if you have one    Glasses and hearing aids (bring  cases)  ? You can't wear contacts during surgery    Inhaler and eye drops, if you use them (tell us about these when you arrive)    CPAP machine or breathing device, if you use them    A few personal items, if spending the night    If you have . . .  ? A pacemaker, ICD (cardiac defibrillator) or other implant: Bring the ID card.  ? An implanted stimulator: Bring the remote control.  ? A legal guardian: Bring a copy of the certified (court-stamped) guardianship papers.  Please remove any jewelry, including body piercings. Leave jewelry and other valuables at home.  If you're going home the day of surgery    You must have a responsible adult drive you home. They should stay with you overnight as well.    If you don't have someone to stay with you, and you aren't safe to go home alone, we may keep you overnight. Insurance often won't pay for this.  After surgery  If it's hard to control your pain or you need more pain medicine, please call your surgeon's office.  Questions?   If you have any questions for your care team, list them here: _________________________________________________________________________________________________________________________________________________________________________ ____________________________________ ____________________________________ ____________________________________

## 2023-02-14 NOTE — PROGRESS NOTES
[facility]  [address]  [place] [address]  Phone: [phone]  Fax: [phone]  Primary Provider: Omayra Sousa  Pre-op Performing Provider: AIDAN BRASWELL      PREOPERATIVE EVALUATION:  Today's date: 2/14/2023    Tere Camarena is a 47 year old female who presents for a preoperative evaluation.    Surgical Information:  Surgery/Procedure: ARTHROSCOPY, ANKLE, WITH OPEN REPAIR OF ANKLE LIGAMENT    Surgery Location: [facility]  Surgeon: Dr. Sibley  Surgery Date: 02/16/23  Time of Surgery:    Where patient plans to recover: At home with family  Fax number for surgical facility: Note does not need to be faxed, will be available electronically in Epic.    Type of Anesthesia Anticipated: General    Assessment & Plan     The proposed surgical procedure is considered INTERMEDIATE risk.    1. Preop general physical exam  - CBC and Differential  - Basic Metabolic Panel  - EKG 12-lead, tracing only (Same Day)  -Patient presenting for preoperative evaluation for upcoming right ankle surgery on 2/16/2023 with orthopedics.  CBC returning with normal red, blood cell indices.  BMP is stable.  EKG returning with sinus bradycardia, similar to that from August 2022.  Approval given to proceed with proposed procedure.  - Patient is in agreement and understanding of the above treatment plan. All questions and concerns were addressed and answered to patient's satisfaction. AVS reviewed with patient.     Risks and Recommendations:  The patient has the following additional risks and recommendations for perioperative complications:   - No identified additional risk factors other than previously addressed    Medication Instructions:  Patient is to take all scheduled medications on the day of surgery    RECOMMENDATION:  APPROVAL GIVEN to proceed with proposed procedure, without further diagnostic evaluation.    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  She has had ongoing/chronic ankle instability with prior sprains and other pathology in the past.  She has been in a cam boot since approximately December 2022.  Her pain has progressively worsened.  She will be having a ankle arthroscopy, OCD lesion management, debridement and repair with bio cartilage.    She takes topiramate PM dosing for migraines.  Last migraine was approximately 1 month ago.  Migraines are primarily related to diet and    Preop Questions 2/14/2023   1. Have you ever had a heart attack or stroke? No   2. Have you ever had surgery on your heart or blood vessels, such as a stent placement, a coronary artery bypass, or surgery on an artery in your head, neck, heart, or legs? No   3. Do you have chest pain with activity? No   4. Do you have a history of  heart failure? No   5. Do you currently have a cold, bronchitis or symptoms of other infection? No   6. Do you have a cough, shortness of breath, or wheezing? YES - occasional, not new onset. No fevers, chills. No illness   7. Do you or anyone in your family have previous history of blood clots? No   8. Do you or does anyone in your family have a serious bleeding problem such as prolonged bleeding following surgeries or cuts? No   9. Have you ever had problems with anemia or been told to take iron pills? YES - occasional - bleeding hemorrhoids as etiology   10. Have you had any abnormal blood loss such as black, tarry or bloody stools, or abnormal vaginal bleeding? UNKNOWN - no   11. Have you ever had a blood transfusion? No   12. Are you willing to have a blood transfusion if it is medically needed before, during, or after your surgery? Yes   13. Have you or any of your relatives ever had problems with anesthesia? No   14. Do you have sleep apnea, excessive snoring or daytime drowsiness?  No   15. Do you have any artifical heart valves or other implanted medical devices like a pacemaker, defibrillator, or continuous glucose monitor? No   16. Do you have artificial joints? No   17. Are you allergic to latex? No   18. Is there any chance that you may be pregnant? No     Health Care Directive:  Patient does not have a Health Care Directive or Living Will: Discussed advance care planning with patient; however, patient declined at this time.    Preoperative Review of :   reviewed - no record of controlled substances prescribed.    Status of Chronic Conditions:  See problem list for active medical problems.  Problems all longstanding and stable, except as noted/documented.  See ROS for pertinent symptoms related to these conditions.      Review of Systems  Constitutional, neuro, ENT, endocrine, pulmonary, cardiac, gastrointestinal, genitourinary, musculoskeletal, integument and psychiatric systems are negative, except as otherwise noted.    There are no problems to display for this patient.     History reviewed. No pertinent past medical history.  Past Surgical History:   Procedure Laterality Date     ABDOMEN SURGERY      hernia surgery X5     BIOPSY BREAST NEEDLE LOCALIZATION Left 01/30/2017    Procedure: BIOPSY BREAST NEEDLE LOCALIZATION;  Surgeon: Sita Rushing MD;  Location: [facility] OR     BREAST SURGERY Left 09/28/2016    biopsy     BREAST SURGERY Left 09/13/2016    biopsy     COLONOSCOPY N/A 02/18/2021    Procedure: COLONOSCOPY, WITH POLYPECTOMY AND BIOPSY, and hemorrhoid banding;  Surgeon: Naeem Camarena MD;  Location: [facility] OR     GYN SURGERY  2004    tubal ligation     ORTHOPEDIC SURGERY Left 10/31/2016    left knee medial retinacular repair, VMO advancement, and reapir of partial tear of quadriceps tendon     ORTHOPEDIC SURGERY Left 04/18/2017    left knee manipulation     TUBAL LIGATION       Current Outpatient Medications   Medication Sig Dispense Refill     topiramate (TOPAMAX) 25 MG  "tablet Take 1 tablet in morning and 2 tablets in evening 90 tablet 3     SUMAtriptan (IMITREX) 50 MG tablet Take 1 tablet (50 mg) by mouth at onset of headache for migraine May repeat in 2 hours. Max 4 tablets/24 hours. (Patient not taking: Reported on 1/2/2023) 30 tablet 3       Allergies   Allergen Reactions     Peanuts [Peanut-Derived]      Eyes swell     Trees Other (See Comments)     Tree sap makes her eyes swell shut        Social History     Tobacco Use     Smoking status: Every Day     Packs/day: 1.00     Years: 20.00     Pack years: 20.00     Types: Cigarettes     Smokeless tobacco: Never   Substance Use Topics     Alcohol use: Not Currently     Alcohol/week: 5.0 standard drinks     Types: 5 Cans of beer per week     Comment:  weekly      Family History   Problem Relation Age of Onset     Other - See Comments Mother         back surgery     Other Cancer Father         skin cancer     Hypertension Father      Gastrointestinal Disease Father         Diverticulosis     History   Drug Use No         Objective     /72   Pulse 68   Temp 98.1  F (36.7  C)   Resp 16   Ht 1.727 m (5' 8\")   Wt 84.5 kg (186 lb 3.2 oz)   LMP 12/23/2022 (Approximate)   SpO2 98%   BMI 28.31 kg/m      Physical Exam    GENERAL APPEARANCE: healthy, alert and no distress     EYES: EOMI, PERRL     HENT: ear canals and TM's normal and nose and mouth without ulcers or lesions     NECK: no adenopathy, no asymmetry, masses, or scars and thyroid normal to palpation     RESP: lungs clear to auscultation - no rales, rhonchi or wheezes     CV: regular rates and rhythm, normal S1 S2, no S3 or S4 and no murmur, click or rub     ABDOMEN:  soft, nontender, no HSM or masses and bowel sounds normal     MS: extremities normal- no gross deformities noted, no evidence of inflammation in joints, FROM in all extremities.     SKIN: no suspicious lesions or rashes     NEURO: Normal strength and tone, sensory exam grossly normal, mentation intact and " speech normal     PSYCH: mentation appears normal. and affect normal/bright     LYMPHATICS: No cervical adenopathy    Recent Labs   Lab Test 08/16/22  1140 06/27/22  1256 01/20/22  1441   HGB 15.0 15.6 15.4    196 292     --  141   POTASSIUM 4.3  --  4.3   CR 0.74  --  0.62      Diagnostics:  Recent Results (from the past 24 hour(s))   EKG 12-lead, tracing only (Same Day)    Collection Time: 02/14/23  8:45 AM   Result Value Ref Range    Systolic Blood Pressure  mmHg    Diastolic Blood Pressure  mmHg    Ventricular Rate 48 BPM    Atrial Rate 48 BPM    NM Interval 144 ms    QRS Duration 88 ms     ms    QTc 398 ms    P Axis 44 degrees    R AXIS 40 degrees    T Axis 41 degrees    Interpretation ECG       Sinus bradycardia with sinus arrhythmia  Otherwise normal ECG  When compared with ECG of 16-AUG-2022 11:57,  No significant change was found     Basic Metabolic Panel    Collection Time: 02/14/23  9:11 AM   Result Value Ref Range    Sodium 139 136 - 145 mmol/L    Potassium 3.9 3.4 - 5.3 mmol/L    Chloride 109 (H) 98 - 107 mmol/L    Carbon Dioxide (CO2) 22 22 - 29 mmol/L    Anion Gap 8 7 - 15 mmol/L    Urea Nitrogen 10.5 6.0 - 20.0 mg/dL    Creatinine 0.70 0.51 - 0.95 mg/dL    Calcium 9.2 8.6 - 10.0 mg/dL    Glucose 93 70 - 99 mg/dL    GFR Estimate >90 >60 mL/min/1.73m2   CBC with platelets and differential    Collection Time: 02/14/23  9:11 AM   Result Value Ref Range    WBC Count 10.1 4.0 - 11.0 10e3/uL    RBC Count 4.29 3.80 - 5.20 10e6/uL    Hemoglobin 14.8 11.7 - 15.7 g/dL    Hematocrit 42.4 35.0 - 47.0 %    MCV 99 78 - 100 fL    MCH 34.5 (H) 26.5 - 33.0 pg    MCHC 34.9 31.5 - 36.5 g/dL    RDW 13.2 10.0 - 15.0 %    Platelet Count 193 150 - 450 10e3/uL    % Neutrophils 70 %    % Lymphocytes 22 %    % Monocytes 6 %    % Eosinophils 2 %    % Basophils 0 %    % Immature Granulocytes 0 %    NRBCs per 100 WBC 0 <1 /100    Absolute Neutrophils 7.0 1.6 - 8.3 10e3/uL    Absolute Lymphocytes 2.2 0.8 - 5.3  10e3/uL    Absolute Monocytes 0.6 0.0 - 1.3 10e3/uL    Absolute Eosinophils 0.2 0.0 - 0.7 10e3/uL    Absolute Basophils 0.0 0.0 - 0.2 10e3/uL    Absolute Immature Granulocytes 0.0 <=0.4 10e3/uL    Absolute NRBCs 0.0 10e3/uL      EKG completed as sinus bradycardia noted on prior EKG, obtained for rate comparison.     Revised Cardiac Risk Index (RCRI):  The patient has the following serious cardiovascular risks for perioperative complications:   - No serious cardiac risks = 0 points     RCRI Interpretation: 0 points: Class I (very low risk - 0.4% complication rate)         Signed Electronically by: Alla Cardenas PA-C  Copy of this evaluation report is provided to requesting physician.

## 2023-02-14 NOTE — NURSING NOTE
Patient presents today for pre op exam.    Medication Reconciliation Complete    Hayley Riggs LPN  2/14/2023 8:17 AM

## 2023-02-14 NOTE — H&P (VIEW-ONLY)
[facility]  [address]  [place] [address]  Phone: [phone]  Fax: [phone]  Primary Provider: Omayra Sousa  Pre-op Performing Provider: AIDAN BRASWELL      PREOPERATIVE EVALUATION:  Today's date: 2/14/2023    Tere Camarena is a 47 year old female who presents for a preoperative evaluation.    Surgical Information:  Surgery/Procedure: ARTHROSCOPY, ANKLE, WITH OPEN REPAIR OF ANKLE LIGAMENT    Surgery Location: [facility]  Surgeon: Dr. Sibley  Surgery Date: 02/16/23  Time of Surgery:    Where patient plans to recover: At home with family  Fax number for surgical facility: Note does not need to be faxed, will be available electronically in Epic.    Type of Anesthesia Anticipated: General    Assessment & Plan     The proposed surgical procedure is considered INTERMEDIATE risk.    1. Preop general physical exam  - CBC and Differential  - Basic Metabolic Panel  - EKG 12-lead, tracing only (Same Day)  -Patient presenting for preoperative evaluation for upcoming right ankle surgery on 2/16/2023 with orthopedics.  CBC returning with normal red, blood cell indices.  BMP is stable.  EKG returning with sinus bradycardia, similar to that from August 2022.  Approval given to proceed with proposed procedure.  - Patient is in agreement and understanding of the above treatment plan. All questions and concerns were addressed and answered to patient's satisfaction. AVS reviewed with patient.     Risks and Recommendations:  The patient has the following additional risks and recommendations for perioperative complications:   - No identified additional risk factors other than previously addressed    Medication Instructions:  Patient is to take all scheduled medications on the day of surgery    RECOMMENDATION:  APPROVAL GIVEN to proceed with proposed procedure, without further diagnostic evaluation.    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  She has had ongoing/chronic ankle instability with prior sprains and other pathology in the past.  She has been in a cam boot since approximately December 2022.  Her pain has progressively worsened.  She will be having a ankle arthroscopy, OCD lesion management, debridement and repair with bio cartilage.    She takes topiramate PM dosing for migraines.  Last migraine was approximately 1 month ago.  Migraines are primarily related to diet and    Preop Questions 2/14/2023   1. Have you ever had a heart attack or stroke? No   2. Have you ever had surgery on your heart or blood vessels, such as a stent placement, a coronary artery bypass, or surgery on an artery in your head, neck, heart, or legs? No   3. Do you have chest pain with activity? No   4. Do you have a history of  heart failure? No   5. Do you currently have a cold, bronchitis or symptoms of other infection? No   6. Do you have a cough, shortness of breath, or wheezing? YES - occasional, not new onset. No fevers, chills. No illness   7. Do you or anyone in your family have previous history of blood clots? No   8. Do you or does anyone in your family have a serious bleeding problem such as prolonged bleeding following surgeries or cuts? No   9. Have you ever had problems with anemia or been told to take iron pills? YES - occasional - bleeding hemorrhoids as etiology   10. Have you had any abnormal blood loss such as black, tarry or bloody stools, or abnormal vaginal bleeding? UNKNOWN - no   11. Have you ever had a blood transfusion? No   12. Are you willing to have a blood transfusion if it is medically needed before, during, or after your surgery? Yes   13. Have you or any of your relatives ever had problems with anesthesia? No   14. Do you have sleep apnea, excessive snoring or daytime drowsiness?  No   15. Do you have any artifical heart valves or other implanted medical devices like a pacemaker, defibrillator, or continuous glucose monitor? No   16. Do you have artificial joints? No   17. Are you allergic to latex? No   18. Is there any chance that you may be pregnant? No     Health Care Directive:  Patient does not have a Health Care Directive or Living Will: Discussed advance care planning with patient; however, patient declined at this time.    Preoperative Review of :   reviewed - no record of controlled substances prescribed.    Status of Chronic Conditions:  See problem list for active medical problems.  Problems all longstanding and stable, except as noted/documented.  See ROS for pertinent symptoms related to these conditions.      Review of Systems  Constitutional, neuro, ENT, endocrine, pulmonary, cardiac, gastrointestinal, genitourinary, musculoskeletal, integument and psychiatric systems are negative, except as otherwise noted.    There are no problems to display for this patient.     History reviewed. No pertinent past medical history.  Past Surgical History:   Procedure Laterality Date     ABDOMEN SURGERY      hernia surgery X5     BIOPSY BREAST NEEDLE LOCALIZATION Left 01/30/2017    Procedure: BIOPSY BREAST NEEDLE LOCALIZATION;  Surgeon: Sita Rushing MD;  Location: [facility] OR     BREAST SURGERY Left 09/28/2016    biopsy     BREAST SURGERY Left 09/13/2016    biopsy     COLONOSCOPY N/A 02/18/2021    Procedure: COLONOSCOPY, WITH POLYPECTOMY AND BIOPSY, and hemorrhoid banding;  Surgeon: Naeem Camarena MD;  Location: [facility] OR     GYN SURGERY  2004    tubal ligation     ORTHOPEDIC SURGERY Left 10/31/2016    left knee medial retinacular repair, VMO advancement, and reapir of partial tear of quadriceps tendon     ORTHOPEDIC SURGERY Left 04/18/2017    left knee manipulation     TUBAL LIGATION       Current Outpatient Medications   Medication Sig Dispense Refill     topiramate (TOPAMAX) 25 MG  "tablet Take 1 tablet in morning and 2 tablets in evening 90 tablet 3     SUMAtriptan (IMITREX) 50 MG tablet Take 1 tablet (50 mg) by mouth at onset of headache for migraine May repeat in 2 hours. Max 4 tablets/24 hours. (Patient not taking: Reported on 1/2/2023) 30 tablet 3       Allergies   Allergen Reactions     Peanuts [Peanut-Derived]      Eyes swell     Trees Other (See Comments)     Tree sap makes her eyes swell shut        Social History     Tobacco Use     Smoking status: Every Day     Packs/day: 1.00     Years: 20.00     Pack years: 20.00     Types: Cigarettes     Smokeless tobacco: Never   Substance Use Topics     Alcohol use: Not Currently     Alcohol/week: 5.0 standard drinks     Types: 5 Cans of beer per week     Comment:  weekly      Family History   Problem Relation Age of Onset     Other - See Comments Mother         back surgery     Other Cancer Father         skin cancer     Hypertension Father      Gastrointestinal Disease Father         Diverticulosis     History   Drug Use No         Objective     /72   Pulse 68   Temp 98.1  F (36.7  C)   Resp 16   Ht 1.727 m (5' 8\")   Wt 84.5 kg (186 lb 3.2 oz)   LMP 12/23/2022 (Approximate)   SpO2 98%   BMI 28.31 kg/m      Physical Exam    GENERAL APPEARANCE: healthy, alert and no distress     EYES: EOMI, PERRL     HENT: ear canals and TM's normal and nose and mouth without ulcers or lesions     NECK: no adenopathy, no asymmetry, masses, or scars and thyroid normal to palpation     RESP: lungs clear to auscultation - no rales, rhonchi or wheezes     CV: regular rates and rhythm, normal S1 S2, no S3 or S4 and no murmur, click or rub     ABDOMEN:  soft, nontender, no HSM or masses and bowel sounds normal     MS: extremities normal- no gross deformities noted, no evidence of inflammation in joints, FROM in all extremities.     SKIN: no suspicious lesions or rashes     NEURO: Normal strength and tone, sensory exam grossly normal, mentation intact and " speech normal     PSYCH: mentation appears normal. and affect normal/bright     LYMPHATICS: No cervical adenopathy    Recent Labs   Lab Test 08/16/22  1140 06/27/22  1256 01/20/22  1441   HGB 15.0 15.6 15.4    196 292     --  141   POTASSIUM 4.3  --  4.3   CR 0.74  --  0.62      Diagnostics:  Recent Results (from the past 24 hour(s))   EKG 12-lead, tracing only (Same Day)    Collection Time: 02/14/23  8:45 AM   Result Value Ref Range    Systolic Blood Pressure  mmHg    Diastolic Blood Pressure  mmHg    Ventricular Rate 48 BPM    Atrial Rate 48 BPM    ME Interval 144 ms    QRS Duration 88 ms     ms    QTc 398 ms    P Axis 44 degrees    R AXIS 40 degrees    T Axis 41 degrees    Interpretation ECG       Sinus bradycardia with sinus arrhythmia  Otherwise normal ECG  When compared with ECG of 16-AUG-2022 11:57,  No significant change was found     Basic Metabolic Panel    Collection Time: 02/14/23  9:11 AM   Result Value Ref Range    Sodium 139 136 - 145 mmol/L    Potassium 3.9 3.4 - 5.3 mmol/L    Chloride 109 (H) 98 - 107 mmol/L    Carbon Dioxide (CO2) 22 22 - 29 mmol/L    Anion Gap 8 7 - 15 mmol/L    Urea Nitrogen 10.5 6.0 - 20.0 mg/dL    Creatinine 0.70 0.51 - 0.95 mg/dL    Calcium 9.2 8.6 - 10.0 mg/dL    Glucose 93 70 - 99 mg/dL    GFR Estimate >90 >60 mL/min/1.73m2   CBC with platelets and differential    Collection Time: 02/14/23  9:11 AM   Result Value Ref Range    WBC Count 10.1 4.0 - 11.0 10e3/uL    RBC Count 4.29 3.80 - 5.20 10e6/uL    Hemoglobin 14.8 11.7 - 15.7 g/dL    Hematocrit 42.4 35.0 - 47.0 %    MCV 99 78 - 100 fL    MCH 34.5 (H) 26.5 - 33.0 pg    MCHC 34.9 31.5 - 36.5 g/dL    RDW 13.2 10.0 - 15.0 %    Platelet Count 193 150 - 450 10e3/uL    % Neutrophils 70 %    % Lymphocytes 22 %    % Monocytes 6 %    % Eosinophils 2 %    % Basophils 0 %    % Immature Granulocytes 0 %    NRBCs per 100 WBC 0 <1 /100    Absolute Neutrophils 7.0 1.6 - 8.3 10e3/uL    Absolute Lymphocytes 2.2 0.8 - 5.3  10e3/uL    Absolute Monocytes 0.6 0.0 - 1.3 10e3/uL    Absolute Eosinophils 0.2 0.0 - 0.7 10e3/uL    Absolute Basophils 0.0 0.0 - 0.2 10e3/uL    Absolute Immature Granulocytes 0.0 <=0.4 10e3/uL    Absolute NRBCs 0.0 10e3/uL      EKG completed as sinus bradycardia noted on prior EKG, obtained for rate comparison.     Revised Cardiac Risk Index (RCRI):  The patient has the following serious cardiovascular risks for perioperative complications:   - No serious cardiac risks = 0 points     RCRI Interpretation: 0 points: Class I (very low risk - 0.4% complication rate)         Signed Electronically by: Alla Cardenas PA-C  Copy of this evaluation report is provided to requesting physician.

## 2023-02-15 ENCOUNTER — ANESTHESIA EVENT (OUTPATIENT)
Dept: SURGERY | Facility: OTHER | Age: 48
End: 2023-02-15
Payer: COMMERCIAL

## 2023-02-16 ENCOUNTER — HOSPITAL ENCOUNTER (OUTPATIENT)
Facility: OTHER | Age: 48
Discharge: HOME | End: 2023-02-16
Attending: PODIATRIST | Admitting: PODIATRIST
Payer: COMMERCIAL

## 2023-02-16 ENCOUNTER — ANESTHESIA (OUTPATIENT)
Dept: SURGERY | Facility: OTHER | Age: 48
End: 2023-02-16
Payer: COMMERCIAL

## 2023-02-16 VITALS
HEART RATE: 58 BPM | HEIGHT: 68 IN | OXYGEN SATURATION: 96 % | WEIGHT: 186 LBS | SYSTOLIC BLOOD PRESSURE: 106 MMHG | TEMPERATURE: 98.1 F | BODY MASS INDEX: 28.19 KG/M2 | DIASTOLIC BLOOD PRESSURE: 70 MMHG | RESPIRATION RATE: 20 BRPM

## 2023-02-16 DIAGNOSIS — G89.18 POST-OP PAIN: Primary | ICD-10-CM

## 2023-02-16 PROCEDURE — 64447 NJX AA&/STRD FEMORAL NRV IMG: CPT | Mod: RT | Performed by: NURSE ANESTHETIST, CERTIFIED REGISTERED

## 2023-02-16 PROCEDURE — 258N000003 HC RX IP 258 OP 636: Performed by: NURSE ANESTHETIST, CERTIFIED REGISTERED

## 2023-02-16 PROCEDURE — 250N000009 HC RX 250: Performed by: NURSE ANESTHETIST, CERTIFIED REGISTERED

## 2023-02-16 PROCEDURE — 272N000001 HC OR GENERAL SUPPLY STERILE: Performed by: PODIATRIST

## 2023-02-16 PROCEDURE — 27696 REPAIR OF ANKLE LIGAMENTS: CPT | Performed by: NURSE ANESTHETIST, CERTIFIED REGISTERED

## 2023-02-16 PROCEDURE — C1713 ANCHOR/SCREW BN/BN,TIS/BN: HCPCS | Performed by: PODIATRIST

## 2023-02-16 PROCEDURE — 250N000011 HC RX IP 250 OP 636: Performed by: NURSE ANESTHETIST, CERTIFIED REGISTERED

## 2023-02-16 PROCEDURE — 370N000017 HC ANESTHESIA TECHNICAL FEE, PER MIN: Performed by: PODIATRIST

## 2023-02-16 PROCEDURE — 258N000003 HC RX IP 258 OP 636: Performed by: PODIATRIST

## 2023-02-16 PROCEDURE — 999N000141 HC STATISTIC PRE-PROCEDURE NURSING ASSESSMENT: Performed by: PODIATRIST

## 2023-02-16 PROCEDURE — 360N000076 HC SURGERY LEVEL 3, PER MIN: Performed by: PODIATRIST

## 2023-02-16 PROCEDURE — 272N000002 HC OR SUPPLY OTHER OPNP: Performed by: PODIATRIST

## 2023-02-16 PROCEDURE — 710N000012 HC RECOVERY PHASE 2, PER MINUTE: Performed by: PODIATRIST

## 2023-02-16 PROCEDURE — 29898 ANKLE ARTHROSCOPY/SURGERY: CPT | Mod: RT | Performed by: PODIATRIST

## 2023-02-16 PROCEDURE — 64445 NJX AA&/STRD SCIATIC NRV IMG: CPT | Mod: RT | Performed by: NURSE ANESTHETIST, CERTIFIED REGISTERED

## 2023-02-16 PROCEDURE — 250N000011 HC RX IP 250 OP 636: Performed by: PODIATRIST

## 2023-02-16 PROCEDURE — 710N000010 HC RECOVERY PHASE 1, LEVEL 2, PER MIN: Performed by: PODIATRIST

## 2023-02-16 PROCEDURE — 27696 REPAIR OF ANKLE LIGAMENTS: CPT | Mod: RT | Performed by: PODIATRIST

## 2023-02-16 DEVICE — 2.9MM BIOCOMP PUSHLOCK, DX
Type: IMPLANTABLE DEVICE | Site: ANKLE | Status: FUNCTIONAL
Brand: ARTHREX®

## 2023-02-16 DEVICE — DX FIBERTAK SUTURE ANCHOR, ST & NDLS
Type: IMPLANTABLE DEVICE | Site: ANKLE | Status: FUNCTIONAL
Brand: ARTHREX®

## 2023-02-16 RX ORDER — ONDANSETRON 2 MG/ML
4 INJECTION INTRAMUSCULAR; INTRAVENOUS EVERY 30 MIN PRN
Status: DISCONTINUED | OUTPATIENT
Start: 2023-02-16 | End: 2023-02-16 | Stop reason: HOSPADM

## 2023-02-16 RX ORDER — FENTANYL CITRATE 50 UG/ML
25 INJECTION, SOLUTION INTRAMUSCULAR; INTRAVENOUS EVERY 5 MIN PRN
Status: DISCONTINUED | OUTPATIENT
Start: 2023-02-16 | End: 2023-02-16 | Stop reason: HOSPADM

## 2023-02-16 RX ORDER — ONDANSETRON 2 MG/ML
INJECTION INTRAMUSCULAR; INTRAVENOUS PRN
Status: DISCONTINUED | OUTPATIENT
Start: 2023-02-16 | End: 2023-02-16

## 2023-02-16 RX ORDER — NALOXONE HYDROCHLORIDE 0.4 MG/ML
0.4 INJECTION, SOLUTION INTRAMUSCULAR; INTRAVENOUS; SUBCUTANEOUS
Status: DISCONTINUED | OUTPATIENT
Start: 2023-02-16 | End: 2023-02-16 | Stop reason: HOSPADM

## 2023-02-16 RX ORDER — BUPIVACAINE HYDROCHLORIDE 5 MG/ML
INJECTION, SOLUTION EPIDURAL; INTRACAUDAL PRN
Status: DISCONTINUED | OUTPATIENT
Start: 2023-02-16 | End: 2023-02-16

## 2023-02-16 RX ORDER — HYDROMORPHONE HYDROCHLORIDE 1 MG/ML
0.4 INJECTION, SOLUTION INTRAMUSCULAR; INTRAVENOUS; SUBCUTANEOUS EVERY 5 MIN PRN
Status: DISCONTINUED | OUTPATIENT
Start: 2023-02-16 | End: 2023-02-16 | Stop reason: HOSPADM

## 2023-02-16 RX ORDER — NALOXONE HYDROCHLORIDE 0.4 MG/ML
0.2 INJECTION, SOLUTION INTRAMUSCULAR; INTRAVENOUS; SUBCUTANEOUS
Status: DISCONTINUED | OUTPATIENT
Start: 2023-02-16 | End: 2023-02-16 | Stop reason: HOSPADM

## 2023-02-16 RX ORDER — PROPOFOL 10 MG/ML
INJECTION, EMULSION INTRAVENOUS CONTINUOUS PRN
Status: DISCONTINUED | OUTPATIENT
Start: 2023-02-16 | End: 2023-02-16

## 2023-02-16 RX ORDER — DEXAMETHASONE SODIUM PHOSPHATE 10 MG/ML
INJECTION, SOLUTION INTRAMUSCULAR; INTRAVENOUS PRN
Status: DISCONTINUED | OUTPATIENT
Start: 2023-02-16 | End: 2023-02-16

## 2023-02-16 RX ORDER — FENTANYL CITRATE 50 UG/ML
50 INJECTION, SOLUTION INTRAMUSCULAR; INTRAVENOUS EVERY 5 MIN PRN
Status: DISCONTINUED | OUTPATIENT
Start: 2023-02-16 | End: 2023-02-16 | Stop reason: HOSPADM

## 2023-02-16 RX ORDER — OXYCODONE HYDROCHLORIDE 5 MG/1
10 TABLET ORAL EVERY 4 HOURS PRN
Status: DISCONTINUED | OUTPATIENT
Start: 2023-02-16 | End: 2023-02-16 | Stop reason: HOSPADM

## 2023-02-16 RX ORDER — PROPOFOL 10 MG/ML
INJECTION, EMULSION INTRAVENOUS PRN
Status: DISCONTINUED | OUTPATIENT
Start: 2023-02-16 | End: 2023-02-16

## 2023-02-16 RX ORDER — SODIUM CHLORIDE, SODIUM LACTATE, POTASSIUM CHLORIDE, CALCIUM CHLORIDE 600; 310; 30; 20 MG/100ML; MG/100ML; MG/100ML; MG/100ML
INJECTION, SOLUTION INTRAVENOUS CONTINUOUS
Status: DISCONTINUED | OUTPATIENT
Start: 2023-02-16 | End: 2023-02-16 | Stop reason: HOSPADM

## 2023-02-16 RX ORDER — ONDANSETRON 4 MG/1
4 TABLET, ORALLY DISINTEGRATING ORAL EVERY 30 MIN PRN
Status: DISCONTINUED | OUTPATIENT
Start: 2023-02-16 | End: 2023-02-16 | Stop reason: HOSPADM

## 2023-02-16 RX ORDER — CEFAZOLIN SODIUM/WATER 2 G/20 ML
2 SYRINGE (ML) INTRAVENOUS
Status: COMPLETED | OUTPATIENT
Start: 2023-02-16 | End: 2023-02-16

## 2023-02-16 RX ORDER — IBUPROFEN 600 MG/1
600 TABLET, FILM COATED ORAL EVERY 6 HOURS
Qty: 12 TABLET | Refills: 0 | Status: SHIPPED | OUTPATIENT
Start: 2023-02-16 | End: 2023-02-19

## 2023-02-16 RX ORDER — HYDROMORPHONE HYDROCHLORIDE 1 MG/ML
0.2 INJECTION, SOLUTION INTRAMUSCULAR; INTRAVENOUS; SUBCUTANEOUS EVERY 5 MIN PRN
Status: DISCONTINUED | OUTPATIENT
Start: 2023-02-16 | End: 2023-02-16 | Stop reason: HOSPADM

## 2023-02-16 RX ORDER — HYDROXYZINE HYDROCHLORIDE 10 MG/1
10 TABLET, FILM COATED ORAL
Status: DISCONTINUED | OUTPATIENT
Start: 2023-02-16 | End: 2023-02-16 | Stop reason: HOSPADM

## 2023-02-16 RX ORDER — LIDOCAINE HYDROCHLORIDE 20 MG/ML
INJECTION, SOLUTION INFILTRATION; PERINEURAL PRN
Status: DISCONTINUED | OUTPATIENT
Start: 2023-02-16 | End: 2023-02-16

## 2023-02-16 RX ORDER — OXYCODONE HYDROCHLORIDE 5 MG/1
5 TABLET ORAL EVERY 6 HOURS PRN
Qty: 16 TABLET | Refills: 0 | Status: SHIPPED | OUTPATIENT
Start: 2023-02-16 | End: 2023-09-11

## 2023-02-16 RX ORDER — OXYCODONE HYDROCHLORIDE 5 MG/1
5 TABLET ORAL
Status: DISCONTINUED | OUTPATIENT
Start: 2023-02-16 | End: 2023-02-16 | Stop reason: HOSPADM

## 2023-02-16 RX ORDER — OXYCODONE HYDROCHLORIDE 5 MG/1
5 TABLET ORAL EVERY 4 HOURS PRN
Status: DISCONTINUED | OUTPATIENT
Start: 2023-02-16 | End: 2023-02-16 | Stop reason: HOSPADM

## 2023-02-16 RX ORDER — CEFAZOLIN SODIUM/WATER 2 G/20 ML
2 SYRINGE (ML) INTRAVENOUS SEE ADMIN INSTRUCTIONS
Status: DISCONTINUED | OUTPATIENT
Start: 2023-02-16 | End: 2023-02-16 | Stop reason: HOSPADM

## 2023-02-16 RX ORDER — KETAMINE HYDROCHLORIDE 10 MG/ML
INJECTION INTRAMUSCULAR; INTRAVENOUS PRN
Status: DISCONTINUED | OUTPATIENT
Start: 2023-02-16 | End: 2023-02-16

## 2023-02-16 RX ORDER — ACETAMINOPHEN 325 MG/1
1000 TABLET ORAL EVERY 8 HOURS
Qty: 45 TABLET | Refills: 0 | Status: SHIPPED | OUTPATIENT
Start: 2023-02-16 | End: 2023-02-21

## 2023-02-16 RX ORDER — LIDOCAINE 40 MG/G
CREAM TOPICAL
Status: DISCONTINUED | OUTPATIENT
Start: 2023-02-16 | End: 2023-02-16 | Stop reason: HOSPADM

## 2023-02-16 RX ADMIN — LIDOCAINE HYDROCHLORIDE 60 MG: 20 INJECTION, SOLUTION INFILTRATION; PERINEURAL at 12:57

## 2023-02-16 RX ADMIN — MIDAZOLAM HYDROCHLORIDE 2 MG: 1 INJECTION, SOLUTION INTRAMUSCULAR; INTRAVENOUS at 12:49

## 2023-02-16 RX ADMIN — MIDAZOLAM 4 MG: 1 INJECTION INTRAMUSCULAR; INTRAVENOUS at 11:43

## 2023-02-16 RX ADMIN — ONDANSETRON HYDROCHLORIDE 4 MG: 2 SOLUTION INTRAMUSCULAR; INTRAVENOUS at 13:03

## 2023-02-16 RX ADMIN — PROPOFOL 200 MCG/KG/MIN: 10 INJECTION, EMULSION INTRAVENOUS at 12:59

## 2023-02-16 RX ADMIN — BUPIVACAINE HYDROCHLORIDE 15 ML: 5 INJECTION, SOLUTION EPIDURAL; INTRACAUDAL; PERINEURAL at 11:48

## 2023-02-16 RX ADMIN — BUPIVACAINE HYDROCHLORIDE 15 ML: 5 INJECTION, SOLUTION EPIDURAL; INTRACAUDAL; PERINEURAL at 11:51

## 2023-02-16 RX ADMIN — SODIUM CHLORIDE, POTASSIUM CHLORIDE, SODIUM LACTATE AND CALCIUM CHLORIDE: 600; 310; 30; 20 INJECTION, SOLUTION INTRAVENOUS at 11:13

## 2023-02-16 RX ADMIN — Medication 2 G: at 12:33

## 2023-02-16 RX ADMIN — PROPOFOL 200 MG: 10 INJECTION, EMULSION INTRAVENOUS at 12:57

## 2023-02-16 RX ADMIN — DEXAMETHASONE SODIUM PHOSPHATE 5 MG: 10 INJECTION, SOLUTION INTRAMUSCULAR; INTRAVENOUS at 11:48

## 2023-02-16 RX ADMIN — DEXAMETHASONE SODIUM PHOSPHATE 5 MG: 10 INJECTION, SOLUTION INTRAMUSCULAR; INTRAVENOUS at 11:51

## 2023-02-16 RX ADMIN — Medication 30 MG: at 13:00

## 2023-02-16 ASSESSMENT — ACTIVITIES OF DAILY LIVING (ADL)
ADLS_ACUITY_SCORE: 35

## 2023-02-16 ASSESSMENT — LIFESTYLE VARIABLES: TOBACCO_USE: 1

## 2023-02-16 NOTE — ANESTHESIA PREPROCEDURE EVALUATION
Anesthesia Pre-Procedure Evaluation    Patient: Tere Camarena   MRN: [id number] : 1975        Procedure : Procedure(s):  ARTHROSCOPY, ANKLE, OPEN REPAIR OF ANKLE LIGAMENT, Osteochondral lesion of talus debridement &repair w/biocartilage          History reviewed. No pertinent past medical history.   Past Surgical History:   Procedure Laterality Date     ABDOMEN SURGERY      hernia surgery X5     BIOPSY BREAST NEEDLE LOCALIZATION Left 2017    Procedure: BIOPSY BREAST NEEDLE LOCALIZATION;  Surgeon: Sita Rushing MD;  Location: [facility]     BREAST SURGERY Left 2016    biopsy     BREAST SURGERY Left 2016    biopsy     COLONOSCOPY N/A 2021    Procedure: COLONOSCOPY, WITH POLYPECTOMY AND BIOPSY, and hemorrhoid banding;  Surgeon: Naeem Camarena MD;  Location: [facility] OR     GYN SURGERY      tubal ligation     ORTHOPEDIC SURGERY Left 10/31/2016    left knee medial retinacular repair, VMO advancement, and reapir of partial tear of quadriceps tendon     ORTHOPEDIC SURGERY Left 2017    left knee manipulation     TUBAL LIGATION        Allergies   Allergen Reactions     Peanuts [Peanut-Derived]      Eyes swell     Trees Other (See Comments)     Tree sap makes her eyes swell shut      Social History     Tobacco Use     Smoking status: Every Day     Packs/day: 1.00     Years: 20.00     Pack years: 20.00     Types: Cigarettes     Smokeless tobacco: Never   Substance Use Topics     Alcohol use: Not Currently     Alcohol/week: 5.0 standard drinks     Types: 5 Cans of beer per week     Comment:  weekly       Wt Readings from Last 1 Encounters:   23 84.4 kg (186 lb)        Anesthesia Evaluation   Pt has had prior anesthetic.         ROS/MED HX  ENT/Pulmonary:     (+) tobacco use, Current use,     Neurologic:     (+) migraines,     Cardiovascular:  - neg cardiovascular ROS     METS/Exercise Tolerance: >4 METS    Hematologic:  - neg hematologic  ROS     Musculoskeletal:   (+) fracture,  Fracture location: RLE,     GI/Hepatic:  - neg GI/hepatic ROS     Renal/Genitourinary:  - neg Renal ROS     Endo:  - neg endo ROS     Psychiatric/Substance Use:  - neg psychiatric ROS     Infectious Disease:  - neg infectious disease ROS     Malignancy:  - neg malignancy ROS     Other:  - neg other ROS          Physical Exam    Airway        Mallampati: II   TM distance: > 3 FB   Neck ROM: full   Mouth opening: > 3 cm    Respiratory Devices and Support         Dental       (+) Modest Abnormalities - crowns, retainers, 1 or 2 missing teeth      Cardiovascular   cardiovascular exam normal       Rhythm and rate: regular and normal     Pulmonary   pulmonary exam normal        breath sounds clear to auscultation           OUTSIDE LABS:  CBC:   Lab Results   Component Value Date    WBC 10.1 02/14/2023    WBC 6.9 08/16/2022    HGB 14.8 02/14/2023    HGB 15.0 08/16/2022    HCT 42.4 02/14/2023    HCT 43.2 08/16/2022     02/14/2023     08/16/2022     BMP:   Lab Results   Component Value Date     02/14/2023     08/16/2022    POTASSIUM 3.9 02/14/2023    POTASSIUM 4.3 08/16/2022    CHLORIDE 109 (H) 02/14/2023    CHLORIDE 103 08/16/2022    CO2 22 02/14/2023    CO2 28 08/16/2022    BUN 10.5 02/14/2023    BUN 7 08/16/2022    CR 0.70 02/14/2023    CR 0.74 08/16/2022    GLC 93 02/14/2023    GLC 90 08/16/2022     COAGS:   Lab Results   Component Value Date    PTT 30 11/11/2015    INR 0.90 11/11/2015     POC:   Lab Results   Component Value Date    HCG Negative 02/02/2021     HEPATIC:   Lab Results   Component Value Date    ALBUMIN 4.2 08/16/2022    PROTTOTAL 6.9 08/16/2022    ALT 13 08/16/2022    AST 20 08/16/2022    ALKPHOS 68 08/16/2022    BILITOTAL 0.6 08/16/2022     OTHER:   Lab Results   Component Value Date    A1C 4.9 04/08/2016    JEWELS 9.2 02/14/2023    MAG 1.7 11/11/2015    LIPASE 131 11/11/2015    TSH 3.17 04/25/2017    T4 0.97 04/25/2017    CRP <1.0 08/16/2022    SED 2 08/16/2022       Anesthesia  Plan    ASA Status:  2   NPO Status:  NPO Appropriate    Anesthesia Type: Peripheral Nerve Block.      Maintenance: TIVA.        Consents    Anesthesia Plan(s) and associated risks, benefits, and realistic alternatives discussed. Questions answered and patient/representative(s) expressed understanding.     - Discussed: Risks, Benefits and Alternatives for the PROCEDURE were discussed     - Discussed with:  Patient         Postoperative Care    Pain management: Peripheral nerve block (Single Shot).        Comments:                David Kellerman, APRN CRNA

## 2023-02-16 NOTE — ANESTHESIA PROCEDURE NOTES
"Adductor canal Procedure Note    Pre-Procedure   Staff -        CRNA: Kellerman, David, APRN CRNA       Performed By: CRNA       Location: pre-op       Procedure Start/Stop Times: 2/16/2023 11:49 AM and 2/16/2023 11:51 AM       Pre-Anesthestic Checklist: patient identified, IV checked, site marked, risks and benefits discussed, informed consent, monitors and equipment checked, pre-op evaluation, at physician/surgeon's request and post-op pain management  Timeout:       Correct Patient: Yes        Correct Procedure: Yes        Correct Site: Yes        Correct Position: Yes        Correct Laterality: Yes        Site Marked: Yes  Procedure Documentation  Procedure: Adductor canal       Diagnosis: RIGHT ANKLE FX       Patient Position: supine       Patient Prep/Sterile Barriers: sterile gloves, mask       Skin prep: Chloraprep       Needle Type: insulated and short bevel       Needle Gauge: 20.        Needle Length (Inches): 4        Needle Length (millimeters): 80        Ultrasound guided       1. Ultrasound was used to identify targeted nerve, plexus, vascular marker, or fascial plane and place a needle adjacent to it in real-time.       2. Ultrasound was used to visualize the spread of anesthetic in close proximity to the above referenced structure.       3. A permanent image is entered into the patient's record.       4. The visualized anatomic structures appeared normal.       5. There were no apparent abnormal pathologic findings.    Assessment/Narrative         The placement was negative for: blood aspirated, painful injection and site bleeding       Paresthesias: No.       Bolus given via needle..        Secured via.        Insertion/Infusion Method: Single Shot       Complications: none    Medication(s) Administered   Medication Administration Time: 2/16/2023 11:49 AM      FOR [facility] ([facility] ONLY:   Pain Team Contact information: please page the Pain Team Via [place] [facility]. Search \"Pain\". During daytime hours, " please page the attending first. At night please page the resident first.

## 2023-02-16 NOTE — BRIEF OP NOTE
[facility]    Brief Operative Note    Pre-operative diagnosis: Pain in joint involving ankle and foot, right [M25.571]  Osteochondral lesion [M89.9, M94.9]  Post-operative diagnosis Same as pre-operative diagnosis    Procedure: Procedure(s):  ARTHROSCOPY, ANKLE, OPEN REPAIR OF ANKLE LIGAMENT, Osteochondral lesion of talus debridement  Surgeon: Surgeon(s) and Role:     * Jose David Sibley DPM - Primary     * Reanna Lazo PA-C - Assisting  Anesthesia: General with Block   Estimated Blood Loss: Minimal    Drains: None  Specimens: * No specimens in log *  Findings:   None.  Complications: None.  Implants:   Implant Name Type Inv. Item Serial No.  Lot No. LRB No. Used Action   IMP SUTURE ANCHOR DX FIBERTAK W/ST & NDLS AR-[id number] - [id number] Metallic [employer]ware/[employer] IMP SUTURE ANCHOR DX FIBERTAK W/ST & NDLS AR-8990ST  ARTHREX [id number] Right 1 Implanted   IMP SUTURE ANCHOR DX FIBERTAK W/ST & NDLS AR-8990ST - [id number] Metallic Hardware/[employer] IMP SUTURE ANCHOR DX FIBERTAK W/ST & NDLS AR-8990ST  ARTHREX [id number] Right 1 Implanted   IMP ANCHOR ARTHREX BIOCOMP PUSHLOCK DX 2.9MM AR-[id number] - [id number] Metallic Hardware/[employer] IMP ANCHOR ARTHREX BIOCOMP PUSHLOCK DX 2.9MM [id number]  ARTHREX [id number] Right 1 Implanted

## 2023-02-16 NOTE — OP NOTE
Procedure Date: 02/16/2023    SURGEON:  Jose David Sibley MD    ASSISTANT:  Reanna Lazo PA-C.  A skilled first assistant was necessary due to technical complexity of the procedure and for the patient's safety.  The assistant helped with positioning, retraction, visualization of the operative field and moreover helped to complete the procedure in a technically safe and efficient manner.       PREOPERATIVE DIAGNOSES:  Right ankle pain, instability and an OCD lesion.    POSTOPERATIVE DIAGNOSES:  Right ankle pain, instability and an OCD lesion.    PROCEDURE:     1.  Right ankle scope with extensive debridement.  2.  Osteochondral lesion, debridement of talus.  3.  Open lateral ligament repair, both ligaments.    ANESTHESIA:  General with regional.    HEMOSTASIS:  None.    ESTIMATED BLOOD LOSS:  20 mL    MATERIALS:  Arthrex FiberTak anchor x 2, Arthrex 2.9 PushLock anchor.    INDICATIONS FOR PROCEDURE:  The patient has longstanding pain associated with described pathology, failed nonoperative management and elected to proceed with surgery after detailed discussion of surgery, recovery, risks, potential complications, alternatives and benefits.    DESCRIPTION OF PROCEDURE:  In the supine position, utilized MAC anesthesia, local block performed.  Right lower extremity prepped and draped in the usual fashion.  No exsanguination or tourniquet was utilized.    Attention directed to the right anterior ankle.  Standard anteromedial and anterolateral ankle portals established.  Fluoroscope introduced medially, 3.5 shaver laterally. Tibial spurring and fibular spurring resected with a bone cutting shaver. Extensive debridement carried out within the joint capsule laterally.  Distal syndesmosis, posterior capsule anteromedially also had significant inflammatory changes both acute and chronic. Extensive debridement carried out.  Once debridement carried out, further evaluation of the joint performed.  There was a large medial  lesion as confirmed on MRI.  This was debrided of all loose cartilage.  There was a large loose trunk, which was debrided to healthy attached peripheral cartilage.  It was a fairly sizable lesion, I think about 1 cm x 1 cm.  I think this is even fairly accurate on the MRI.  Once we had healthy peripheral cartilage, the bone was microfractured with a microfracture pick to induce bleeding and fibrocartilage formation.  This joint was then flushed with copious amounts of normal saline.  Arthroscopic portals closed.    Attention was then directed lateral ankle and incision made in an oblique fashion from the fibula down to the sinus tarsi, allowing access to the lateral ligamentous complex.  Joint capsule and ligamentous complex were transected along the distal curvature of the fibula. Anterior fibula was roughened with a rongeur.  Two FiberTak anchors were inserted and space 1 cm apart, both at distal tibiotalar joint.  Proximal sutures passed through the ATFL and joint capsule respectively, distally through the CFL and peroneal retinaculum respectively.  These were thrown in a fashion to imbricate and shorten or tighten these structures, tighten down the ankle held in neutral everted position, effectively tightening the ligamentous complex.  This was then augmented in double row fashion by gathering suture ends and the 2.9 PushLock anchor was then secured onto the fibula under appropriate tension and flushed with saline.  Deep tissue repaired with 2-0 Vicryl and skin nonabsorbable suture, placed in sterile dressing, posterior sugar tong splint.  She will be nonweightbearing and follow up as scheduled.    Jose David Sibley DPM        D: 2023   T: 2023   MT: JEFFREY    Name:     JAY MORALES  MRN:      [id number]        Account:        [id number]   :      1975           Procedure Date: 2023     Document: [id number]

## 2023-02-16 NOTE — INTERVAL H&P NOTE
"I have reviewed the surgical (or preoperative) H&P that is linked to this encounter, and examined the patient. There are no significant changes    Clinical Conditions Present on Arrival:  Clinically Significant Risk Factors Present on Admission                    # Overweight: Estimated body mass index is 28.28 kg/m  as calculated from the following:    Height as of this encounter: 1.727 m (5' 8\").    Weight as of this encounter: 84.4 kg (186 lb).       "

## 2023-02-16 NOTE — ANESTHESIA CARE TRANSFER NOTE
Patient: Tere Camarena    Procedure: Procedure(s):  ARTHROSCOPY, ANKLE, OPEN REPAIR OF ANKLE LIGAMENT, Osteochondral lesion of talus debridement       Diagnosis: Pain in joint involving ankle and foot, right [M25.571]  Osteochondral lesion [M89.9, M94.9]  Diagnosis Additional Information: No value filed.    Anesthesia Type:   Peripheral Nerve Block     Note:    Oropharynx: oropharynx clear of all foreign objects and spontaneously breathing  Level of Consciousness: drowsy  Oxygen Supplementation: face mask  Level of Supplemental Oxygen (L/min / FiO2): 6  Independent Airway: airway patency satisfactory and stable  Dentition: dentition unchanged  Vital Signs Stable: post-procedure vital signs reviewed and stable  Report to RN Given: handoff report given  Patient transferred to: PACU    Handoff Report: Identifed the Patient, Identified the Reponsible Provider, Reviewed the pertinent medical history, Discussed the surgical course, Reviewed Intra-OP anesthesia mangement and issues during anesthesia, Set expectations for post-procedure period and Allowed opportunity for questions and acknowledgement of understanding      Vitals:  Vitals Value Taken Time   BP     Temp     Pulse     Resp     SpO2 95 % 02/16/23 1411   Vitals shown include unvalidated device data.    Electronically Signed By: WANDA PETERSON CRNA  February 16, 2023  2:12 PM

## 2023-02-16 NOTE — ANESTHESIA PROCEDURE NOTES
Sciatic Procedure Note    Pre-Procedure   Staff -        CRNA: Kellerman, David, APRN CRNA       Performed By: CRNA       Location: pre-op       Procedure Start/Stop Times: 2/16/2023 11:45 AM and 2/16/2023 11:48 AM       Pre-Anesthestic Checklist: patient identified, IV checked, site marked, risks and benefits discussed, informed consent, monitors and equipment checked, pre-op evaluation, at physician/surgeon's request and post-op pain management  Timeout:       Correct Patient: Yes        Correct Procedure: Yes        Correct Site: Yes        Correct Position: Yes        Correct Laterality: Yes        Site Marked: Yes  Procedure Documentation  Procedure: Sciatic       Diagnosis: RIGHT ANKLE FX       Laterality: right       Patient Position: supine       Patient Prep/Sterile Barriers: sterile gloves, mask       Skin prep: Chloraprep (popliteal approach).       Needle Type: insulated and short bevel       Needle Gauge: 20.        Needle Length (Inches): 4        Needle Length (millimeters): 80        Ultrasound guided       1. Ultrasound was used to identify targeted nerve, plexus, vascular marker, or fascial plane and place a needle adjacent to it in real-time.       2. Ultrasound was used to visualize the spread of anesthetic in close proximity to the above referenced structure.       3. A permanent image is entered into the patient's record.       4. The visualized anatomic structures appeared normal.       5. There were no apparent abnormal pathologic findings.    Assessment/Narrative         The placement was negative for: blood aspirated, painful injection and site bleeding       Paresthesias: No.       Bolus given via needle..        Secured via.        Insertion/Infusion Method: Single Shot       Complications: none    Medication(s) Administered   Medication Administration Time: 2/16/2023 11:45 AM      FOR [facility] ([facility]) ONLY:   Pain Team Contact information: please page the Pain Team Via [employer]. Search  "\"Pain\". During daytime hours, please page the attending first. At night please page the resident first.    "

## 2023-02-16 NOTE — OR NURSING
Tere, patient, has been discharged to home at 1535 via wheelchair accompanied by henri, significant other.    Written discharge instructions were provided to patient and Henri.  Prescriptions were sent to pharmacy, picked up by significant other before discharge.  Patient states their pain is 0/10, and denies any nausea or dizziness upon discharge.    Patient and adult caring for them verbalize understanding of discharge instructions including no driving until tomorrow and no longer taking narcotic pain medications - no operating mechanical equipment and no making any important decisions.They understand reason for discharge, and necessary follow-up appointments.

## 2023-02-16 NOTE — ANESTHESIA PROCEDURE NOTES
Airway       Patient location during procedure: OR       Procedure Start/Stop Times: 2/16/2023 12:58 PM and 2/16/2023 12:58 PM  Staff -        CRNA: Nathaniel King APRN CRNA       Performed By: CRNAIndications and Patient Condition       Indications for airway management: kelly-procedural       Induction type:intravenous       Mask difficulty assessment: 1 - vent by mask    Final Airway Details       Final airway type: supraglottic airway    Supraglottic Airway Details        Type: LMA       Brand: Ambu AuraGain       LMA size: 3    Post intubation assessment        Placement verified by: capnometry        Number of attempts at approach: 1       Secured with: silk tape       Ease of procedure: easy       Dentition: Intact and Unchanged    Medication(s) Administered   Medication Administration Time: 2/16/2023 12:58 PM

## 2023-02-16 NOTE — DISCHARGE INSTRUCTIONS
[facility]  Adult Discharge Orders & Instructions      For 24 hours after surgery:  Get plenty of rest.  A responsible adult must stay with you for at least 24 hours after you leave the hospital.   You may feel lightheaded.  IF so, sit for a few minutes before standing.  Have someone help you get up.   You may have a slight fever. Call the doctor if your fever is over 101 F (38.3 C) (taken under the tongue) or lasts longer than 24 hours.  You may have a dry mouth, a sore throat, muscle aches or trouble sleeping.  These should go away after 24 hours.  Do not make important or legal decisions.  6.   Do not drive or use heavy equipment.  If you have weakness or tingling, don't drive or use heavy equipment until this feeling goes away.                                                                                                                                                                         To contact a doctor, call    [phone]    Surgeon Contact Information    If you have questions or concerns related to your procedure please contact your surgeon through [facility] at [phone].

## 2023-02-17 LAB
ATRIAL RATE - MUSE: 48 BPM
DIASTOLIC BLOOD PRESSURE - MUSE: NORMAL MMHG
INTERPRETATION ECG - MUSE: NORMAL
P AXIS - MUSE: 44 DEGREES
PR INTERVAL - MUSE: 144 MS
QRS DURATION - MUSE: 88 MS
QT - MUSE: 446 MS
QTC - MUSE: 398 MS
R AXIS - MUSE: 40 DEGREES
SYSTOLIC BLOOD PRESSURE - MUSE: NORMAL MMHG
T AXIS - MUSE: 41 DEGREES
VENTRICULAR RATE- MUSE: 48 BPM

## 2023-02-17 NOTE — ANESTHESIA POSTPROCEDURE EVALUATION
Patient: Tere Camarena    Procedure: Procedure(s):  ARTHROSCOPY, ANKLE, OPEN REPAIR OF ANKLE LIGAMENT, Osteochondral lesion of talus debridement       Anesthesia Type:  Peripheral Nerve Block    Note:  Disposition: Outpatient   Postop Pain Control:            Sign Out: Well controlled pain   PONV: No   Neuro/Psych:             Sign Out: Acceptable/Baseline neuro status   Airway/Respiratory:             Sign Out: Acceptable/Baseline resp. status   CV/Hemodynamics:             Sign Out: Acceptable CV status   Other NRE: NONE   DID A NON-ROUTINE EVENT OCCUR? No           Last vitals:  Vitals Value Taken Time   /72 02/16/23 1430   Temp 98.1  F (36.7  C) 02/16/23 1411   Pulse 60 02/16/23 1432   Resp 12 02/16/23 1432   SpO2 95 % 02/16/23 1432   Vitals shown include unvalidated device data.    Electronically Signed By: David Kellerman, APRN CRNA  February 16, 2023  6:43 PM

## 2023-02-23 ENCOUNTER — OFFICE VISIT (OUTPATIENT)
Dept: ORTHOPEDICS | Facility: OTHER | Age: 48
End: 2023-02-23
Attending: PODIATRIST
Payer: COMMERCIAL

## 2023-02-23 DIAGNOSIS — Z09 POSTOPERATIVE FOLLOW-UP: Primary | ICD-10-CM

## 2023-02-23 PROCEDURE — 99024 POSTOP FOLLOW-UP VISIT: CPT | Performed by: PODIATRIST

## 2023-02-23 PROCEDURE — G0463 HOSPITAL OUTPT CLINIC VISIT: HCPCS

## 2023-02-23 NOTE — PROGRESS NOTES
Patient is here for follow up on her right ankle scope.  Mendy Dunbar LPN .....................2/23/2023 2:47 PM

## 2023-02-23 NOTE — PROGRESS NOTES
Visit Date: 2023    Tere is here a week out from ankle scope, OCD, debridement, ankle ligament repair. Doing well, no acute concern.    PHYSICAL EXAMINATION:  Surgical site well healed.  Sutures removed.  Steri-Strips applied.  CMS intact.  No concerns clinically.  Good stability of the ankle.    ASSESSMENT:  Status post right ankle surgery as described.    PLAN:  Discussed progression and treatment. The patient is doing well.  She was progressed into a boot.  She will do range of motion exercises and progress walking as able.  I will see her back in a month.  We will get some standing x-rays at that time given OCD debridement. Start therapy if doing well.  Again, range of motion exercises and can get her foot wet starting tomorrow.  Otherwise, she should be in a boot for weightbearing.    Jose David Sibley DPM        D: 2023   T: 2023   MT: [id number]    Name:     TERE MORALES  MRN:      [id number]        Account:    [id number]   :      1975           Visit Date: 2023     Document: [id number]

## 2023-03-23 ENCOUNTER — OFFICE VISIT (OUTPATIENT)
Dept: ORTHOPEDICS | Facility: OTHER | Age: 48
End: 2023-03-23
Attending: PODIATRIST
Payer: COMMERCIAL

## 2023-03-23 DIAGNOSIS — M72.2 PLANTAR FASCIITIS: ICD-10-CM

## 2023-03-23 DIAGNOSIS — Z09 POSTOPERATIVE FOLLOW-UP: Primary | ICD-10-CM

## 2023-03-23 PROCEDURE — G0463 HOSPITAL OUTPT CLINIC VISIT: HCPCS

## 2023-03-23 PROCEDURE — 250N000011 HC RX IP 250 OP 636: Mod: JW | Performed by: PODIATRIST

## 2023-03-23 PROCEDURE — 20551 NJX 1 TENDON ORIGIN/INSJ: CPT | Mod: RT | Performed by: PODIATRIST

## 2023-03-23 PROCEDURE — G0463 HOSPITAL OUTPT CLINIC VISIT: HCPCS | Mod: 25

## 2023-03-23 PROCEDURE — 250N000009 HC RX 250: Performed by: PODIATRIST

## 2023-03-23 PROCEDURE — 20551 NJX 1 TENDON ORIGIN/INSJ: CPT | Mod: RT,78

## 2023-03-23 RX ORDER — LIDOCAINE HYDROCHLORIDE 10 MG/ML
1 INJECTION, SOLUTION EPIDURAL; INFILTRATION; INTRACAUDAL; PERINEURAL ONCE
Status: COMPLETED | OUTPATIENT
Start: 2023-03-23 | End: 2023-03-23

## 2023-03-23 RX ORDER — TRIAMCINOLONE ACETONIDE 40 MG/ML
20 INJECTION, SUSPENSION INTRA-ARTICULAR; INTRAMUSCULAR ONCE
Status: COMPLETED | OUTPATIENT
Start: 2023-03-23 | End: 2023-03-23

## 2023-03-23 RX ADMIN — TRIAMCINOLONE ACETONIDE 20 MG: 40 INJECTION, SUSPENSION INTRA-ARTICULAR; INTRAMUSCULAR at 14:11

## 2023-03-23 RX ADMIN — LIDOCAINE HYDROCHLORIDE 1 ML: 10 INJECTION, SOLUTION EPIDURAL; INFILTRATION; INTRACAUDAL; PERINEURAL at 14:11

## 2023-03-23 NOTE — PROGRESS NOTES
SUBJECTIVE:  Landy returns 5 weeks out from ankle scope with OCD debridement lateral ligament repair.  She states she has constant aching pain in her heel anytime she steps on it she gets sharp shooting pain.  It sounds as though she is developing plantar fasciitis in the boot which were again addressed today in addition to a postop office visit.  Otherwise ankle feels good she is ready to progress out of the boot start some therapy.    ROS: Musculoskeletal and general review of systems are negative, per review of previous clinic questionnaire.  Denies SOB and calf pain.    EXAM:   The surgical site looks good she has good stability ankle really no symptoms around the ankle.  She does have equinus she is very tender to plantar medial instep of the heel consistent with plantar fascia symptoms     ASSESSMENT: Status post ankle scope OCD debridement lateral ligament repair right side.  New diagnosis of plantar fasciitis.    PLAN: In regards to postop she can progress into a brace start therapy for strengthening sagittal plane and active range of motion and gait training.  We did an injection for her plantar fascia today she is quite symptomatic here hopefully get this to calm down as she progresses.  She will also work on calf stretches for this.  I will see her back in 4 weeks.    Procedure: Patient's right plantar medial instep of heel prepped with alcohol.  Injected at this interval at the insertion of the plantar fascia to calcaneus with 20 mg of Kenalog 1 cc of lidocaine plain.  Sterile Band-Aid applied.  Procedure tolerated well.

## 2023-03-23 NOTE — PROGRESS NOTES
Patient is here for follow up on her right ankle scope s/p 5 weeks.    Jennifer King LPN .......  3/23/2023  1:42 PM

## 2023-04-27 ENCOUNTER — OFFICE VISIT (OUTPATIENT)
Dept: ORTHOPEDICS | Facility: OTHER | Age: 48
End: 2023-04-27
Attending: PODIATRIST
Payer: COMMERCIAL

## 2023-04-27 DIAGNOSIS — Z09 POSTOPERATIVE FOLLOW-UP: Primary | ICD-10-CM

## 2023-04-27 PROCEDURE — 99024 POSTOP FOLLOW-UP VISIT: CPT | Performed by: PODIATRIST

## 2023-04-27 PROCEDURE — G0463 HOSPITAL OUTPT CLINIC VISIT: HCPCS

## 2023-04-27 NOTE — PROGRESS NOTES
Patient is here for follow up right ankle scope s/p 10 weeks.    Jennifer King LPN .......  4/27/2023  2:46 PM

## 2023-04-27 NOTE — PROGRESS NOTES
SUBJECTIVE:  Landy is here 10 weeks out from ankle scope ligament repair OCD debridement.  We also treated her for plantar fascia since last visit.  Injection helped for a week and then slowly sent back in.  She has significant plantar fascia pain still.  Also endorses some weakness at the ankles not started therapy at this point.    ROS: Musculoskeletal and general review of systems are negative, per review of previous clinic questionnaire.  Denies SOB and calf pain.    EXAM:   Surgery site is well-healed good stability of the ankle she does have plantar fascia symptoms present still.    IMAGING: No imaging today    ASSESSMENT: Status post ankle surgery as described ongoing plantar fasciitis    PLAN: Order provided for therapy evaluate and treat for plantar fasciitis as well as treatment of postoperatively for said procedure.  Work on strengthening range of motion gait training.  I will see her back in 4 to 6 weeks release if doing well at that time.    Jose David Sibley DPM

## 2023-08-07 ENCOUNTER — OFFICE VISIT (OUTPATIENT)
Dept: FAMILY MEDICINE | Facility: OTHER | Age: 48
End: 2023-08-07
Payer: COMMERCIAL

## 2023-08-07 VITALS
WEIGHT: 190.1 LBS | OXYGEN SATURATION: 98 % | HEART RATE: 67 BPM | BODY MASS INDEX: 28.81 KG/M2 | DIASTOLIC BLOOD PRESSURE: 74 MMHG | RESPIRATION RATE: 18 BRPM | HEIGHT: 68 IN | TEMPERATURE: 97.5 F | SYSTOLIC BLOOD PRESSURE: 130 MMHG

## 2023-08-07 DIAGNOSIS — K08.89 PAIN, DENTAL: ICD-10-CM

## 2023-08-07 DIAGNOSIS — K04.7 DENTAL INFECTION: Primary | ICD-10-CM

## 2023-08-07 PROCEDURE — 250N000011 HC RX IP 250 OP 636: Mod: JZ

## 2023-08-07 PROCEDURE — 99213 OFFICE O/P EST LOW 20 MIN: CPT

## 2023-08-07 PROCEDURE — G0463 HOSPITAL OUTPT CLINIC VISIT: HCPCS | Mod: 25

## 2023-08-07 PROCEDURE — G0463 HOSPITAL OUTPT CLINIC VISIT: HCPCS

## 2023-08-07 PROCEDURE — 96372 THER/PROPH/DIAG INJ SC/IM: CPT

## 2023-08-07 RX ORDER — KETOROLAC TROMETHAMINE 30 MG/ML
30 INJECTION, SOLUTION INTRAMUSCULAR; INTRAVENOUS ONCE
Status: COMPLETED | OUTPATIENT
Start: 2023-08-07 | End: 2023-08-07

## 2023-08-07 RX ADMIN — KETOROLAC TROMETHAMINE 30 MG: 30 INJECTION, SOLUTION INTRAMUSCULAR; INTRAVENOUS at 11:00

## 2023-08-07 ASSESSMENT — ANXIETY QUESTIONNAIRES
5. BEING SO RESTLESS THAT IT IS HARD TO SIT STILL: NOT AT ALL
2. NOT BEING ABLE TO STOP OR CONTROL WORRYING: NOT AT ALL
1. FEELING NERVOUS, ANXIOUS, OR ON EDGE: NOT AT ALL
6. BECOMING EASILY ANNOYED OR IRRITABLE: NOT AT ALL
7. FEELING AFRAID AS IF SOMETHING AWFUL MIGHT HAPPEN: NOT AT ALL
IF YOU CHECKED OFF ANY PROBLEMS ON THIS QUESTIONNAIRE, HOW DIFFICULT HAVE THESE PROBLEMS MADE IT FOR YOU TO DO YOUR WORK, TAKE CARE OF THINGS AT HOME, OR GET ALONG WITH OTHER PEOPLE: NOT DIFFICULT AT ALL
GAD7 TOTAL SCORE: 0
3. WORRYING TOO MUCH ABOUT DIFFERENT THINGS: NOT AT ALL
4. TROUBLE RELAXING: NOT AT ALL
GAD7 TOTAL SCORE: 0

## 2023-08-07 ASSESSMENT — PAIN SCALES - GENERAL: PAINLEVEL: EXTREME PAIN (8)

## 2023-08-07 NOTE — PROGRESS NOTES
ASSESSMENT/PLAN:    (K04.7) Dental infection  (primary encounter diagnosis); (K08.89) Pain, dental  Comment: Presents with 2 days of dental pain.  She notes that its been worsening.  Upper left molar with pain, swelling, no drainage or abscess.  On exam upper left back molar with impaired dentition, erythema and swelling to gum line. No abscess.  She has no medication allergies.  Augmentin selected for treatment.  Toradol given for pain.  I recommend follow-up with dentist as soon as possible.  Plan: amoxicillin-clavulanate (AUGMENTIN) 875-125 MG         tablet  ketorolac (TORADOL) injection 30 mg  Take antibiotic as ordered.  Take daily probiotic while on this medication.  You may take the medication with food.  Follow-up with your dentist as soon as possible.    Discussed warning signs/symptoms indicative of need to f/u    Follow up if symptoms persist or worsen or concerns    I have reviewed the nursing notes.  I have reviewed the findings, diagnosis, plan and need for follow up with the patient.    I explained my diagnostic considerations and recommendations to the patient, who voiced understanding and agreement with the treatment plan. All questions were answered. We discussed potential side effects of any prescribed or recommended therapies, as well as expectations for response to treatments.    ENRIQUE MACK, WANDA CNP  8/7/2023  10:27 AM    HPI:    Tere Camarena is a 48 year old female  who presents to [facility] today for concerns of tooth pain.    Upper molar with pain, swelling, no drainage or abscess.  Onset started 2 days ago. no fevers. No local dentist.   Appointment scheduled in [place].     No known medication allergies.      History reviewed. No pertinent past medical history.  Past Surgical History:   Procedure Laterality Date    ABDOMEN SURGERY      hernia surgery X5    ARTHROSCOPY ANKLE, OPEN REPAIR LIGAMENT, COMBINED Right 2/16/2023    Procedure: ARTHROSCOPY, ANKLE, OPEN REPAIR OF ANKLE  LIGAMENT, Osteochondral lesion of talus debridement;  Surgeon: Jose David Sibley DPM;  Location: [facility] OR    BIOPSY BREAST NEEDLE LOCALIZATION Left 01/30/2017    Procedure: BIOPSY BREAST NEEDLE LOCALIZATION;  Surgeon: Sita Rushing MD;  Location: [facility] OR    BREAST SURGERY Left 09/28/2016    biopsy    BREAST SURGERY Left 09/13/2016    biopsy    COLONOSCOPY N/A 02/18/2021    Procedure: COLONOSCOPY, WITH POLYPECTOMY AND BIOPSY, and hemorrhoid banding;  Surgeon: Naeem Camarena MD;  Location: [facility] OR    GYN SURGERY  2004    tubal ligation    ORTHOPEDIC SURGERY Left 10/31/2016    left knee medial retinacular repair, VMO advancement, and reapir of partial tear of quadriceps tendon    ORTHOPEDIC SURGERY Left 04/18/2017    left knee manipulation    TUBAL LIGATION       Social History     Tobacco Use    Smoking status: Every Day     Packs/day: 1.00     Years: 20.00     Pack years: 20.00     Types: Cigarettes    Smokeless tobacco: Never   Substance Use Topics    Alcohol use: Not Currently     Alcohol/week: 5.0 standard drinks of alcohol     Types: 5 Cans of beer per week     Comment:  weekly      Current Outpatient Medications   Medication Sig Dispense Refill    oxyCODONE (ROXICODONE) 5 MG tablet Take 1 tablet (5 mg) by mouth every 6 hours as needed for moderate to severe pain 16 tablet 0    SUMAtriptan (IMITREX) 50 MG tablet Take 1 tablet (50 mg) by mouth at onset of headache for migraine May repeat in 2 hours. Max 4 tablets/24 hours. 30 tablet 3    topiramate (TOPAMAX) 25 MG tablet Take 1 tablet in morning and 2 tablets in evening 90 tablet 3     Allergies   Allergen Reactions    Peanuts [Peanut-Containing Drug Products]      Eyes swell    Trees Other (See Comments)     Tree sap makes her eyes swell shut     Past medical history, past surgical history, current medications and allergies reviewed and accurate to the best of my knowledge.      ROS:  Refer to HPI    /74 (BP Location: Left arm, Patient Position: Sitting, Cuff  "Size: Adult Regular)   Pulse 67   Temp 97.5  F (36.4  C) (Temporal)   Resp 18   Ht 1.727 m (5' 8\")   Wt 86.2 kg (190 lb 1.6 oz)   SpO2 98%   BMI 28.90 kg/m      EXAM:  General Appearance: Well appearing 48 year old female, appropriate appearance for age. No acute distress   Ears: Left TM intact, translucent with bony landmarks appreciated, no erythema, no effusion, no bulging, no purulence.  Right TM intact, translucent with bony landmarks appreciated, no erythema, no effusion, no bulging, no purulence.  Left auditory canal clear.  Right auditory canal clear.  Normal external ears, non tender.  Eyes: conjunctivae normal without erythema or irritation, corneas clear, no drainage or crusting, no eyelid swelling, pupils equal   Oropharynx: moist mucous membranes, posterior pharynx without erythema, no exudates or petechiae, no post nasal drip seen, no trismus, voice clear.  Upper left back molar with impaired dentition, erythema and swelling to gum line. No abscess.   Sinuses:  No sinus tenderness upon palpation of the frontal or maxillary sinuses  Nose:  Bilateral nares: no erythema, no edema, no drainage or congestion   Neck: supple without adenopathy  Respiratory: normal chest wall and respirations.  Normal effort.  Clear to auscultation bilaterally, no wheezing, crackles or rhonchi.  No increased work of breathing.  No cough appreciated.  Cardiac: RRR with no murmurs  Musculoskeletal:  Equal movement of bilateral upper extremities.  Equal movement of bilateral lower extremities.  Normal gait.    Dermatological: no rashes noted of exposed skin  Neuro: Alert and oriented to person, place, and time.  Cranial nerves II-XII grossly intact with no focal or lateralizing deficits.  Muscle tone normal.  Gait normal. No tremor.   Psychological: normal affect, alert, oriented, and pleasant.       "

## 2023-08-07 NOTE — PATIENT INSTRUCTIONS
Take antibiotic as ordered.  Take daily probiotic while on this medication.  You may take the medication with food.  Follow-up with your dentist as soon as possible.

## 2023-08-07 NOTE — NURSING NOTE
"No chief complaint on file.        Initial /74 (BP Location: Left arm, Patient Position: Sitting, Cuff Size: Adult Regular)   Pulse 67   Temp 97.5  F (36.4  C) (Temporal)   Resp 18   Ht 1.727 m (5' 8\")   Wt 86.2 kg (190 lb 1.6 oz)   SpO2 98%   BMI 28.90 kg/m   Estimated body mass index is 28.9 kg/m  as calculated from the following:    Height as of this encounter: 1.727 m (5' 8\").    Weight as of this encounter: 86.2 kg (190 lb 1.6 oz).     Advance Care Directive on file? no  Advance Care Directive provided to patient? no    FOOD SECURITY SCREENING QUESTIONS:    The next two questions are to help us understand your food security.  If you are feeling you need any assistance in this area, we have resources available to support you today.    Hunger Vital Signs:  Within the past 12 months we worried whether our food would run out before we got money to buy more. Never  Within the past 12 months the food we bought just didn't last and we didn't have money to get more. Never  Prema Colbert LPN on 8/7/2023 at 10:23 AM      Prema Colbert     "

## 2023-09-09 ENCOUNTER — HEALTH MAINTENANCE LETTER (OUTPATIENT)
Age: 48
End: 2023-09-09

## 2023-09-11 ENCOUNTER — HOSPITAL ENCOUNTER (OUTPATIENT)
Dept: GENERAL RADIOLOGY | Facility: OTHER | Age: 48
Discharge: HOME | End: 2023-09-11
Attending: PHYSICIAN ASSISTANT
Payer: COMMERCIAL

## 2023-09-11 ENCOUNTER — OFFICE VISIT (OUTPATIENT)
Dept: FAMILY MEDICINE | Facility: OTHER | Age: 48
End: 2023-09-11
Attending: PHYSICIAN ASSISTANT
Payer: COMMERCIAL

## 2023-09-11 VITALS
TEMPERATURE: 97.6 F | DIASTOLIC BLOOD PRESSURE: 76 MMHG | OXYGEN SATURATION: 97 % | SYSTOLIC BLOOD PRESSURE: 128 MMHG | BODY MASS INDEX: 29.67 KG/M2 | HEART RATE: 63 BPM | WEIGHT: 195.8 LBS | HEIGHT: 68 IN | RESPIRATION RATE: 14 BRPM

## 2023-09-11 DIAGNOSIS — Z13.220 LIPID SCREENING: ICD-10-CM

## 2023-09-11 DIAGNOSIS — G89.29 CHRONIC LEFT SHOULDER PAIN: ICD-10-CM

## 2023-09-11 DIAGNOSIS — R00.2 PALPITATIONS: ICD-10-CM

## 2023-09-11 DIAGNOSIS — M25.512 CHRONIC LEFT SHOULDER PAIN: ICD-10-CM

## 2023-09-11 DIAGNOSIS — Z00.00 ROUTINE HISTORY AND PHYSICAL EXAMINATION OF ADULT: Primary | ICD-10-CM

## 2023-09-11 DIAGNOSIS — J06.9 VIRAL URI: ICD-10-CM

## 2023-09-11 DIAGNOSIS — Z13.1 SCREENING FOR DIABETES MELLITUS: ICD-10-CM

## 2023-09-11 DIAGNOSIS — Z12.4 CERVICAL CANCER SCREENING: ICD-10-CM

## 2023-09-11 DIAGNOSIS — Z12.31 ENCOUNTER FOR SCREENING MAMMOGRAM FOR BREAST CANCER: ICD-10-CM

## 2023-09-11 LAB
ALBUMIN SERPL BCG-MCNC: 4.2 G/DL (ref 3.5–5.2)
ALP SERPL-CCNC: 86 U/L (ref 35–104)
ALT SERPL W P-5'-P-CCNC: 26 U/L (ref 0–50)
ANION GAP SERPL CALCULATED.3IONS-SCNC: 10 MMOL/L (ref 7–15)
AST SERPL W P-5'-P-CCNC: 32 U/L (ref 0–45)
BASOPHILS # BLD AUTO: 0.1 10E3/UL (ref 0–0.2)
BASOPHILS NFR BLD AUTO: 1 %
BILIRUB SERPL-MCNC: 0.5 MG/DL
BUN SERPL-MCNC: 12.2 MG/DL (ref 6–20)
CALCIUM SERPL-MCNC: 9.6 MG/DL (ref 8.6–10)
CHLORIDE SERPL-SCNC: 101 MMOL/L (ref 98–107)
CHOLEST SERPL-MCNC: 157 MG/DL
CREAT SERPL-MCNC: 0.84 MG/DL (ref 0.51–0.95)
DEPRECATED HCO3 PLAS-SCNC: 27 MMOL/L (ref 22–29)
EGFRCR SERPLBLD CKD-EPI 2021: 85 ML/MIN/1.73M2
EOSINOPHIL # BLD AUTO: 0.2 10E3/UL (ref 0–0.7)
EOSINOPHIL NFR BLD AUTO: 2 %
ERYTHROCYTE [DISTWIDTH] IN BLOOD BY AUTOMATED COUNT: 15 % (ref 10–15)
GLUCOSE SERPL-MCNC: 87 MG/DL (ref 70–99)
HBA1C MFR BLD: 5 % (ref 4–6.2)
HCT VFR BLD AUTO: 42.8 % (ref 35–47)
HDLC SERPL-MCNC: 49 MG/DL
HGB BLD-MCNC: 15.2 G/DL (ref 11.7–15.7)
IMM GRANULOCYTES # BLD: 0 10E3/UL
IMM GRANULOCYTES NFR BLD: 0 %
LDLC SERPL CALC-MCNC: 73 MG/DL
LYMPHOCYTES # BLD AUTO: 2.3 10E3/UL (ref 0.8–5.3)
LYMPHOCYTES NFR BLD AUTO: 24 %
MAGNESIUM SERPL-MCNC: 1.7 MG/DL (ref 1.7–2.3)
MCH RBC QN AUTO: 36.1 PG (ref 26.5–33)
MCHC RBC AUTO-ENTMCNC: 35.5 G/DL (ref 31.5–36.5)
MCV RBC AUTO: 102 FL (ref 78–100)
MONOCYTES # BLD AUTO: 0.6 10E3/UL (ref 0–1.3)
MONOCYTES NFR BLD AUTO: 7 %
NEUTROPHILS # BLD AUTO: 6.2 10E3/UL (ref 1.6–8.3)
NEUTROPHILS NFR BLD AUTO: 66 %
NONHDLC SERPL-MCNC: 108 MG/DL
NRBC # BLD AUTO: 0 10E3/UL
NRBC BLD AUTO-RTO: 0 /100
PLATELET # BLD AUTO: 191 10E3/UL (ref 150–450)
POTASSIUM SERPL-SCNC: 4.2 MMOL/L (ref 3.4–5.3)
PROT SERPL-MCNC: 7 G/DL (ref 6.4–8.3)
RBC # BLD AUTO: 4.21 10E6/UL (ref 3.8–5.2)
SODIUM SERPL-SCNC: 138 MMOL/L (ref 136–145)
TRIGL SERPL-MCNC: 174 MG/DL
TSH SERPL DL<=0.005 MIU/L-ACNC: 2.28 UIU/ML (ref 0.3–4.2)
WBC # BLD AUTO: 9.3 10E3/UL (ref 4–11)

## 2023-09-11 PROCEDURE — 80061 LIPID PANEL: CPT | Mod: ZL | Performed by: PHYSICIAN ASSISTANT

## 2023-09-11 PROCEDURE — 80053 COMPREHEN METABOLIC PANEL: CPT | Mod: ZL | Performed by: PHYSICIAN ASSISTANT

## 2023-09-11 PROCEDURE — 83036 HEMOGLOBIN GLYCOSYLATED A1C: CPT | Mod: ZL | Performed by: PHYSICIAN ASSISTANT

## 2023-09-11 PROCEDURE — 88141 CYTOPATH C/V INTERPRET: CPT | Performed by: PATHOLOGY

## 2023-09-11 PROCEDURE — G0463 HOSPITAL OUTPT CLINIC VISIT: HCPCS | Mod: 25

## 2023-09-11 PROCEDURE — 73030 X-RAY EXAM OF SHOULDER: CPT | Mod: LT

## 2023-09-11 PROCEDURE — 85025 COMPLETE CBC W/AUTO DIFF WBC: CPT | Mod: ZL | Performed by: PHYSICIAN ASSISTANT

## 2023-09-11 PROCEDURE — 99214 OFFICE O/P EST MOD 30 MIN: CPT | Mod: 25 | Performed by: PHYSICIAN ASSISTANT

## 2023-09-11 PROCEDURE — 83735 ASSAY OF MAGNESIUM: CPT | Mod: ZL | Performed by: PHYSICIAN ASSISTANT

## 2023-09-11 PROCEDURE — 99396 PREV VISIT EST AGE 40-64: CPT | Performed by: PHYSICIAN ASSISTANT

## 2023-09-11 PROCEDURE — G0123 SCREEN CERV/VAG THIN LAYER: HCPCS | Performed by: PHYSICIAN ASSISTANT

## 2023-09-11 PROCEDURE — 84443 ASSAY THYROID STIM HORMONE: CPT | Mod: ZL | Performed by: PHYSICIAN ASSISTANT

## 2023-09-11 PROCEDURE — 36415 COLL VENOUS BLD VENIPUNCTURE: CPT | Mod: ZL | Performed by: PHYSICIAN ASSISTANT

## 2023-09-11 PROCEDURE — 87624 HPV HI-RISK TYP POOLED RSLT: CPT | Mod: ZL | Performed by: PHYSICIAN ASSISTANT

## 2023-09-11 ASSESSMENT — ENCOUNTER SYMPTOMS
EYE PAIN: 0
DYSURIA: 0
COUGH: 1
JOINT SWELLING: 0
DIARRHEA: 0
PARESTHESIAS: 0
NAUSEA: 0
FEVER: 0
CONSTIPATION: 0
HEMATOCHEZIA: 0
SORE THROAT: 1
ARTHRALGIAS: 0
HEADACHES: 0
MYALGIAS: 0
WEAKNESS: 0
FREQUENCY: 0
ABDOMINAL PAIN: 0
NERVOUS/ANXIOUS: 0
PALPITATIONS: 0
CHILLS: 0
SHORTNESS OF BREATH: 0
DIZZINESS: 0
BREAST MASS: 0
HEMATURIA: 0
HEARTBURN: 0

## 2023-09-11 ASSESSMENT — PAIN SCALES - GENERAL: PAINLEVEL: NO PAIN (0)

## 2023-09-11 NOTE — NURSING NOTE
Patient presents to clinic for annual physical. Has had cold for 8 days, she states taht it is in her chest now. COVID negative  Brandi Najera, ALBINO ....................  9/11/2023   1:43 PM  EXT [id number]

## 2023-09-11 NOTE — PROGRESS NOTES
SUBJECTIVE:   CC: Tere is an 48 year old who presents for preventive health visit.       Healthy Habits:     Getting at least 3 servings of Calcium per day:  NO    Bi-annual eye exam:  Yes    Dental care twice a year:  NO    Sleep apnea or symptoms of sleep apnea:  None    Diet:  Regular (no restrictions)    Frequency of exercise:  1 day/week    Duration of exercise:  15-30 minutes    Taking medications regularly:  Yes    Medication side effects:  Not applicable    Additional concerns today:  Yes    Here for annual physical.  Concerns as below.    Patient does have an extensive history of abnormal Pap smears.  Reports she has undergone colposcopies, LEEP, other procedures in the past.  Most recent Pap smear was completed in 2021 showing ASCUS with other HPV positive.  She underwent colposcopy returning KARL-1.  She has not had follow-up as of yet.  She would like to consider possible hysterectomy if her Pap smear again returns abnormal today.    Has been struggling with some upper respiratory symptoms for the last week.  Initially started with fever, sore throat and has progressed to cough and some chest congestion.  Did test negative for COVID at home.  Managing with DayQuil and NyQuil.  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    Has also been noticing palpitations as well as faster heart rate especially when she is exercising.  She has been exercising on a bike more regularly and she has been noticing tachycardia with some dizziness.  Does notice at times palpitations at rest.  She reports a family history of MI, CHF, son with a history of cardiac arrhythmia.  No chest pain, syncope, headaches, vision changes, shortness of breath.    Has struggled chronically with left shoulder pain.  Reports several years ago there was an incident when she was replacing a light fixture above her head and she felt the shoulder pop.  Since then the shoulder will pop in and out on its own with associated pain  and limited overhead range of motion.  She had been following with a chiropractor who reports there is not much more they can do for her as her shoulder joint is too unstable.  She has not had imaging regarding this.    Contraception: Tubal  Risk for STI?: No  FH of early CA?: Father with skin cancer  Cholesterol/DM concerns/screening: Due  Tobacco?: 1 ppd  Calcium intake: Dietary  DEXA: NA  Last mammo: 10/2022  Colonoscopy: 02/2021  Immunizations: UTD        Social History     Tobacco Use    Smoking status: Every Day     Packs/day: 1.00     Years: 20.00     Pack years: 20.00     Types: Cigarettes    Smokeless tobacco: Never   Substance Use Topics    Alcohol use: Not Currently     Alcohol/week: 5.0 standard drinks of alcohol     Types: 5 Cans of beer per week     Comment:  weekly              9/11/2023     1:47 PM   Alcohol Use   Prescreen: >3 drinks/day or >7 drinks/week? No     Reviewed orders with patient.  Reviewed health maintenance and updated orders accordingly - Yes      Breast Cancer Screening:  Any new diagnosis of family breast, ovarian, or bowel cancer? No    FHS-7:       10/28/2022     3:34 PM   Breast CA Risk Assessment (FHS-7)   Did any of your first-degree relatives have breast or ovarian cancer? No   Did any of your relatives have bilateral breast cancer? No   Did any man in your family have breast cancer? No   Did any woman in your family have breast and ovarian cancer? No   Did any woman in your family have breast cancer before age 50 y? No   Do you have 2 or more relatives with breast and/or ovarian cancer? No   Do you have 2 or more relatives with breast and/or bowel cancer? No       Mammogram Screening: Recommended annual mammography  Pertinent mammograms are reviewed under the imaging tab.    History of abnormal Pap smear: Last 3 Pap and HPV Results:       Latest Ref Rng & Units 12/23/2020    10:04 AM 9/7/2016    11:00 AM 9/7/2016    12:00 AM   PAP / HPV   PAP (Historical)  ASC-US   NIL    HPV  16 DNA NEG^Negative Negative  Negative     HPV 18 DNA NEG^Negative Negative  Negative     Other HR HPV NEG^Negative Positive  Positive           Latest Ref Rng & Units 2020    10:04 AM 2016    11:00 AM 2016    12:00 AM   PAP / HPV   PAP (Historical)  ASC-US   NIL    HPV 16 DNA NEG^Negative Negative  Negative     HPV 18 DNA NEG^Negative Negative  Negative     Other HR HPV NEG^Negative Positive  Positive       Reviewed and updated as needed this visit by clinical staff   Tobacco  Allergies  Meds              Reviewed and updated as needed this visit by Provider                 No past medical history on file.   Past Surgical History:   Procedure Laterality Date    ABDOMEN SURGERY      hernia surgery X5    ARTHROSCOPY ANKLE, OPEN REPAIR LIGAMENT, COMBINED Right 2023    Procedure: ARTHROSCOPY, ANKLE, OPEN REPAIR OF ANKLE LIGAMENT, Osteochondral lesion of talus debridement;  Surgeon: Jose David Sibley DPM;  Location:  OR    BIOPSY BREAST NEEDLE LOCALIZATION Left 2017    Procedure: BIOPSY BREAST NEEDLE LOCALIZATION;  Surgeon: Sita Rushing MD;  Location: [facility]    BREAST SURGERY Left 2016    biopsy    BREAST SURGERY Left 2016    biopsy    COLONOSCOPY N/A 2021    Procedure: COLONOSCOPY, WITH POLYPECTOMY AND BIOPSY, and hemorrhoid banding;  Surgeon: Naeem Camarena MD;  Location:  OR    GYN SURGERY      tubal ligation    ORTHOPEDIC SURGERY Left 10/31/2016    left knee medial retinacular repair, VMO advancement, and reapir of partial tear of quadriceps tendon    ORTHOPEDIC SURGERY Left 2017    left knee manipulation    TUBAL LIGATION       OB History    Para Term  AB Living   6 4 3 1 2 4   SAB IAB Ectopic Multiple Live Births   2 0 0 0 4      # Outcome Date GA Lbr Massimo/2nd Weight Sex Delivery Anes PTL Lv   6 Term 07/10/04    M Vag-Spont   ROXIE   5 SAB            4 SAB            3 Term 01    F Vag-Spont   ROXIE   2  95    M  "Vag-Spont  Y ROXIE   1 Term 09/20/93    M Vag-Spont   [facility]       Review of Systems   Constitutional:  Negative for chills and fever.   HENT:  Positive for congestion and sore throat. Negative for ear pain and hearing loss.    Eyes:  Positive for visual disturbance. Negative for pain.   Respiratory:  Positive for cough. Negative for shortness of breath.    Cardiovascular:  Negative for chest pain, palpitations and peripheral edema.   Gastrointestinal:  Negative for abdominal pain, constipation, diarrhea, heartburn, hematochezia and nausea.   Breasts:  Negative for tenderness, breast mass and discharge.   Genitourinary:  Negative for dysuria, frequency, genital sores, hematuria, pelvic pain, urgency, vaginal bleeding and vaginal discharge.   Musculoskeletal:  Negative for arthralgias, joint swelling and myalgias.   Skin:  Negative for rash.   Neurological:  Negative for dizziness, weakness, headaches and paresthesias.   Psychiatric/Behavioral:  Positive for mood changes. The patient is not nervous/anxious.           OBJECTIVE:   /76   Pulse 63   Temp 97.6  F (36.4  C)   Resp 14   Ht 1.727 m (5' 8\")   Wt 88.8 kg (195 lb 12.8 oz)   LMP  (Exact Date)   SpO2 97%   BMI 29.77 kg/m    Physical Exam  GENERAL: healthy, alert and no distress  EYES: Eyes grossly normal to inspection, PERRL and conjunctivae and sclerae normal  HENT: ear canals and TM's normal, nose and mouth without ulcers or lesions  NECK: no adenopathy, no asymmetry, masses, or scars and thyroid normal to palpation  RESP: lungs clear to auscultation - no rales, rhonchi or wheezes  CV: regular rate and rhythm, normal S1 S2, no S3 or S4, no murmur, click or rub, no peripheral edema and peripheral pulses strong   (female): normal female external genitalia, normal urethral meatus, vaginal mucosa pink, moist, well rugated, and normal cervix/adnexa/uterus without masses or discharge  MS: no gross musculoskeletal defects noted, no edema  SKIN: no " suspicious lesions or rashes  NEURO: Normal strength and tone, mentation intact and speech normal  PSYCH: mentation appears normal, affect normal/bright    Diagnostic Test Results:  Labs reviewed in Epic    ASSESSMENT/PLAN:       ICD-10-CM    1. Routine history and physical examination of adult  Z00.00 CBC and Differential     Comprehensive Metabolic Panel     CBC and Differential     Comprehensive Metabolic Panel      2. Cervical cancer screening  Z12.4 Pap Screen Thin Prep      3. Encounter for screening mammogram for breast cancer  Z12.31 MA Screen Bilateral w/Mamadou      4. Lipid screening  Z13.220 Lipid Panel     Lipid Panel      5. Screening for diabetes mellitus  Z13.1 Hemoglobin A1c     Hemoglobin A1c      6. Palpitations  R00.2 CBC and Differential     Comprehensive Metabolic Panel     TSH Reflex GH     Magnesium     CBC and Differential     Comprehensive Metabolic Panel     TSH Reflex GH     Magnesium      7. Chronic left shoulder pain  M25.512 XR Shoulder Left G/E 3 Views    G89.29       8. Viral URI  J06.9         Pap smear updated, mammogram ordered for October.  Up-to-date on colonoscopy.  Lab work pending as above.  Up-to-date on immunizations.  Encouraged healthy lifestyle.    2. Pap smear updated. If abnormal, patient would like to consult with OB/GYN to discuss hysterectomy    3. Mammogram ordered    4. Lipid panel pending.     5. A1c pending.    6. Differential includes cardiac arrhythmia, vitamin, iron, electrolyte abnormality, thyroid disorder, infection, other underlying cardiac cause, related to mental health, normal physiologic response with exercise, etc.  Vitals and exam stable.  Lab work pending.  Offered additional evaluation with EKG and Zio patch with patient deferred until lab work results are obtained.    7.  Differential includes rotator cuff injury, labral injury, underlying degenerative changes, etc.   exam concerning for possible rotator cuff versus labral injury.  X-ray updated  "showing mild degenerative AC joint changes. Awaiting final radiology read. Consider PT vs ortho referral due to chronic instability.     8. Symptoms and exam consistent with viral URI. Continue with symptomatic management.         Patient has been advised of split billing requirements and indicates understanding: Yes      COUNSELING:  Reviewed preventive health counseling, as reflected in patient instructions      BMI:   Estimated body mass index is 29.77 kg/m  as calculated from the following:    Height as of this encounter: 1.727 m (5' 8\").    Weight as of this encounter: 88.8 kg (195 lb 12.8 oz).   Weight management plan: Discussed healthy diet and exercise guidelines      She reports that she has been smoking cigarettes. She has a 20.00 pack-year smoking history. She has never used smokeless tobacco.  Nicotine/Tobacco Cessation Plan:   Information offered: Patient not interested at this time          Omayra Sousa PA-C  [facility] AND [facility]  "

## 2023-09-13 LAB
BKR LAB AP GYN ADEQUACY: ABNORMAL
BKR LAB AP GYN INTERPRETATION: ABNORMAL
BKR LAB AP HPV REFLEX: ABNORMAL
BKR LAB AP PREVIOUS ABNORMAL: ABNORMAL
HUMAN PAPILLOMA VIRUS 16 DNA: NEGATIVE
HUMAN PAPILLOMA VIRUS 18 DNA: NEGATIVE
HUMAN PAPILLOMA VIRUS FINAL DIAGNOSIS: ABNORMAL
HUMAN PAPILLOMA VIRUS OTHER HR: POSITIVE
PATH REPORT.COMMENTS IMP SPEC: ABNORMAL
PATH REPORT.COMMENTS IMP SPEC: ABNORMAL
PATH REPORT.RELEVANT HX SPEC: ABNORMAL

## 2023-09-18 ENCOUNTER — MYC MEDICAL ADVICE (OUTPATIENT)
Dept: FAMILY MEDICINE | Facility: OTHER | Age: 48
End: 2023-09-18
Payer: COMMERCIAL

## 2023-09-18 DIAGNOSIS — R87.618 PAP SMEAR ABNORMALITY OF CERVIX/HUMAN PAPILLOMAVIRUS (HPV) POSITIVE: ICD-10-CM

## 2023-09-18 DIAGNOSIS — R87.610 ATYPICAL SQUAMOUS CELLS OF UNDETERMINED SIGNIFICANCE ON CYTOLOGIC SMEAR OF CERVIX (ASC-US): Primary | ICD-10-CM

## 2023-10-11 ENCOUNTER — OFFICE VISIT (OUTPATIENT)
Dept: OBGYN | Facility: OTHER | Age: 48
End: 2023-10-11
Attending: PHYSICIAN ASSISTANT
Payer: COMMERCIAL

## 2023-10-11 VITALS
HEART RATE: 72 BPM | DIASTOLIC BLOOD PRESSURE: 80 MMHG | SYSTOLIC BLOOD PRESSURE: 142 MMHG | BODY MASS INDEX: 28.9 KG/M2 | WEIGHT: 190.1 LBS

## 2023-10-11 DIAGNOSIS — R87.610 ATYPICAL SQUAMOUS CELLS OF UNDETERMINED SIGNIFICANCE ON CYTOLOGIC SMEAR OF CERVIX (ASC-US): ICD-10-CM

## 2023-10-11 DIAGNOSIS — N87.0 MILD DYSPLASIA OF CERVIX (CIN I): Primary | ICD-10-CM

## 2023-10-11 DIAGNOSIS — R87.618 PAP SMEAR ABNORMALITY OF CERVIX/HUMAN PAPILLOMAVIRUS (HPV) POSITIVE: ICD-10-CM

## 2023-10-11 PROCEDURE — 88305 TISSUE EXAM BY PATHOLOGIST: CPT

## 2023-10-11 PROCEDURE — 56821 COLPOSCOPY VULVA W/BIOPSY: CPT | Performed by: STUDENT IN AN ORGANIZED HEALTH CARE EDUCATION/TRAINING PROGRAM

## 2023-10-11 NOTE — PROGRESS NOTES
COLPOSCOPY PROCEDURE NOTE    Ms. Camarena is a 48 year old  female presents to clinic today for a colposcopy.  She had a pap smear on September 2023 which showed ASCUS, HPV HR non 16, 18 positive.     Her pap history is significant for the following:    Sept 2016: NIL, HPV other HR+  Dec 2020: ASCUS HPV other HR+  February 2021 Colposcopy KARL-1  September 2023: ASCUS, HPV other HR+    Prior to this she has had a LEEP procedure in the early 2000s for KARL-3    Prior tubal ligation procedure for contraception.    Procedure:  Pre-operative diagnosis: ASCUS with persistent HPV positive  Post-operative diagnosis: Same  EBL: 5 mL  Anesthesia: none    We discussed the colposcopy procedure in detail and I described the risks/benefits as well as the risks/benefits of acquiring samples for pathology including but not limited to bleeding, cramping and infection. She was in agreement with the plan and signed the consents. Just before the procedure began, we went through a surgical time-out to confirm the patient, procedure to be performed and any allergies.    She was assisted into a dorsal lithotomy position. Examination of the perineum, vulva and vagina all appeared normal. A speculum was gently placed in vagina and adjusted to allow for excellent visualization of cervix. Immediate gross assessment revealed a normal appearing cervix without any lesions, erosions, nodules, masses or extra vascularity. The cervix and upper vagina was then swabbed with acetic acid solution for the next step in evaluation. The colposcope was then adjusted to allow for closer visualization.  No acetowhite changes were noted. green light was used to visualize any abnormal vasculature, this showed no abnormal vessels. Lugol's solution was then applied to the cervix and it was inspected again. After Lugol's application, again no color changes were noted. The transformation zone was clearly visualized for an adequate colposcopy. The decision was made to  take a biopsy at the 8 and 12 o'clock positions for random biopsy and to instigate an immune response. Biopsies were taken without difficulty and sent to pathology for review. Monsel's was used to provide hemostasis from the biopsy sites Ms. Camarena tolerated the procedure well.      IMPRESSION & ASSESSMENT:   -Satisfactory colposcopy with squamocolumnar junction clearly visualized in entirety   -Lesions identified:  no. Random biopsies performed  -Vessels: No abnormal vessels noted   -ECC performed?: yes    PLAN:   -Specimens sent to pathology  -Will base further treatment on pathology findings.  -Post biopsy instructions given to patient  -Will call to discuss Pathology results when they are available    Her most recent period was 4-5 months ago. She is interested in discussing hysterectomy for definitive management of this.  She notes she is frustrated at the repetitive cycle of this.  In further discussion with her history of KARL-3 and LEEP procedure we discussed that a Pap smear would still be required even if the cervix was gone.  We did discuss that vaginal biopsies could be performed if needed.  We discussed that HPV can be present on the vaginal tissue however is less likely than on the cervix.  We reviewed the different types of hysterectomy and noted that a minimally invasive approach would be desired.  As she has had prior vaginal deliveries we discussed that a vaginal hysterectomy would be the desired approach.  And exam seems amenable to that with a mobile approximately 6 cm uterus.  In discussing her surgical history she describes multiple abdominal hernias and umbilical hernia repair procedures up to 5 surgeries.  She notes there is mesh present at the umbilicus.  She also describes a ruptured appendicitis and has a large scar from this in the lower abdomen.  In reviewing her surgical history we discussed the risks and benefits of procedure and that there would be slightly more risk with the prior  surgical history and scar formation.  In this context at this time we decided that the benefits do not outweigh the risks as Pap smears would still be warranted.  Discussed that we would continue to do frequent surveillance and if any changes along the cell types occurred would proceed with hysterectomy at that time.    If surgery was desired the exam feels amenable to a vaginal approach.  If laparoscopic approach were needed there is mesh at the level of the umbilicus.  Anticipate a Co. surgery with general surgery if laparoscopic approach were needed and umbilical hernia mesh is present.    Latha Milian MD  OB/GYN   10/11/2023 10:57 AM

## 2023-10-13 LAB
PATH REPORT.COMMENTS IMP SPEC: NORMAL
PATH REPORT.FINAL DX SPEC: NORMAL
PHOTO IMAGE: NORMAL

## 2023-11-01 ENCOUNTER — HOSPITAL ENCOUNTER (OUTPATIENT)
Dept: MAMMOGRAPHY | Facility: OTHER | Age: 48
Discharge: HOME | End: 2023-11-01
Attending: PHYSICIAN ASSISTANT | Admitting: PHYSICIAN ASSISTANT
Payer: COMMERCIAL

## 2023-11-01 DIAGNOSIS — Z12.31 ENCOUNTER FOR SCREENING MAMMOGRAM FOR BREAST CANCER: ICD-10-CM

## 2023-11-01 PROCEDURE — 77067 SCR MAMMO BI INCL CAD: CPT

## 2023-11-09 ENCOUNTER — OFFICE VISIT (OUTPATIENT)
Dept: FAMILY MEDICINE | Facility: OTHER | Age: 48
End: 2023-11-09
Attending: PHYSICIAN ASSISTANT
Payer: COMMERCIAL

## 2023-11-09 VITALS
DIASTOLIC BLOOD PRESSURE: 70 MMHG | WEIGHT: 191 LBS | OXYGEN SATURATION: 98 % | HEART RATE: 68 BPM | SYSTOLIC BLOOD PRESSURE: 128 MMHG | BODY MASS INDEX: 28.95 KG/M2 | RESPIRATION RATE: 14 BRPM | TEMPERATURE: 97.2 F | HEIGHT: 68 IN

## 2023-11-09 DIAGNOSIS — G43.019 INTRACTABLE MIGRAINE WITHOUT AURA AND WITHOUT STATUS MIGRAINOSUS: Primary | ICD-10-CM

## 2023-11-09 PROCEDURE — G0463 HOSPITAL OUTPT CLINIC VISIT: HCPCS

## 2023-11-09 PROCEDURE — 250N000009 HC RX 250: Performed by: PHYSICIAN ASSISTANT

## 2023-11-09 PROCEDURE — 96372 THER/PROPH/DIAG INJ SC/IM: CPT | Performed by: PHYSICIAN ASSISTANT

## 2023-11-09 PROCEDURE — 99214 OFFICE O/P EST MOD 30 MIN: CPT | Performed by: PHYSICIAN ASSISTANT

## 2023-11-09 PROCEDURE — 250N000011 HC RX IP 250 OP 636: Mod: JZ | Performed by: PHYSICIAN ASSISTANT

## 2023-11-09 PROCEDURE — 250N000013 HC RX MED GY IP 250 OP 250 PS 637: Performed by: PHYSICIAN ASSISTANT

## 2023-11-09 PROCEDURE — G0463 HOSPITAL OUTPT CLINIC VISIT: HCPCS | Mod: 25

## 2023-11-09 RX ORDER — DEXAMETHASONE SODIUM PHOSPHATE 4 MG/ML
10 VIAL (ML) INJECTION ONCE
Status: COMPLETED | OUTPATIENT
Start: 2023-11-09 | End: 2023-11-09

## 2023-11-09 RX ORDER — DIPHENHYDRAMINE HCL 25 MG
25 CAPSULE ORAL ONCE
Status: COMPLETED | OUTPATIENT
Start: 2023-11-09 | End: 2023-11-09

## 2023-11-09 RX ORDER — DIPHENHYDRAMINE HCL 25 MG
25 CAPSULE ORAL EVERY 6 HOURS PRN
Status: DISCONTINUED | OUTPATIENT
Start: 2023-11-09 | End: 2023-11-09

## 2023-11-09 RX ORDER — KETOROLAC TROMETHAMINE 30 MG/ML
60 INJECTION, SOLUTION INTRAMUSCULAR; INTRAVENOUS ONCE
Status: COMPLETED | OUTPATIENT
Start: 2023-11-09 | End: 2023-11-09

## 2023-11-09 RX ADMIN — KETOROLAC TROMETHAMINE 60 MG: 30 INJECTION, SOLUTION INTRAMUSCULAR at 14:19

## 2023-11-09 RX ADMIN — DEXAMETHASONE SODIUM PHOSPHATE 10 MG: 4 INJECTION, SOLUTION INTRAMUSCULAR; INTRAVENOUS at 14:18

## 2023-11-09 RX ADMIN — DIPHENHYDRAMINE HYDROCHLORIDE 25 MG: 25 CAPSULE ORAL at 14:18

## 2023-11-09 ASSESSMENT — PAIN SCALES - GENERAL: PAINLEVEL: SEVERE PAIN (6)

## 2023-11-09 NOTE — NURSING NOTE
Patient presents to clinic with migraine that she has had for the past 2 weeks.  Brandi Najera LPN ....................  11/9/2023   1:54 PM  EXT [id number]

## 2023-11-09 NOTE — PROGRESS NOTES
"  Assessment & Plan     1. Intractable migraine without aura and without status migrainosus  Chronic with current flare.  Migraine cocktail given as outlined below.  Minimal improvement with topiramate for prophylaxis and significant GI side effects.  We will transition to amitriptyline as outlined below.  May need to increase dose.  Okay to continue using Imitrex as needed.  Okay to continue with chiropractor, consider acupuncture.  If minimal improvement consider neurology referral due to refractory symptoms despite as needed and prophylactic management.  - ketorolac (TORADOL) injection 60 mg  - dexAMETHasone (DECADRON) injectable solution used ORALLY 10 mg  - amitriptyline (ELAVIL) 25 MG tablet; Take 1 tablet (25 mg) by mouth at bedtime  Dispense: 90 tablet; Refill: 0  - diphenhydrAMINE (BENADRYL) capsule 25 mg      No follow-ups on file.    Omayra Sousa PA-C  [facility] AND [facility]    Oliver Carranza is a 48 year old, presenting for the following health issues:  Migraine      History of Present Illness       Headaches:   Since the patient's last clinic visit, headaches are: worsened  The patient is getting headaches:  Everyday for 2 weeks  She is not able to do normal daily activities when she has a migraine.  The patient is taking the following rescue/relief medications:  Ibuprofen (Advil, Motrin), Tylenol and sumatriptan (Imitrex)   Patient states \"The relief is inconsistent\" from the rescue/relief medications.   The patient is taking the following medications to prevent migraines:  No medications to prevent migraines  In the past 4 weeks, the patient has gone to an Urgent Care or Emergency Room 0 times times due to headaches.    She eats 2-3 servings of fruits and vegetables daily.She consumes 5 sweetened beverage(s) daily.She exercises with enough effort to increase her heart rate 20 to 29 minutes per day.  She exercises with enough effort to increase her heart rate 5 days per week.   She " is taking medications regularly.     Here for follow-up on chronic migraines with current flare.  Patient has continued on Imitrex as needed for migraines for quite some time.  She was started on topiramate 25 mg for prophylaxis but did not notice much improvement in the cough significant GI side effects.  She is since discontinued.  She has been struggle with approximate 2 migraines per month but more recently she has had a migrainous headache every day for 2 weeks.  Associated nausea, vomiting, photophobia, phonophobia.  Has tried Imitrex without relief.  Met with chiropractor who completed acupressure techniques without improvement.  Has not previously met with neurology.      PAST MEDICAL HISTORY: No past medical history on file.    PAST SURGICAL HISTORY:   Past Surgical History:   Procedure Laterality Date    ABDOMEN SURGERY      hernia surgery X5    ARTHROSCOPY ANKLE, OPEN REPAIR LIGAMENT, COMBINED Right 2/16/2023    Procedure: ARTHROSCOPY, ANKLE, OPEN REPAIR OF ANKLE LIGAMENT, Osteochondral lesion of talus debridement;  Surgeon: Jose David Sibley DPM;  Location:  OR    BIOPSY BREAST NEEDLE LOCALIZATION Left 01/30/2017    Procedure: BIOPSY BREAST NEEDLE LOCALIZATION;  Surgeon: Sita Rushing MD;  Location: [facility] OR    BREAST SURGERY Left 09/28/2016    biopsy    BREAST SURGERY Left 09/13/2016    biopsy    COLONOSCOPY N/A 02/18/2021    Procedure: COLONOSCOPY, WITH POLYPECTOMY AND BIOPSY, and hemorrhoid banding;  Surgeon: Naeem Camarena MD;  Location:  OR    GYN SURGERY  2004    tubal ligation    ORTHOPEDIC SURGERY Left 10/31/2016    left knee medial retinacular repair, VMO advancement, and reapir of partial tear of quadriceps tendon    ORTHOPEDIC SURGERY Left 04/18/2017    left knee manipulation    TUBAL LIGATION         FAMILY HISTORY:   Family History   Problem Relation Age of Onset    Other - See Comments Mother         back surgery    Other Cancer Father         skin cancer    Hypertension Father      "Gastrointestinal Disease Father         Diverticulosis       SOCIAL HISTORY:   Social History     Tobacco Use    Smoking status: Every Day     Packs/day: 1.00     Years: 20.00     Additional pack years: 0.00     Total pack years: 20.00     Types: Cigarettes    Smokeless tobacco: Never   Substance Use Topics    Alcohol use: Not Currently     Alcohol/week: 5.0 standard drinks of alcohol     Types: 5 Cans of beer per week     Comment:  weekly         Allergies   Allergen Reactions    Peanuts [Peanut-Containing Drug Products]      Eyes swell    Trees Other (See Comments)     Tree sap makes her eyes swell shut     Current Outpatient Medications   Medication    amitriptyline (ELAVIL) 25 MG tablet    SUMAtriptan (IMITREX) 50 MG tablet    topiramate (TOPAMAX) 25 MG tablet     Current Facility-Administered Medications   Medication    dexAMETHasone (DECADRON) injectable solution used ORALLY 10 mg    diphenhydrAMINE (BENADRYL) capsule 25 mg    ketorolac (TORADOL) injection 60 mg         Review of Systems   Per HPI        Objective    /70   Pulse 68   Temp 97.2  F (36.2  C)   Resp 14   Ht 1.727 m (5' 8\")   Wt 86.6 kg (191 lb)   LMP  (Exact Date)   SpO2 98%   BMI 29.04 kg/m    Body mass index is 29.04 kg/m .  Physical Exam   General: Pleasant, in no apparent distress.  Eyes: Sclera are white and conjunctiva are clear bilaterally. Lacrimal apparatus free of erythema, edema, and discharge bilaterally.  Ears: External ears without erythema or edema.   Nose: External nose is symmetrical and free of lesions and deformities.   Neurologic Exam: Non-focal, symmetric DTRs, normal gross motor, tone coordination and no visible tremor.  Skin: No jaundice, pallor, rashes, or lesions on exposed skin.   Psych: Appropriate mood and affect.      "

## 2024-04-03 DIAGNOSIS — G43.809 OTHER MIGRAINE WITHOUT STATUS MIGRAINOSUS, NOT INTRACTABLE: ICD-10-CM

## 2024-04-08 RX ORDER — SUMATRIPTAN 50 MG/1
50 TABLET, FILM COATED ORAL
Qty: 30 TABLET | Refills: 3 | Status: SHIPPED | OUTPATIENT
Start: 2024-04-08 | End: 2024-08-27 | Stop reason: ALTCHOICE

## 2024-04-08 NOTE — TELEPHONE ENCOUNTER
[facility] sent Rx request for the following:      Requested Prescriptions   Pending Prescriptions Disp Refills    SUMAtriptan (IMITREX) 50 MG tablet [Pharmacy Med Name: sumatriptan 50 mg tablet] 30 tablet 3     Sig: Take 1 tablet (50 mg) by mouth at onset of headache for migraine May repeat in 2 hours. Max 4 tablets/24 hours.       Serotonin Agonists Failed - 4/8/2024 11:26 AM        Failed - Serotonin Agonist request needs review.     Please review patient's record. If patient has had 8 or more treatments in the past month, please forward to provider.         Last Prescription Date:   10/26/22  Last Fill Qty/Refills:         30, R-3    Last Office Visit:              11/9/23   Future Office visit:           none    Routing refill request to provider for review/approval because:  Drug not on the G refill protocol     Bette Arredondo RN on 4/8/2024 at 11:27 AM

## 2024-05-06 ENCOUNTER — MYC MEDICAL ADVICE (OUTPATIENT)
Dept: FAMILY MEDICINE | Facility: OTHER | Age: 49
End: 2024-05-06

## 2024-05-06 ENCOUNTER — OFFICE VISIT (OUTPATIENT)
Dept: FAMILY MEDICINE | Facility: OTHER | Age: 49
End: 2024-05-06
Attending: NURSE PRACTITIONER

## 2024-05-06 VITALS
WEIGHT: 205.6 LBS | HEIGHT: 68 IN | HEART RATE: 69 BPM | SYSTOLIC BLOOD PRESSURE: 138 MMHG | TEMPERATURE: 97.7 F | OXYGEN SATURATION: 96 % | BODY MASS INDEX: 31.16 KG/M2 | DIASTOLIC BLOOD PRESSURE: 86 MMHG | RESPIRATION RATE: 16 BRPM

## 2024-05-06 DIAGNOSIS — H10.13 ALLERGIC CONJUNCTIVITIS, BILATERAL: Primary | ICD-10-CM

## 2024-05-06 PROCEDURE — 99213 OFFICE O/P EST LOW 20 MIN: CPT

## 2024-05-06 RX ORDER — CROMOLYN SODIUM 40 MG/ML
1 SOLUTION/ DROPS OPHTHALMIC 4 TIMES DAILY
Qty: 10 ML | Refills: 0 | Status: SHIPPED | OUTPATIENT
Start: 2024-05-06

## 2024-05-06 ASSESSMENT — ANXIETY QUESTIONNAIRES
6. BECOMING EASILY ANNOYED OR IRRITABLE: NOT AT ALL
GAD7 TOTAL SCORE: 5
8. IF YOU CHECKED OFF ANY PROBLEMS, HOW DIFFICULT HAVE THESE MADE IT FOR YOU TO DO YOUR WORK, TAKE CARE OF THINGS AT HOME, OR GET ALONG WITH OTHER PEOPLE?: SOMEWHAT DIFFICULT
4. TROUBLE RELAXING: SEVERAL DAYS
5. BEING SO RESTLESS THAT IT IS HARD TO SIT STILL: SEVERAL DAYS
3. WORRYING TOO MUCH ABOUT DIFFERENT THINGS: SEVERAL DAYS
2. NOT BEING ABLE TO STOP OR CONTROL WORRYING: SEVERAL DAYS
1. FEELING NERVOUS, ANXIOUS, OR ON EDGE: SEVERAL DAYS
IF YOU CHECKED OFF ANY PROBLEMS ON THIS QUESTIONNAIRE, HOW DIFFICULT HAVE THESE PROBLEMS MADE IT FOR YOU TO DO YOUR WORK, TAKE CARE OF THINGS AT HOME, OR GET ALONG WITH OTHER PEOPLE: SOMEWHAT DIFFICULT
GAD7 TOTAL SCORE: 5
7. FEELING AFRAID AS IF SOMETHING AWFUL MIGHT HAPPEN: NOT AT ALL
7. FEELING AFRAID AS IF SOMETHING AWFUL MIGHT HAPPEN: NOT AT ALL

## 2024-05-06 ASSESSMENT — PAIN SCALES - GENERAL: PAINLEVEL: MODERATE PAIN (4)

## 2024-05-06 NOTE — NURSING NOTE
"Pt presents to  for possible pink eye in both eyes. Pt states both eyes started to feel itchy and drain yesterday.    Chief Complaint   Patient presents with    Conjunctivitis       FOOD SECURITY SCREENING QUESTIONS  Hunger Vital Signs:  Within the past 12 months we worried whether our food would run out before we got money to buy more. Never  Within the past 12 months the food we bought just didn't last and we didn't have money to get more. Never  Karina Dearmon 5/6/2024 3:26 PM      Initial /86 (BP Location: Left arm, Patient Position: Sitting, Cuff Size: Adult Regular)   Pulse 69   Temp 97.7  F (36.5  C) (Tympanic)   Resp 16   Ht 1.727 m (5' 8\")   Wt 93.3 kg (205 lb 9.6 oz)   SpO2 96%   BMI 31.26 kg/m   Estimated body mass index is 31.26 kg/m  as calculated from the following:    Height as of this encounter: 1.727 m (5' 8\").    Weight as of this encounter: 93.3 kg (205 lb 9.6 oz).  Medication Reconciliation: complete    Karina Dearmon  "

## 2024-05-06 NOTE — PATIENT INSTRUCTIONS
Please refer to your AVS for follow up and pain/symptoms management recommendations (I.e.: medications, helpful conservative treatment modalities, appropriate follow up if need to a specialist or family practice, etc.). Please return to urgent care if your symptoms change or worsen.     Discharge instructions:  -If you were prescribed a medication(s), please take this as prescribed/directed  -Monitor your symptoms, if changing/worsening, return to UC/ER or PCP for follow up     Viral Conjunctivitis   -Avoid touching the eye, as conjunctivitis/pink eye, is very contagious.   -Wash your hands regularly before touching your eye, if you must touch it. Wash your pillow case daily or every other day until symptoms clear up.   -Do not share cosmetics, eye drops or contacts with other individuals.   -Warm compresses are recommended as needed.   -For pain control (if needed), if you are able to take Ibuprofen and Tylenol, we recommend alternating these (see note below). Do not wear a patch over your eye (unless directed to do so).    -Alternate every 4 hours as needed. I.e.: Ibuprofen at 8am, Tylenol 12pm, Ibuprofen 4pm    -Daily maximum of Tylenol is 4000mg (recommend staying under 3000mg)   -Daily maximum of Ibuprofen is 1200mg (take no more than six 200mg pills a day)     Bacterial Conjunctivitis  -If placed antibiotic - please use as directed (wash hands before putting in eye).   -Avoid touching the eye, as conjunctivitis/pink eye, is very contagious.   -Wash your hands regularly before touching your eye, if you must touch it. Wash your pillow case daily or every other day until symptoms clear up.   -Do not share cosmetics, eye drops or contacts with other individuals.   -Warm compresses are recommended as needed.   -For pain control (if needed), if you are able to take Ibuprofen and Tylenol, we recommend alternating these (see note below). Do not wear a patch over your eye (unless directed to do so).    -Alternate every  4 hours as needed. I.e.: Ibuprofen at 8am, Tylenol 12pm, Ibuprofen 4pm    -Daily maximum of Tylenol is 4000mg (recommend staying under 3000mg)   -Daily maximum of Ibuprofen is 1200mg (take no more than six 200mg pills a day)     Allergic Conjunctivitis   -Allergic conjunctivitis typically improves once your allergies are treated/under control - an oral or topical (drop) antihistamine can aid with this (of note: if you are an individual that already has dry eyes, oral antihistamines can make this worse).   -Avoid touching the eye, as conjunctivitis/pink eye, is very contagious.   -Wash your hands regularly before touching your eye, if you must touch it. Wash your pillow case daily or every other day until symptoms clear up.   -Do not share cosmetics, eye drops or contacts with other individuals.   -Warm compresses are recommended as needed.   -For pain control (if needed), if you are able to take Ibuprofen and Tylenol, we recommend alternating these (see note below). Do not wear a patch over your eye (unless directed to do so).    -Alternate every 4 hours as needed. I.e.: Ibuprofen at 8am, Tylenol 12pm, Ibuprofen 4pm    -Daily maximum of Tylenol is 4000mg (recommend staying under 3000mg)   -Daily maximum of Ibuprofen is 1200mg (take no more than six 200mg pills a day)

## 2024-05-06 NOTE — PROGRESS NOTES
ASSESSMENT/PLAN:    I have reviewed the nursing notes.  I have reviewed the findings, diagnosis, plan and need for follow up with the patient.    1. Allergic conjunctivitis, bilateral  - cromolyn (OPTICROM) 4 % ophthalmic solution; Place 1 drop into both eyes 4 times daily  Dispense: 10 mL; Refill: 0    Patient presents with eye drainage and itchiness.  Physical exam and symptoms consistent with bilateral allergic conjunctivitis.  Provided patient with a prescription for cromolyn ophthalmic solution to use until her symptoms resolve.  Advised patient that she can use cool compresses and can also try an over-the-counter antihistamine such as Claritin or Zyrtec.  Advised patient to avoid allergens.  Discussed with patient that if her eye drainage worsens or becomes purulent to reach out as she may need an antibiotic eyedrop at that point.  But discussed that her symptoms are not consistent with bacterial infection at this time.    Discussed warning signs/symptoms indicative of need to f/u    Follow up if symptoms persist or worsen or concerns    I explained my diagnostic considerations and recommendations to the patient, who voiced understanding and agreement with the treatment plan. All questions were answered. We discussed potential side effects of any prescribed or recommended therapies, as well as expectations for response to treatments.    WANDA Stephen CNP  5/6/2024  3:28 PM    HPI:    Tere Camarena is a 49 year old female  who [facility] for concerns of eye symptoms    bilateral eye complaint/concern.     Eye Sx: discharge/drainage, burning  History: symptoms started yesterday     Contact or glasses use: YES - glasses  Changes in vision: No  Change in eye ROM: No  Presence of Flashers/Floaters: No  Discharge description: watery  Presence of URI Symptoms: No  Eyelid (any symptoms): No  Any dental or jaw pain: No  Any exposure to trauma or chemical burns to eye: No    Treatments  Tried: OTC Drops    Prior eye or sinus surgeries: No  Similar symptoms in the past: YES    PCP: SURESH Cole    History reviewed. No pertinent past medical history.  Past Surgical History:   Procedure Laterality Date    ABDOMEN SURGERY      hernia surgery X5    ARTHROSCOPY ANKLE, OPEN REPAIR LIGAMENT, COMBINED Right 2/16/2023    Procedure: ARTHROSCOPY, ANKLE, OPEN REPAIR OF ANKLE LIGAMENT, Osteochondral lesion of talus debridement;  Surgeon: oJse David Sibley DPM;  Location: [facility] OR    BIOPSY BREAST NEEDLE LOCALIZATION Left 01/30/2017    Procedure: BIOPSY BREAST NEEDLE LOCALIZATION;  Surgeon: Sita Rushing MD;  Location: [facility] OR    BREAST SURGERY Left 09/28/2016    biopsy    BREAST SURGERY Left 09/13/2016    biopsy    COLONOSCOPY N/A 02/18/2021    Procedure: COLONOSCOPY, WITH POLYPECTOMY AND BIOPSY, and hemorrhoid banding;  Surgeon: Naeem Camarena MD;  Location:  OR    GYN SURGERY  2004    tubal ligation    ORTHOPEDIC SURGERY Left 10/31/2016    left knee medial retinacular repair, VMO advancement, and reapir of partial tear of quadriceps tendon    ORTHOPEDIC SURGERY Left 04/18/2017    left knee manipulation    TUBAL LIGATION       Social History     Tobacco Use    Smoking status: Every Day     Current packs/day: 1.00     Average packs/day: 1 pack/day for 20.0 years (20.0 ttl pk-yrs)     Types: Cigarettes    Smokeless tobacco: Never   Substance Use Topics    Alcohol use: Yes     Alcohol/week: 5.0 standard drinks of alcohol     Types: 5 Cans of beer per week     Comment: sometimes     Current Outpatient Medications   Medication Sig Dispense Refill    SUMAtriptan (IMITREX) 50 MG tablet Take 1 tablet (50 mg) by mouth at onset of headache for migraine May repeat in 2 hours. Max 4 tablets/24 hours. 30 tablet 3    amitriptyline (ELAVIL) 25 MG tablet Take 1 tablet (25 mg) by mouth at bedtime (Patient not taking: Reported on 5/6/2024) 90 tablet 0     Allergies   Allergen Reactions    Peanuts [Peanut-Containing Drug  "Products]      Eyes swell    Trees Other (See Comments)     Tree sap makes her eyes swell shut     Past medical history, past surgical history, current medications and allergies reviewed and accurate to the best of my knowledge.      ROS:  Refer to HPI    /86 (BP Location: Left arm, Patient Position: Sitting, Cuff Size: Adult Regular)   Pulse 69   Temp 97.7  F (36.5  C) (Tympanic)   Resp 16   Ht 1.727 m (5' 8\")   Wt 93.3 kg (205 lb 9.6 oz)   SpO2 96%   BMI 31.26 kg/m      EXAM:  General Appearance: Well appearing 49 year old female, appropriate appearance for age. No acute distress   Eyes: conjunctivae with mild erythema and irritation, corneas clear, no drainage or crusting, no eyelid swelling, pupils equal   Dermatological: no rashes noted of exposed skin  Neuro: Alert and oriented to person, place, and time.    Psychological: normal affect, alert, oriented, and pleasant.       "

## 2024-07-18 ENCOUNTER — OFFICE VISIT (OUTPATIENT)
Dept: INTERNAL MEDICINE | Facility: OTHER | Age: 49
End: 2024-07-18

## 2024-07-18 VITALS
BODY MASS INDEX: 31.16 KG/M2 | HEART RATE: 62 BPM | WEIGHT: 205.6 LBS | DIASTOLIC BLOOD PRESSURE: 82 MMHG | OXYGEN SATURATION: 98 % | RESPIRATION RATE: 18 BRPM | SYSTOLIC BLOOD PRESSURE: 134 MMHG | HEIGHT: 68 IN | TEMPERATURE: 97.1 F

## 2024-07-18 DIAGNOSIS — G43.E01 CHRONIC MIGRAINE WITH AURA AND WITH STATUS MIGRAINOSUS, NOT INTRACTABLE: Primary | ICD-10-CM

## 2024-07-18 PROCEDURE — G2211 COMPLEX E/M VISIT ADD ON: HCPCS

## 2024-07-18 PROCEDURE — 250N000013 HC RX MED GY IP 250 OP 250 PS 637

## 2024-07-18 PROCEDURE — 96372 THER/PROPH/DIAG INJ SC/IM: CPT

## 2024-07-18 PROCEDURE — 250N000011 HC RX IP 250 OP 636: Mod: JZ

## 2024-07-18 PROCEDURE — 250N000009 HC RX 250

## 2024-07-18 PROCEDURE — 99213 OFFICE O/P EST LOW 20 MIN: CPT

## 2024-07-18 RX ORDER — DIPHENHYDRAMINE HCL 25 MG
25 CAPSULE ORAL EVERY 6 HOURS PRN
Status: ACTIVE | OUTPATIENT
Start: 2024-07-18

## 2024-07-18 RX ORDER — KETOROLAC TROMETHAMINE 30 MG/ML
60 INJECTION, SOLUTION INTRAMUSCULAR; INTRAVENOUS ONCE
Status: COMPLETED | OUTPATIENT
Start: 2024-07-18 | End: 2024-07-18

## 2024-07-18 RX ORDER — PROPRANOLOL HYDROCHLORIDE 20 MG/1
20 TABLET ORAL 2 TIMES DAILY
Qty: 180 TABLET | Refills: 4 | Status: SHIPPED | OUTPATIENT
Start: 2024-07-18 | End: 2024-07-30 | Stop reason: DRUGHIGH

## 2024-07-18 RX ORDER — DEXAMETHASONE SODIUM PHOSPHATE 4 MG/ML
10 VIAL (ML) INJECTION ONCE
Status: COMPLETED | OUTPATIENT
Start: 2024-07-18 | End: 2024-07-18

## 2024-07-18 RX ADMIN — DIPHENHYDRAMINE HYDROCHLORIDE 25 MG: 25 CAPSULE ORAL at 11:46

## 2024-07-18 RX ADMIN — DEXAMETHASONE SODIUM PHOSPHATE 10 MG: 4 INJECTION, SOLUTION INTRA-ARTICULAR; INTRALESIONAL; INTRAMUSCULAR; INTRAVENOUS; SOFT TISSUE at 11:57

## 2024-07-18 RX ADMIN — KETOROLAC TROMETHAMINE 60 MG: 30 INJECTION, SOLUTION INTRAMUSCULAR at 11:48

## 2024-07-18 ASSESSMENT — ENCOUNTER SYMPTOMS
HEADACHES: 1
NAUSEA: 1
VOMITING: 1

## 2024-07-18 ASSESSMENT — PAIN SCALES - GENERAL: PAINLEVEL: EXTREME PAIN (9)

## 2024-07-18 ASSESSMENT — PATIENT HEALTH QUESTIONNAIRE - PHQ9
SUM OF ALL RESPONSES TO PHQ QUESTIONS 1-9: 2
10. IF YOU CHECKED OFF ANY PROBLEMS, HOW DIFFICULT HAVE THESE PROBLEMS MADE IT FOR YOU TO DO YOUR WORK, TAKE CARE OF THINGS AT HOME, OR GET ALONG WITH OTHER PEOPLE: NOT DIFFICULT AT ALL
SUM OF ALL RESPONSES TO PHQ QUESTIONS 1-9: 2

## 2024-07-18 NOTE — NURSING NOTE
Patient is here for concerns of migraines for the past week. Is not able to get it to go away.     Patient's last menstrual period was 12/18/2023 (approximate).  Medication Reconciliation: complete    Mag Lane LPN 7/18/2024 11:16 AM       Advance care directive on file? no  Advance care directive provided to patient? no       Mag Lane LPN

## 2024-07-18 NOTE — PROGRESS NOTES
Subjective   Tere is a 49 year old, presenting for the following health issues:  Headache        7/18/2024    11:11 AM   Additional Questions   Roomed by Mag hunter       ICD-10-CM    1. Chronic migraine with aura and with status migrainosus, not intractable  G43.E01 propranolol (INDERAL) 20 MG tablet     Adult Neurology  Referral     ketorolac (TORADOL) injection 60 mg     diphenhydrAMINE (BENADRYL) capsule 25 mg     dexAMETHasone (DECADRON) injectable solution used ORALLY 10 mg      Tere is a very pleasant 49-year-old female here at the clinic today for evaluation of a migraine.  She has been seen for migraines in the past and has been put on both preventative and abortive medications.  Currently she states that she has had a headache/migraine for the past 7 days.  She has had 1 other episode where the migraine lasted 2 and half weeks.  She states that nothing she is doing is helping.  She has tried topiramate, amitriptyline, and Imitrex in the past.  She is also utilized ice packs, rest, and showers.  We will trial propranolol twice daily.  I also gave her a migraine cocktail in the clinic today as she has had results with this in the past.  Referral was placed to neurology due to her chronicity of her migraines.  She is to return to the clinic for any red flag symptoms.    The longitudinal plan of care for the diagnosis(es)/condition(s) as documented were addressed during this visit. Due to the added complexity in care, I will continue to support Tere in the subsequent management and with ongoing continuity of care.    History of Present Illness       She eats 2-3 servings of fruits and vegetables daily.She consumes 4 sweetened beverage(s) daily.She exercises with enough effort to increase her heart rate 20 to 29 minutes per day.  She exercises with enough effort to increase her heart rate 7 days per week.   She is taking medications regularly.     History of migraine.   Longest she has had a  "continuous migraine was 2.5 weeks.  This is day 7 of current migraine  Gets migraines about 3-4 times a month and typically don't last this long.  Has tried imitrex, excedrin migraine, ice-pack to neck, cold showers.        Objective    /82   Pulse 62   Temp 97.1  F (36.2  C) (Tympanic)   Resp 18   Ht 1.727 m (5' 8\")   Wt 93.3 kg (205 lb 9.6 oz)   LMP 12/18/2023 (Approximate)   SpO2 98%   Breastfeeding No   BMI 31.26 kg/m    Body mass index is 31.26 kg/m .  Physical Exam  Constitutional:       General: She is not in acute distress.     Appearance: Normal appearance. She is not ill-appearing.   HENT:      Head: Normocephalic.      Right Ear: Tympanic membrane, ear canal and external ear normal.      Left Ear: Tympanic membrane, ear canal and external ear normal.      Mouth/Throat:      Mouth: Mucous membranes are moist.   Eyes:      Conjunctiva/sclera: Conjunctivae normal.      Pupils: Pupils are equal, round, and reactive to light.   Cardiovascular:      Rate and Rhythm: Normal rate.      Pulses: Normal pulses.   Pulmonary:      Effort: Pulmonary effort is normal.   Skin:     General: Skin is warm and dry.      Comments: On visible skin   Neurological:      General: No focal deficit present.      Mental Status: She is alert and oriented to person, place, and time.   Psychiatric:         Behavior: Behavior normal.          Signed Electronically by: WANDA Wynn CNP    "

## 2024-07-30 ENCOUNTER — OFFICE VISIT (OUTPATIENT)
Dept: INTERNAL MEDICINE | Facility: OTHER | Age: 49
End: 2024-07-30

## 2024-07-30 VITALS
HEART RATE: 70 BPM | WEIGHT: 200 LBS | SYSTOLIC BLOOD PRESSURE: 122 MMHG | BODY MASS INDEX: 30.31 KG/M2 | RESPIRATION RATE: 16 BRPM | OXYGEN SATURATION: 98 % | HEIGHT: 68 IN | DIASTOLIC BLOOD PRESSURE: 76 MMHG

## 2024-07-30 DIAGNOSIS — G43.E01 CHRONIC MIGRAINE WITH AURA AND WITH STATUS MIGRAINOSUS, NOT INTRACTABLE: ICD-10-CM

## 2024-07-30 PROCEDURE — G2211 COMPLEX E/M VISIT ADD ON: HCPCS

## 2024-07-30 PROCEDURE — 99213 OFFICE O/P EST LOW 20 MIN: CPT

## 2024-07-30 RX ORDER — PROPRANOLOL HYDROCHLORIDE 40 MG/1
40 TABLET ORAL 2 TIMES DAILY
Qty: 180 TABLET | Refills: 4 | Status: SHIPPED | OUTPATIENT
Start: 2024-07-30 | End: 2024-08-27

## 2024-07-30 ASSESSMENT — ENCOUNTER SYMPTOMS: HEADACHES: 1

## 2024-07-30 ASSESSMENT — PAIN SCALES - GENERAL: PAINLEVEL: MILD PAIN (3)

## 2024-07-30 NOTE — PATIENT INSTRUCTIONS
Take 40 mg of propranolol twice daily. You can take two of your 20 mg tabs twice daily until they are gone. I sent a new prescription to your pharmacy with 40 mg tabs.    Monitor for symptoms/side effects. If too much you can go back down to 20 mg twice daily.    Follow up with me in 4 weeks (sooner if needed).

## 2024-07-30 NOTE — NURSING NOTE
"Chief Complaint   Patient presents with    Headache       Initial /76   Pulse 70   Resp 16   Ht 1.727 m (5' 8\")   Wt 90.7 kg (200 lb)   LMP 12/18/2023 (Approximate)   SpO2 98%   Breastfeeding No   BMI 30.41 kg/m   Estimated body mass index is 30.41 kg/m  as calculated from the following:    Height as of this encounter: 1.727 m (5' 8\").    Weight as of this encounter: 90.7 kg (200 lb).  Medication Review: complete    The next two questions are to help us understand your food security.  If you are feeling you need any assistance in this area, we have resources available to support you today.          11/9/2023   SDOH- Food Insecurity   Within the past 12 months, did you worry that your food would run out before you got money to buy more? N   Within the past 12 months, did the food you bought just not last and you didn t have money to get more? N            Health Care Directive:  Patient does not have a Health Care Directive or Living Will: Discussed advance care planning with patient; however, patient declined at this time.    Iris John LPN      "

## 2024-07-30 NOTE — LETTER
July 30, 2024      Tere Camarena  [address]   [facility] [id number]        Dear Tere,    After our visit today, I decided to order a MRI of the brain. Neurology is likely going to want recent imaging and I think given your history of continuous migraine we need to rule out any abnormalities in the brain that could be causing the migraines. I put the order in. They will be calling you to schedule. I did try to call you this afternoon but was not able to reach you and your voice mailbox is full. Please reach out with any questions or concerns. I will contact you after the MRI is complete with the results.           Sincerely,        WANDA Wynn CNP

## 2024-07-30 NOTE — PROGRESS NOTES
"      Subjective   Tere is a 49 year old, presenting for the following health issues:  Headache      7/30/2024    12:50 PM   Additional Questions   Roomed by Brandan John LPN   1. Chronic migraine with aura and with status migrainosus, not intractable  Tere presents for follow-up of chronic migraine.  I saw her 1 week ago and started her on propranolol 20 mg twice daily.  She continues with a headache today. Headaches seem to be worse at night.  She does occasionally have nausea and vomiting with the headaches.  I will increase her propranolol to 40 mg twice daily.  She is to monitor for any adverse effects.  She does have a referral in place for neurology.  I also ordered an MRI to rule out any abnormalities of the brain that may be causing the continuous migraines. Most recent imaging was 2022. I will follow-up with her on results once I receive them. She should continue with the plan to meet with neurology and follow-up with me in 4 weeks.  She can reach out via [employer]  sooner if needed.  - propranolol (INDERAL) 40 MG tablet; Take 1 tablet (40 mg) by mouth 2 times daily  Dispense: 180 tablet; Refill: 4  - MR Brain w/o Contrast; Future    The longitudinal plan of care for the diagnosis(es)/condition(s) as documented were addressed during this visit. Due to the added complexity in care, I will continue to support Tere in the subsequent management and with ongoing continuity of care.    Headache     Continues with headache.   Propranolol may be helping some  Occasional N/V with HA  Worse at night      Objective    /76   Pulse 70   Resp 16   Ht 1.727 m (5' 8\")   Wt 90.7 kg (200 lb)   LMP 12/18/2023 (Approximate)   SpO2 98%   Breastfeeding No   BMI 30.41 kg/m    Body mass index is 30.41 kg/m .  Physical Exam  Constitutional:       General: She is not in acute distress.     Appearance: Normal appearance. She is not ill-appearing.   HENT:      Head: Normocephalic.      Nose: No congestion.   Eyes:    "   Conjunctiva/sclera: Conjunctivae normal.      Pupils: Pupils are equal, round, and reactive to light.   Cardiovascular:      Rate and Rhythm: Normal rate and regular rhythm.      Pulses: Normal pulses.   Pulmonary:      Effort: Pulmonary effort is normal.   Musculoskeletal:         General: Normal range of motion.   Neurological:      Mental Status: She is alert and oriented to person, place, and time.   Psychiatric:         Behavior: Behavior normal.          Signed Electronically by: WANDA Wynn CNP

## 2024-08-15 ENCOUNTER — MYC MEDICAL ADVICE (OUTPATIENT)
Dept: INTERNAL MEDICINE | Facility: OTHER | Age: 49
End: 2024-08-15
Payer: COMMERCIAL

## 2024-08-15 DIAGNOSIS — G43.E11 INTRACTABLE CHRONIC MIGRAINE WITH AURA WITH STATUS MIGRAINOSUS: Primary | ICD-10-CM

## 2024-08-15 NOTE — TELEPHONE ENCOUNTER
Per OV from 7/12/24:    Tere is a very pleasant 49-year-old female here at the clinic today for evaluation of a migraine.  She has been seen for migraines in the past and has been put on both preventative and abortive medications.  Currently she states that she has had a headache/migraine for the past 7 days.  She has had 1 other episode where the migraine lasted 2 and half weeks.  She states that nothing she is doing is helping.  She has tried topiramate, amitriptyline, and Imitrex in the past.  She is also utilized ice packs, rest, and showers.  We will trial propranolol twice daily.  I also gave her a migraine cocktail in the clinic today as she has had results with this in the past.  Referral was placed to neurology due to her chronicity of her migraines.  She is to return to the clinic for any red flag symptoms.     Per OV from 7/30/24:    1. Chronic migraine with aura and with status migrainosus, not intractable  Tere presents for follow-up of chronic migraine.  I saw her 1 week ago and started her on propranolol 20 mg twice daily.  She continues with a headache today. Headaches seem to be worse at night.  She does occasionally have nausea and vomiting with the headaches.  I will increase her propranolol to 40 mg twice daily.  She is to monitor for any adverse effects.  She does have a referral in place for neurology.  I also ordered an MRI to rule out any abnormalities of the brain that may be causing the continuous migraines. Most recent imaging was 2022. I will follow-up with her on results once I receive them. She should continue with the plan to meet with neurology and follow-up with me in 4 weeks.  [facility]  sooner if needed.  - propranolol (INDERAL) 40 MG tablet; Take 1 tablet (40 mg) by mouth 2 times daily  Dispense: 180 tablet; Refill: 4  - MR Brain w/o Contrast; Future     The longitudinal plan of care for the diagnosis(es)/condition(s) as documented were addressed during this  visit. Due to the added complexity in care, I will continue to support Tere in the subsequent management and with ongoing continuity of care.     Headache      Continues with headache.   Propranolol may be helping some  Occasional N/V with HA  Worse at night    Franny Ansari RN on 8/15/2024 at 10:47 AM

## 2024-08-16 ENCOUNTER — HOSPITAL ENCOUNTER (OUTPATIENT)
Dept: MRI IMAGING | Facility: OTHER | Age: 49
Discharge: HOME | End: 2024-08-16
Payer: COMMERCIAL

## 2024-08-16 DIAGNOSIS — G43.E01 CHRONIC MIGRAINE WITH AURA AND WITH STATUS MIGRAINOSUS, NOT INTRACTABLE: ICD-10-CM

## 2024-08-16 PROCEDURE — 255N000002 HC RX 255 OP 636

## 2024-08-16 PROCEDURE — A9575 INJ GADOTERATE MEGLUMI 0.1ML: HCPCS

## 2024-08-16 PROCEDURE — 70553 MRI BRAIN STEM W/O & W/DYE: CPT

## 2024-08-16 RX ADMIN — GADOTERATE MEGLUMINE 18 ML: 376.9 INJECTION INTRAVENOUS at 12:54

## 2024-08-16 NOTE — TELEPHONE ENCOUNTER
I sent a prescription for Diclofenac to [facility] pharmacy. She can take it twice daily as needed for headache. I see she is scheduled for MRI today and a follow-up with me later this month. Has she heard from neurology?

## 2024-08-16 NOTE — TELEPHONE ENCOUNTER
Yes, continue the propranolol. The new medication is a stronger NSAID. Different class of medication than the propranolol. I am glad that she will be seeing neurology soon.

## 2024-08-27 ENCOUNTER — OFFICE VISIT (OUTPATIENT)
Dept: INTERNAL MEDICINE | Facility: OTHER | Age: 49
End: 2024-08-27
Payer: COMMERCIAL

## 2024-08-27 VITALS
DIASTOLIC BLOOD PRESSURE: 62 MMHG | HEART RATE: 55 BPM | SYSTOLIC BLOOD PRESSURE: 118 MMHG | TEMPERATURE: 97.6 F | RESPIRATION RATE: 16 BRPM | OXYGEN SATURATION: 99 % | BODY MASS INDEX: 31.23 KG/M2 | WEIGHT: 205.4 LBS

## 2024-08-27 DIAGNOSIS — G43.E11 INTRACTABLE CHRONIC MIGRAINE WITH AURA WITH STATUS MIGRAINOSUS: Primary | ICD-10-CM

## 2024-08-27 DIAGNOSIS — Z13.220 LIPID SCREENING: ICD-10-CM

## 2024-08-27 LAB
ALBUMIN SERPL BCG-MCNC: 4.2 G/DL (ref 3.5–5.2)
ALP SERPL-CCNC: 65 U/L (ref 40–150)
ALT SERPL W P-5'-P-CCNC: 24 U/L (ref 0–50)
ANION GAP SERPL CALCULATED.3IONS-SCNC: 9 MMOL/L (ref 7–15)
AST SERPL W P-5'-P-CCNC: 26 U/L (ref 0–45)
BILIRUB SERPL-MCNC: 0.7 MG/DL
BUN SERPL-MCNC: 10.8 MG/DL (ref 6–20)
CALCIUM SERPL-MCNC: 9.2 MG/DL (ref 8.8–10.4)
CHLORIDE SERPL-SCNC: 106 MMOL/L (ref 98–107)
CHOLEST SERPL-MCNC: 149 MG/DL
CREAT SERPL-MCNC: 0.72 MG/DL (ref 0.51–0.95)
EGFRCR SERPLBLD CKD-EPI 2021: >90 ML/MIN/1.73M2
ERYTHROCYTE [DISTWIDTH] IN BLOOD BY AUTOMATED COUNT: 11.8 % (ref 10–15)
FASTING STATUS PATIENT QL REPORTED: YES
FASTING STATUS PATIENT QL REPORTED: YES
GLUCOSE SERPL-MCNC: 88 MG/DL (ref 70–99)
HCO3 SERPL-SCNC: 25 MMOL/L (ref 22–29)
HCT VFR BLD AUTO: 40.5 % (ref 35–47)
HDLC SERPL-MCNC: 49 MG/DL
HGB BLD-MCNC: 13.6 G/DL (ref 11.7–15.7)
LDLC SERPL CALC-MCNC: 83 MG/DL
MCH RBC QN AUTO: 33.7 PG (ref 26.5–33)
MCHC RBC AUTO-ENTMCNC: 33.6 G/DL (ref 31.5–36.5)
MCV RBC AUTO: 101 FL (ref 78–100)
NONHDLC SERPL-MCNC: 100 MG/DL
PLATELET # BLD AUTO: 186 10E3/UL (ref 150–450)
POTASSIUM SERPL-SCNC: 4.3 MMOL/L (ref 3.4–5.3)
PROT SERPL-MCNC: 6.8 G/DL (ref 6.4–8.3)
RBC # BLD AUTO: 4.03 10E6/UL (ref 3.8–5.2)
SODIUM SERPL-SCNC: 140 MMOL/L (ref 135–145)
TRIGL SERPL-MCNC: 83 MG/DL
TSH SERPL DL<=0.005 MIU/L-ACNC: 1.56 UIU/ML (ref 0.3–4.2)
WBC # BLD AUTO: 7.1 10E3/UL (ref 4–11)

## 2024-08-27 PROCEDURE — 84443 ASSAY THYROID STIM HORMONE: CPT | Mod: ZL

## 2024-08-27 PROCEDURE — 82247 BILIRUBIN TOTAL: CPT | Mod: ZL

## 2024-08-27 PROCEDURE — 80061 LIPID PANEL: CPT | Mod: ZL

## 2024-08-27 PROCEDURE — 85027 COMPLETE CBC AUTOMATED: CPT | Mod: ZL

## 2024-08-27 PROCEDURE — 36415 COLL VENOUS BLD VENIPUNCTURE: CPT | Mod: ZL

## 2024-08-27 PROCEDURE — 99214 OFFICE O/P EST MOD 30 MIN: CPT

## 2024-08-27 PROCEDURE — G2211 COMPLEX E/M VISIT ADD ON: HCPCS

## 2024-08-27 PROCEDURE — G0463 HOSPITAL OUTPT CLINIC VISIT: HCPCS

## 2024-08-27 RX ORDER — RIZATRIPTAN BENZOATE 10 MG/1
10 TABLET ORAL
Qty: 120 TABLET | Refills: 1 | Status: SHIPPED | OUTPATIENT
Start: 2024-08-27

## 2024-08-27 ASSESSMENT — PAIN SCALES - GENERAL: PAINLEVEL: MODERATE PAIN (4)

## 2024-08-27 NOTE — NURSING NOTE
"Chief Complaint   Patient presents with    RECHECK     migrane     Patient presents to the clinic today for a recheck for migraine  Initial LMP 12/18/2023 (Approximate)  Estimated body mass index is 30.41 kg/m  as calculated from the following:    Height as of 7/30/24: 1.727 m (5' 8\").    Weight as of 7/30/24: 90.7 kg (200 lb).  Meds Reconciled: complete      Muriel Velasquez LPN,LPN on 8/27/2024 at 10:21 AM  Ext. [phone]        Muriel Velasquez LPN  "

## 2024-09-06 ENCOUNTER — OFFICE VISIT (OUTPATIENT)
Dept: FAMILY MEDICINE | Facility: OTHER | Age: 49
End: 2024-09-06
Attending: STUDENT IN AN ORGANIZED HEALTH CARE EDUCATION/TRAINING PROGRAM
Payer: COMMERCIAL

## 2024-09-06 VITALS
HEART RATE: 67 BPM | TEMPERATURE: 98.1 F | OXYGEN SATURATION: 98 % | WEIGHT: 204.3 LBS | DIASTOLIC BLOOD PRESSURE: 65 MMHG | BODY MASS INDEX: 30.96 KG/M2 | RESPIRATION RATE: 14 BRPM | HEIGHT: 68 IN | SYSTOLIC BLOOD PRESSURE: 105 MMHG

## 2024-09-06 DIAGNOSIS — R10.9 FLANK PAIN: Primary | ICD-10-CM

## 2024-09-06 LAB
ALBUMIN UR-MCNC: NEGATIVE MG/DL
ANION GAP SERPL CALCULATED.3IONS-SCNC: 9 MMOL/L (ref 7–15)
APPEARANCE UR: CLEAR
BASOPHILS # BLD AUTO: 0 10E3/UL (ref 0–0.2)
BASOPHILS NFR BLD AUTO: 1 %
BILIRUB UR QL STRIP: NEGATIVE
BUN SERPL-MCNC: 10 MG/DL (ref 6–20)
CALCIUM SERPL-MCNC: 9.2 MG/DL (ref 8.8–10.4)
CHLORIDE SERPL-SCNC: 105 MMOL/L (ref 98–107)
COLOR UR AUTO: YELLOW
CREAT SERPL-MCNC: 0.6 MG/DL (ref 0.51–0.95)
CRP SERPL-MCNC: <3 MG/L
EGFRCR SERPLBLD CKD-EPI 2021: >90 ML/MIN/1.73M2
EOSINOPHIL # BLD AUTO: 0.1 10E3/UL (ref 0–0.7)
EOSINOPHIL NFR BLD AUTO: 2 %
ERYTHROCYTE [DISTWIDTH] IN BLOOD BY AUTOMATED COUNT: 11.9 % (ref 10–15)
GLUCOSE SERPL-MCNC: 93 MG/DL (ref 70–99)
GLUCOSE UR STRIP-MCNC: NEGATIVE MG/DL
HCO3 SERPL-SCNC: 25 MMOL/L (ref 22–29)
HCT VFR BLD AUTO: 40.9 % (ref 35–47)
HGB BLD-MCNC: 13.9 G/DL (ref 11.7–15.7)
HGB UR QL STRIP: NEGATIVE
IMM GRANULOCYTES # BLD: 0 10E3/UL
IMM GRANULOCYTES NFR BLD: 0 %
KETONES UR STRIP-MCNC: NEGATIVE MG/DL
LEUKOCYTE ESTERASE UR QL STRIP: NEGATIVE
LYMPHOCYTES # BLD AUTO: 2.1 10E3/UL (ref 0.8–5.3)
LYMPHOCYTES NFR BLD AUTO: 28 %
MCH RBC QN AUTO: 34 PG (ref 26.5–33)
MCHC RBC AUTO-ENTMCNC: 34 G/DL (ref 31.5–36.5)
MCV RBC AUTO: 100 FL (ref 78–100)
MONOCYTES # BLD AUTO: 0.5 10E3/UL (ref 0–1.3)
MONOCYTES NFR BLD AUTO: 6 %
NEUTROPHILS # BLD AUTO: 4.7 10E3/UL (ref 1.6–8.3)
NEUTROPHILS NFR BLD AUTO: 63 %
NITRATE UR QL: NEGATIVE
NRBC # BLD AUTO: 0 10E3/UL
NRBC BLD AUTO-RTO: 0 /100
PH UR STRIP: 7 [PH] (ref 5–9)
PLATELET # BLD AUTO: 152 10E3/UL (ref 150–450)
POTASSIUM SERPL-SCNC: 3.9 MMOL/L (ref 3.4–5.3)
RBC # BLD AUTO: 4.09 10E6/UL (ref 3.8–5.2)
RBC URINE: 1 /HPF
SODIUM SERPL-SCNC: 139 MMOL/L (ref 135–145)
SP GR UR STRIP: 1 (ref 1–1.03)
UROBILINOGEN UR STRIP-MCNC: NORMAL MG/DL
WBC # BLD AUTO: 7.4 10E3/UL (ref 4–11)
WBC URINE: <1 /HPF

## 2024-09-06 PROCEDURE — 85025 COMPLETE CBC W/AUTO DIFF WBC: CPT | Mod: ZL

## 2024-09-06 PROCEDURE — 86140 C-REACTIVE PROTEIN: CPT | Mod: ZL

## 2024-09-06 PROCEDURE — 80048 BASIC METABOLIC PNL TOTAL CA: CPT | Mod: ZL

## 2024-09-06 PROCEDURE — G0463 HOSPITAL OUTPT CLINIC VISIT: HCPCS

## 2024-09-06 PROCEDURE — 36415 COLL VENOUS BLD VENIPUNCTURE: CPT | Mod: ZL

## 2024-09-06 PROCEDURE — 81001 URINALYSIS AUTO W/SCOPE: CPT | Mod: ZL

## 2024-09-06 PROCEDURE — 99214 OFFICE O/P EST MOD 30 MIN: CPT

## 2024-09-06 ASSESSMENT — PAIN SCALES - GENERAL: PAINLEVEL: SEVERE PAIN (6)

## 2024-09-06 NOTE — PROGRESS NOTES
"No chief complaint on file.          Initial LMP 12/18/2023 (Approximate)  Estimated body mass index is 31.23 kg/m  as calculated from the following:    Height as of 7/30/24: 1.727 m (5' 8\").    Weight as of 8/27/24: 93.2 kg (205 lb 6.4 oz).     FOOD SECURITY SCREENING QUESTIONS:    The next two questions are to help us understand your food security.  If you are feeling you need any assistance in this area, we have resources available to support you today.    Hunger Vital Signs:    Maximiliano Regan   "

## 2024-09-06 NOTE — NURSING NOTE
"Chief Complaint   Patient presents with    Kidney Problem     [facility] complaints of kidney pain. Patient states she has had kidney stones before and the pain feels similar but the \"burning\" is not there. Patient stated that the pain started last night at 9 pm. Patient states the pain is not as bad if she has something to support her back but when she bears her own weight the pain gets worse. Patient states that the pain is on the left side lower back.       Initial /65 (BP Location: Right arm, Patient Position: Sitting, Cuff Size: Adult Regular)   Pulse 67   Temp 98.1  F (36.7  C) (Temporal)   Resp 14   Ht 1.727 m (5' 8\")   Wt 92.7 kg (204 lb 4.8 oz)   LMP 12/18/2023 (Approximate)   SpO2 98%   BMI 31.06 kg/m   Estimated body mass index is 31.06 kg/m  as calculated from the following:    Height as of this encounter: 1.727 m (5' 8\").    Weight as of this encounter: 92.7 kg (204 lb 4.8 oz).     FOOD SECURITY SCREENING QUESTIONS:    The next two questions are to help us understand your food security.  If you are feeling you need any assistance in this area, we have resources available to support you today.    Hunger Vital Signs:  Within the past 12 months we worried whether our food would run out before we got money to buy more. Never  Within the past 12 months the food we bought just didn't last and we didn't have money to get more. Never      Maximiliano Regan   "

## 2024-09-06 NOTE — PROGRESS NOTES
ASSESSMENT/PLAN:    (R10.9) Flank pain  (primary encounter diagnosis)  Comment: Patient presents to clinic with less than one day history of left flank pain, urinary frequency, and dark colored urine. She has increased her fluid intake. She denies any fevers, chills, nausea, or vomiting.  Upon examination today vital signs are stable, she is afebrile, does have some mild CVA tenderness and suprapubic tenderness.  There was tenderness to left para-lumbar muscle. Urinalysis negative for blood, nitrites, and leukocyte esterase. There was 1 RBC and <1 WBC. Her lab work was stable today with a WBC of 7.4, CRP of <3, and stable electrolyte and kidney function. Reassurance was provided that there was no indication of acute infection or need for further testing at this time. Suspect there may be musculoskeletal involvement. At this time recommend good fluid intake and close follow up for new onset fever or chills, or worsening or persisting symptoms. Patient verbalized understanding and agreement with plan.   Plan: UA with Microscopic reflex to Culture, Basic         Metabolic Panel, CRP inflammation, CBC and         Differential    May use over-the-counter Tylenol or ibuprofen PRN    Discussed warning signs/symptoms indicative of need to f/u    Follow up if symptoms persist or worsen or concerns    I have reviewed the nursing notes.  I have reviewed the findings, diagnosis, plan and need for follow up with the patient.    I explained my diagnostic considerations and recommendations to the patient, who voiced understanding and agreement with the treatment plan. All questions were answered. We discussed potential side effects of any prescribed or recommended therapies, as well as expectations for response to treatments.    Tara Russ)  9/6/2024  10:58 AM    I was present with student Tara Leary, NP student from the [facility], who participated in the service and documentation of this note.  I  have verified the history and personally performed the physical exam, medical decision making and have verified the content of this note, which accurately reflects my assessment of the patient and the plan of care.    Rosalie Hong, CNP    HPI:    Tere Camarena is a 49 year old female  who presents to [facility] today for concerns of left flank pain.     Patient reports left flank pain that began around nine pm last night. States the pain awoke her from sleep a couple of times overnight and is now worse this morning, especially with movement. Has increased urinary frequency and dark colored urine today. She has been increasing her fluid intake since yesterday. Denies blood in her urine. No fever or chills. No nausea or vomiting. Having regular bowel movements. No known injury to the area.     Denies history of urinary tract infections. Has had kidney stones many years ago.     No medication allergies.     PCP: Araceli Cabrera CNP    No past medical history on file.  Past Surgical History:   Procedure Laterality Date    ABDOMEN SURGERY      hernia surgery X5    ARTHROSCOPY ANKLE, OPEN REPAIR LIGAMENT, COMBINED Right 2/16/2023    Procedure: ARTHROSCOPY, ANKLE, OPEN REPAIR OF ANKLE LIGAMENT, Osteochondral lesion of talus debridement;  Surgeon: Jose David Sibley DPM;  Location:  OR    BIOPSY BREAST NEEDLE LOCALIZATION Left 01/30/2017    Procedure: BIOPSY BREAST NEEDLE LOCALIZATION;  Surgeon: Sita Rushing MD;  Location: [facility]    BREAST SURGERY Left 09/28/2016    biopsy    BREAST SURGERY Left 09/13/2016    biopsy    COLONOSCOPY N/A 02/18/2021    Procedure: COLONOSCOPY, WITH POLYPECTOMY AND BIOPSY, and hemorrhoid banding;  Surgeon: Naeem Camarena MD;  Location:  OR    GYN SURGERY  2004    tubal ligation    ORTHOPEDIC SURGERY Left 10/31/2016    left knee medial retinacular repair, VMO advancement, and reapir of partial tear of quadriceps tendon    ORTHOPEDIC SURGERY Left 04/18/2017    left knee manipulation     "TUBAL LIGATION       Social History     Tobacco Use    Smoking status: Some Days     Current packs/day: 1.00     Average packs/day: 1 pack/day for 20.0 years (20.0 ttl pk-yrs)     Types: Cigarettes     Passive exposure: Current    Smokeless tobacco: Never   Substance Use Topics    Alcohol use: Yes     Alcohol/week: 5.0 standard drinks of alcohol     Types: 5 Cans of beer per week     Comment: sometimes     Current Outpatient Medications   Medication Sig Dispense Refill    cromolyn (OPTICROM) 4 % ophthalmic solution Place 1 drop into both eyes 4 times daily (Patient taking differently: Place 1 drop into both eyes as needed.) 10 mL 0    rizatriptan (MAXALT) 10 MG tablet Take 1 tablet (10 mg) by mouth at onset of headache for migraine. May repeat in 2 hours. Max 3 tablets/24 hours. 120 tablet 1    diclofenac (VOLTAREN) 50 MG EC tablet Take 1 tablet (50 mg) by mouth 2 times daily as needed for moderate pain (Patient not taking: Reported on 9/6/2024) 180 tablet 1     Allergies   Allergen Reactions    Peanuts [Peanut-Containing Drug Products]      Eyes swell    Trees Other (See Comments)     Tree sap makes her eyes swell shut     Past medical history, past surgical history, current medications and allergies reviewed and accurate to the best of my knowledge.      ROS:  Refer to HPI    /65 (BP Location: Right arm, Patient Position: Sitting, Cuff Size: Adult Regular)   Pulse 67   Temp 98.1  F (36.7  C) (Temporal)   Resp 14   Ht 1.727 m (5' 8\")   Wt 92.7 kg (204 lb 4.8 oz)   LMP 12/18/2023 (Approximate)   SpO2 98%   BMI 31.06 kg/m      EXAM:  General Appearance: Well appearing 49 year old female, appropriate appearance for age. No acute distress   Eyes: conjunctivae normal without erythema or irritation, corneas clear, no drainage or crusting, no eyelid swelling, pupils equal   Respiratory: normal chest wall and respirations.  Normal effort.  Clear to auscultation bilaterally, no wheezing, crackles or rhonchi.  " No increased work of breathing.  No cough appreciated.  Cardiac: RRR with no murmurs  Abdomen: soft, nontender, no rigidity, no rebound tenderness or guarding, normal bowel sounds present  :  Mild suprapubic tenderness to palpation.  Present left sided CVA tenderness to palpation.    Musculoskeletal:  Equal movement of bilateral upper extremities.  Equal movement of bilateral lower extremities.  Normal gait. Tenderness to left para-lumbar.     Dermatological: no rashes noted of exposed skin  Neuro: Alert and oriented to person, place, and time.  Cranial nerves II-XII grossly intact with no focal or lateralizing deficits.  Muscle tone normal.  Gait normal. No tremor.   Psychological: normal affect, alert, oriented, and pleasant.     Labs:  Results for orders placed or performed in visit on 09/06/24   UA with Microscopic reflex to Culture     Status: Normal    Specimen: Urine, Midstream   Result Value Ref Range    Color Urine Yellow Colorless, Straw, Light Yellow, Yellow    Appearance Urine Clear Clear    Glucose Urine Negative Negative mg/dL    Bilirubin Urine Negative Negative    Ketones Urine Negative Negative mg/dL    Specific Gravity Urine 1.005 1.000 - 1.030    Blood Urine Negative Negative    pH Urine 7.0 5.0 - 9.0    Protein Albumin Urine Negative Negative mg/dL    Urobilinogen Urine Normal Normal, 2.0 mg/dL    Nitrite Urine Negative Negative    Leukocyte Esterase Urine Negative Negative    RBC Urine 1 <=2 /HPF    WBC Urine <1 <=5 /HPF    Narrative    Urine Culture not indicated   Basic Metabolic Panel     Status: Normal   Result Value Ref Range    Sodium 139 135 - 145 mmol/L    Potassium 3.9 3.4 - 5.3 mmol/L    Chloride 105 98 - 107 mmol/L    Carbon Dioxide (CO2) 25 22 - 29 mmol/L    Anion Gap 9 7 - 15 mmol/L    Urea Nitrogen 10.0 6.0 - 20.0 mg/dL    Creatinine 0.60 0.51 - 0.95 mg/dL    GFR Estimate >90 >60 mL/min/1.73m2    Calcium 9.2 8.8 - 10.4 mg/dL    Glucose 93 70 - 99 mg/dL   CRP inflammation      Status: Normal   Result Value Ref Range    CRP Inflammation <3.00 <5.00 mg/L   CBC with platelets and differential     Status: Abnormal   Result Value Ref Range    WBC Count 7.4 4.0 - 11.0 10e3/uL    RBC Count 4.09 3.80 - 5.20 10e6/uL    Hemoglobin 13.9 11.7 - 15.7 g/dL    Hematocrit 40.9 35.0 - 47.0 %     78 - 100 fL    MCH 34.0 (H) 26.5 - 33.0 pg    MCHC 34.0 31.5 - 36.5 g/dL    RDW 11.9 10.0 - 15.0 %    Platelet Count 152 150 - 450 10e3/uL    % Neutrophils 63 %    % Lymphocytes 28 %    % Monocytes 6 %    % Eosinophils 2 %    % Basophils 1 %    % Immature Granulocytes 0 %    NRBCs per 100 WBC 0 <1 /100    Absolute Neutrophils 4.7 1.6 - 8.3 10e3/uL    Absolute Lymphocytes 2.1 0.8 - 5.3 10e3/uL    Absolute Monocytes 0.5 0.0 - 1.3 10e3/uL    Absolute Eosinophils 0.1 0.0 - 0.7 10e3/uL    Absolute Basophils 0.0 0.0 - 0.2 10e3/uL    Absolute Immature Granulocytes 0.0 <=0.4 10e3/uL    Absolute NRBCs 0.0 10e3/uL   CBC and Differential     Status: Abnormal    Narrative    The following orders were created for panel order CBC and Differential.  Procedure                               Abnormality         Status                     ---------                               -----------         ------                     CBC with platelets and d...[[id number]]  Abnormal            Final result                 Please view results for these tests on the individual orders.

## 2024-11-02 ENCOUNTER — HEALTH MAINTENANCE LETTER (OUTPATIENT)
Age: 49
End: 2024-11-02

## 2024-11-27 NOTE — PROGRESS NOTES
"  Subjective   Tere is a 49 year old, presenting for the following health issues:  RECHECK (migrane)      8/27/2024    10:20 AM   Additional Questions   Roomed by Muriel POSADA       ICD-10-CM    1. Intractable chronic migraine with aura with status migrainosus  G43.E11 rizatriptan (MAXALT) 10 MG tablet     CBC W PLT No Diff     Comprehensive Metabolic Panel     TSH Reflex GH      2. Lipid screening  Z13.220 Lipid Panel      Tere presents for follow-up of chronic migraine.  She has been on Imitrex, propranolol, and voltaren and continues to have daily headaches. MRI was unremarkable.  Today I switched her Imitrex to rizatriptan.  CBC, BMP, lipid profile unremarkable.  Pending TSH.  She has trialed chiro and has had new glasses.  She has also been monitoring her food intake to see if that is contributing to her headaches.  She has an appointment at [facility] with neurology for further workup in the beginning of September.  She can follow-up with me after as needed.    The longitudinal plan of care for the diagnosis(es)/condition(s) as documented were addressed during this visit. Due to the added complexity in care, I will continue to support Tere in the subsequent management and with ongoing continuity of care.    History of Present Illness       Headaches:   Since the patient's last clinic visit, headaches are: no change  The patient is getting headaches:  Everyday  She is not able to do normal daily activities when she has a migraine.  The patient is taking the following rescue/relief medications:  Ibuprofen (Advil, Motrin), Tylenol, sumatriptan (Imitrex) and other   Patient states \"The relief is inconsistent\" from the rescue/relief medications.   The patient is taking the following medications to prevent migraines:  Other  In the past 4 weeks, the patient has gone to an Urgent Care or Emergency Room 1 time times due to headaches.    She eats 2-3 servings of fruits and vegetables daily.She consumes 3 sweetened " [facility] ([facility] Hospitalist Progress Note    Patient Name: Sourav River   Date: 11/27/24    Subjective and Overnight Events:  No acute events overnight. Pain well controlled. No cp/sob. No abd pain. Ccollar cleared. On 3L NC.     Home Medications:  Prior to Admission medications    Medication Sig Start Date End Date Taking? Authorizing Provider   Valbenazine Tosylate 60 MG Cap Take 1 capsule by mouth nightly.   Yes Provider, Outside   simvastatin (ZOCOR) 10 MG tablet Take 10 mg by mouth nightly.   Yes Provider, Outside   vitamin E 200 units capsule Take 200 Units by mouth daily.   Yes Provider, Outside   aspirin (ECOTRIN) 81 MG EC tablet Take 81 mg by mouth nightly.   Yes Provider, Outside   amLODIPine (NORVASC) 10 MG tablet Take 10 mg by mouth daily.   Yes Provider, Outside   Insulin Glargine, 2 Unit Dial, (Toujeo Max SoloStar) 300 UNIT/ML pen-injector Inject 10 Units into the skin nightly. Prime 4 units before each dose. Hold and resume once blood sugars start to go up  Patient taking differently: Inject 21 Units into the skin nightly. Prime 4 units before each dose. Hold and resume once blood sugars start to go up 1/17/24  Yes Mathieu Rosa MD   acetaminophen (TYLENOL) 325 MG tablet Take 2 tablets by mouth every 6 hours as needed for Pain. 1/17/24  Yes Mathieu Rosa MD   dapagliflozin (Farxiga) 10 MG tablet Take 10 mg by mouth every morning.   Yes Provider, Outside   buPROPion XL (WELLBUTRIN XL) 150 MG 24 hr tablet Take 150 mg by mouth at bedtime.   Yes Provider, Outside   DULoxetine (CYMBALTA) 60 MG capsule Take 60 mg by mouth in the morning and 60 mg in the evening.   Yes Provider, Outside   Brexpiprazole 1 MG Tab Take 1 mg by mouth every evening.   Yes Provider, Outside   modafinil (PROVIGIL) 200 MG tablet Take 200 mg by mouth daily.    Provider, Outside       Inpatient Medications:  Current Facility-Administered Medications   Medication Dose Route Frequency Provider Last Rate Last  Admin    acetaminophen (TYLENOL) tablet 650 mg  650 mg Oral 4 times per day Colin, JemimaTONNY alfredo   650 mg at 11/27/24 1215    Or    acetaminophen (TYLENOL) suppository 650 mg  650 mg Rectal 4 times per day ColinJemima preciado, APNP        docusate sodium-sennosides (SENOKOT S) 50-8.6 MG 1 tablet  1 tablet Oral Nightly ColinJemima preciado APNP   1 tablet at 11/26/24 2059    polyethylene glycol (MIRALAX) packet 17 g  17 g Oral Daily ColinJemima preciado APNP   17 g at 11/27/24 0857    heparin (porcine) injection 5,000 Units  5,000 Units Subcutaneous 3 times per day ColinJemima preciado APNP   5,000 Units at 11/27/24 0528    insulin lispro (ADMELOG,HumaLOG) - Correction Dose   Subcutaneous TID WC Anita Nevarez MD   1 Units at 11/26/24 1857    insulin lispro (ADMELOG,HumaLOG) - Correction Dose   Subcutaneous Nightly Anita Nevarez MD        sodium chloride 0.9 % injection 2 mL  2 mL Intracatheter 2 times per day Quintin Leyva, DO   2 mL at 11/27/24 0857    amLODIPine (NORVASC) tablet 10 mg  10 mg Oral Daily Paula Milian DO   10 mg at 11/27/24 0857    buPROPion XL (WELLBUTRIN XL) tablet 150 mg  150 mg Oral QHS Paula Milian DO   150 mg at 11/26/24 2059    modafinil (PROVIGIL) tablet 200 mg  200 mg Oral Daily Paula Milian DO   200 mg at 11/27/24 0857    Brexpiprazole Tab 1 mg  1 mg Oral Q Evening Paula Milian DO   1 mg at 11/26/24 1942    NON FORMULARY    See Admin Instructions Paula Milian DO        [Held by provider] DULoxetine (CYMBALTA) capsule 60 mg  60 mg Oral BID Paula Milian DO   60 mg at 11/26/24 0930    atorvastatin (LIPITOR) tablet 10 mg  10 mg Oral Nightly Paula Milian DO   10 mg at 11/26/24 2059      Current Facility-Administered Medications   Medication Dose Route Frequency Provider Last Rate Last Admin    magnesium hydroxide (MILK OF MAGNESIA) 400 MG/5ML suspension 30 mL  30 mL Oral Daily PRN Jemima Shaw, APNP        bisacodyl (DULCOLAX) suppository 10 mg  10 mg Rectal Daily PRN Colin, Jemima, APNP         beverage(s) daily.She exercises with enough effort to increase her heart rate 20 to 29 minutes per day.  She exercises with enough effort to increase her heart rate 7 days per week. She is missing 1 dose(s) of medications per week.     Continues with daily headache  Has had only 3 days without         Objective    LMP 12/18/2023 (Approximate)   There is no height or weight on file to calculate BMI.  Physical Exam  Constitutional:       General: She is not in acute distress.     Appearance: Normal appearance. She is not ill-appearing.   HENT:      Head: Normocephalic.   Cardiovascular:      Rate and Rhythm: Normal rate.      Pulses: Normal pulses.   Pulmonary:      Effort: Pulmonary effort is normal.   Skin:     General: Skin is warm and dry.   Neurological:      Mental Status: She is alert and oriented to person, place, and time.          WANDA Wynn CNP on 8/27/2024 at 11:41 AM     dextrose 50 % injection 25 g  25 g Intravenous PRN Anita Nevarez MD        dextrose 50 % injection 12.5 g  12.5 g Intravenous PRN Anita Nevarez MD        glucagon (GLUCAGEN) injection 1 mg  1 mg Intramuscular PRN Anita Nevarez MD        dextrose (GLUTOSE) 40 % gel 15 g  15 g Oral PRN Anita Nevarez MD        dextrose (GLUTOSE) 40 % gel 30 g  30 g Oral PRN Anita Nevarez MD        sodium chloride 0.9 % injection 10 mL  10 mL Intravenous PRN Bettysting Quintin, DO        oxyCODONE (IMM REL) (ROXICODONE) tablet 5 mg  5 mg Oral Q6H PRN Kersting, Quintin, DO   5 mg at 11/27/24 0955    ondansetron (ZOFRAN ODT) disintegrating tablet 4 mg  4 mg Oral Q6H PRN Kersting, Quintin, DO        Or    ondansetron (ZOFRAN) injection 4 mg  4 mg Intravenous Q6H PRN Kersting, Quintin, DO   4 mg at 11/26/24 0921        Objective:  Temp:  [98.1 °F (36.7 °C)-98.8 °F (37.1 °C)] 98.1 °F (36.7 °C)  Heart Rate:  [67-88] 71  Resp:  [13-18] 13  BP: (105-151)/(64-71) 136/68       Intake/Output Summary (Last 24 hours) at 11/27/2024 1313  Last data filed at 11/27/2024 0447  Gross per 24 hour   Intake 698.81 ml   Output 300 ml   Net 398.81 ml       Physical Exam:  General: Alert and oriented, No acute distress,   Eyes: Normal conjunctiva, No scleral icterus  HENT: Moist oral mucosa, No pharyngeal  erythema. No exudates  Neck: supple  Respiratory: Clear to auscultation bilaterally, BS equal, No wheezing, rales or crackles  Cardiovascular: Regular rate, regular rhythm, No murmurs. 2+ DP pulses bilaterally  Gastrointestinal: Soft, nontender, nondistended, Normal BS  Genitourinary: No CVA tenderness  Extremities: No LE edema or erythema bilaterally  Musculoskeletal: R shoulder in a sling   Skin: Warm, dry, no rashes  Neurologic: Alert and oriented, tremor present  Cognition and Speech: Speech clear and coherent  Psychiatric: Appropriate mood and affect    Labs/Imaging:     All labs and test on this note have been reviewed and analyzed and taken into  consideration for taking care of the patient.    Labs:  Recent Labs   Lab 11/27/24  0955 11/26/24  0847 11/25/24  2009 11/25/24  1511 11/25/24  1124   WBC 6.5 8.8  --   --  13.1*   RBC 3.56* 4.49*  --   --  5.02   HGB 9.2* 11.5* 12.2*   < > 13.0   HCT 29.6* 37.6* 39.9   < > 42.1   MCV 83.1 83.7  --   --  83.9    280 230  --  270    < > = values in this interval not displayed.     Recent Labs   Lab 11/27/24  0955 11/26/24  0847 11/25/24  1124   SODIUM 136 140 142   POTASSIUM 3.8 4.0 4.6   CHLORIDE 105 110 107   CO2 27 25 29   BUN 48* 42* 28*   CREATININE 3.00* 3.51* 1.37*   GLUCOSE 157* 193* 182*   CALCIUM 7.8* 8.0* 8.6   MG  --  2.1  --    PHOS  --  6.9*  --        Recent Labs   Lab 11/25/24  0533   AST 16   BILIRUBIN 0.4   TOTPROTEIN 6.0*   ALBUMIN 2.8*       Imaging:  Radiology: XR LUMBAR SPINE 2 OR 3 VIEWS    Result Date: 11/25/2024  EXAM: XR LUMBAR SPINE 2 OR 3 VIEWS CLINICAL INDICATION: Lumbar vertebral compression fractures COMPARISON: Lumbar spine x-rays dated 08/29/2024 and MRI spine survey dated 11/25/2024 TECHNIQUE: 3 views of the lumbar spine were performed. FINDINGS: There are compression deformities of the L1 and L3 vertebral bodies that have progressed compared to prior x-rays. There is greater than 50% loss of vertebral body height at the L3 level and approximately 50% loss of vertebral body height at the L1 level. There is no new vertebral compression deformity. Lumbar alignment is normal.     Progression of L1 and L3 compression deformities compared to lumbar spine x-rays dated 08/29/2024. Electronically Signed by: RAOUL GONSALES M.D. Signed on: 11/25/2024 9:41 PM Workstation ID: [id number]    XR THORACIC SPINE 2 VIEWS    Result Date: 11/25/2024  EXAM: XR THORACIC SPINE 2 VIEWS CLINICAL INDICATION: Thoracic fractures COMPARISON: MRI spine survey dated 11/25/2024 TECHNIQUE: 5 views of the thoracic spine were performed. FINDINGS: Thoracic alignment is normal. There is no significant thoracic  vertebral compression deformity. There is compression deformity of the L1 vertebral body. Vertebral endplates are intact. There are anterior bridging osteophytes at the T8-T9 and T9-T10 levels.     No significant thoracic vertebral body compression deformity. Electronically Signed by: RAOUL GONSALES M.D. Signed on: 11/25/2024 9:40 PM Workstation ID: [id number]    MRI SPINE SURVEY C T L LIMITED WO CONTRAST    Result Date: 11/25/2024  EXAM: MRI SPINE SURVEY C T L LIMITED WO CONTRAST CLINICAL INDICATION: Traumatic injury COMPARISON: CT cervical spine dated 11/25/2024 and lumbar spine x-rays dated 08/29/2024 TECHNIQUE: MRI survey of the total spine was performed using sagittal sequences. FINDINGS: Craniocervical alignment is normal. Cervical alignment is normal. There is degenerative disc disease with discogenic endplate marrow change adjacent the C6-C7 intervertebral disc space. There is no cervical spine fracture. There is no evidence of ligamentous injury. There is no hemorrhage within the cervical spinal canal. There is no intervertebral disc herniation. There is no cervical spinal stenosis or spinal cord compression. There is no signal abnormality involving the cervical spinal cord. Thoracic alignment is normal. There is horizontally oriented marrow signal abnormality with mild deformity of the superior endplates of the T1, T2 and T3 levels. There is no retropulsion of bone into the spinal canal. There is no posterior element involvement. There is no significant loss of intervertebral disc space height. At the T9 level, there is mild chronic superior endplate compression deformity without significant loss of vertebral body height. There is no other thoracic vertebral marrow signal abnormality. There is no thoracic spinal stenosis or spinal cord compression. There is no hemorrhage within the thoracic spinal canal. The thoracic spinal cord is normal in appearance without intramedullary signal abnormality. Lumbar  alignment is normal. There are compression deformities with associated marrow signal abnormality involving the L1 and L3 vertebral bodies. Compression deformities are seen at these levels on x-ray from 08/29/2024. The persistent marrow signal abnormality indicates nonhealing or acute superimposed upon chronic fractures. There is no retropulsion of bone at either level. There is edema within the posterior elements at the L3 level. There is no lumbar intervertebral disc herniation. There is no significant spinal stenosis. There is no hemorrhage within the lumbar spinal canal.     1. Acute microfractures involving the T1, T2 and T3 vertebral bodies without significant loss of vertebral body height. There is no posterior element involvement. 2. Compression deformities at the L1 and L3 levels that were seen on x-rays dated 08/29/2024. Persistent marrow edema indicates incompletely healed fractures or possibly acute upon chronic fractures. 3. No spinal cord injury. No hemorrhage within the spinal canal. Electronically Signed by: RAOUL GONSALES M.D. Signed on: 11/25/2024 8:07 PM Workstation ID: [id number]    XR HUMERUS 2 VIEWS RIGHT    Result Date: 11/25/2024  EXAM: XR HUMERUS 2 VIEWS RIGHT CLINICAL INDICATION: Humerus fracture COMPARISON: None. FINDINGS: There is a comminuted fracture of the head of humerus with separation of fracture fragments. No subluxation is present.     As above Electronically Signed by: RENARD AVILA M.D. Signed on: 11/25/2024 2:58 PM Workstation ID: [id number]    XR ELBOW 2 VIEWS RIGHT    Result Date: 11/25/2024  EXAM: XR ELBOW 2 VIEWS RIGHT CLINICAL INDICATION: Proximal humerus fracture COMPARISON: January 5, 2024 FINDINGS: There are no fractures, subluxations or bone erosions. There is no joint effusion.     Normal right elbow Electronically Signed by: RENARD AVILA M.D. Signed on: 11/25/2024 2:46 PM Workstation ID: [id number]      Assessment and Plan:     #R scapular fracture  #Multiple  age indeterminate spinal compression fractures, likely chronic  #Mechanical fall at home  - pain management and DVT ppx per primary  - PT/OT  - bowel regimen  - neurosurgery on consult -  no acute surgical intervention, CT thoracic and lumbar spine reformatted ordered and MRI thoracic and lumbar spine reviewed  - orthopedic surgery on consult    #acute kidney injury on CKD III   #hyperphosphatemia  Concerned for ATN, currently hemodynamically stable but BP low transiently 11/25  CK level not elevated  Imaging shows no evidence of obstruction.   Nephro consult, cont IVF   hold cymbalta due to CrCl, no nephrotoxic agents     #acute hypoxic respiratory failure   Cont supplemental oxygen, wean off as able      #Acute blood loss anemia 2/2 R gluteal hematoma  - monitor Hb closely   - check H&H later   - hold chemical DVT ppx, especially with Hb drop   - transfuse for Hgb goal 7      #Leukocytosis, suspect reactive due to recent trauma  #Hx pan susceptible E coli UTI in Oct 2021  - UA not consistent with UTI  - patient is afebrile with no other signs of acute infection  - monitor off antibiotics, trend fever curve and check CBC daily     #Hx HTN  # A fib (not on AC)  - resume home amlodipine      #T2DM  -A1c 6.3   - hold farxiga   - low dose ISS and nightly correction     #Hx Parkinson's disease  #Hx Depression  - continue home duloxetine, bupropion, and valbenzine     #Hx HARSHA (on CPAP)  - nightly CPAP ordered           DVT prophylaxis:   On hold as above, due to hematoma/Hb drop         Code Status Information       Code Status    Full Resuscitation             Primary Care Provider: Shahriar Lund MD    MORE than 50 MINS WERE SPENT ON THIS PATIENT'S CARE TODAY. This includes the following: Reviewed all vitals, medications, new orders, Is/Os, labs, micro, radiology, nurses notes, pertinent consultant notes which are reflected in assessment and plan.    D/w RN and trauma APN    Anita Nevarez MD  [facility]  Hospitalist  11/27/2024

## 2025-03-07 ENCOUNTER — HOSPITAL ENCOUNTER (OUTPATIENT)
Dept: GENERAL RADIOLOGY | Facility: OTHER | Age: 50
Discharge: HOME | End: 2025-03-07
Payer: COMMERCIAL

## 2025-03-07 DIAGNOSIS — G89.29 CHRONIC RIGHT SHOULDER PAIN: ICD-10-CM

## 2025-03-07 DIAGNOSIS — M25.511 CHRONIC RIGHT SHOULDER PAIN: ICD-10-CM

## 2025-03-07 PROCEDURE — 73030 X-RAY EXAM OF SHOULDER: CPT | Mod: RT

## 2025-03-20 ENCOUNTER — HOSPITAL ENCOUNTER (OUTPATIENT)
Dept: MAMMOGRAPHY | Facility: OTHER | Age: 50
Discharge: HOME | End: 2025-03-20
Payer: COMMERCIAL

## 2025-03-20 DIAGNOSIS — Z12.31 ENCOUNTER FOR SCREENING MAMMOGRAM FOR BREAST CANCER: ICD-10-CM

## 2025-03-20 PROCEDURE — 77063 BREAST TOMOSYNTHESIS BI: CPT

## 2025-04-09 ENCOUNTER — TELEPHONE (OUTPATIENT)
Dept: FAMILY MEDICINE | Facility: OTHER | Age: 50
End: 2025-04-09
Payer: COMMERCIAL

## 2025-04-09 DIAGNOSIS — F40.240 CLAUSTROPHOBIA: Primary | ICD-10-CM

## 2025-04-09 NOTE — TELEPHONE ENCOUNTER
Patient was called to schedule MR Shoulder, indicated she was claustrophobic during screening questions. Please place prescription for anti-anxiety medication per MR protocol.    Rebecca Lazar on 4/9/2025 at 1:30 PM

## 2025-04-10 RX ORDER — LORAZEPAM 1 MG/1
1 TABLET ORAL ONCE
Qty: 1 TABLET | Refills: 0 | Status: SHIPPED | OUTPATIENT
Start: 2025-04-10 | End: 2025-04-10

## 2025-04-17 ENCOUNTER — HOSPITAL ENCOUNTER (OUTPATIENT)
Dept: MRI IMAGING | Facility: OTHER | Age: 50
Discharge: HOME | End: 2025-04-17
Attending: NURSE PRACTITIONER
Payer: COMMERCIAL

## 2025-04-17 DIAGNOSIS — G89.29 CHRONIC RIGHT SHOULDER PAIN: ICD-10-CM

## 2025-04-17 DIAGNOSIS — M25.511 CHRONIC RIGHT SHOULDER PAIN: ICD-10-CM

## 2025-04-17 PROCEDURE — 73221 MRI JOINT UPR EXTREM W/O DYE: CPT | Mod: RT

## 2025-05-13 ENCOUNTER — OFFICE VISIT (OUTPATIENT)
Dept: FAMILY MEDICINE | Facility: OTHER | Age: 50
End: 2025-05-13
Attending: NURSE PRACTITIONER
Payer: COMMERCIAL

## 2025-05-13 VITALS
HEART RATE: 72 BPM | BODY MASS INDEX: 29.86 KG/M2 | RESPIRATION RATE: 16 BRPM | DIASTOLIC BLOOD PRESSURE: 80 MMHG | OXYGEN SATURATION: 96 % | WEIGHT: 197 LBS | SYSTOLIC BLOOD PRESSURE: 120 MMHG | HEIGHT: 68 IN

## 2025-05-13 DIAGNOSIS — F41.9 ANXIETY: ICD-10-CM

## 2025-05-13 DIAGNOSIS — R45.86 MOOD SWINGS: ICD-10-CM

## 2025-05-13 DIAGNOSIS — N95.1 VAGINAL DRYNESS, MENOPAUSAL: Primary | ICD-10-CM

## 2025-05-13 DIAGNOSIS — Z87.891 PERSONAL HISTORY OF TOBACCO USE, PRESENTING HAZARDS TO HEALTH: ICD-10-CM

## 2025-05-13 PROCEDURE — G0463 HOSPITAL OUTPT CLINIC VISIT: HCPCS

## 2025-05-13 RX ORDER — BUPROPION HYDROCHLORIDE 150 MG/1
150 TABLET ORAL EVERY MORNING
Qty: 90 TABLET | Refills: 0 | Status: SHIPPED | OUTPATIENT
Start: 2025-05-13

## 2025-05-13 ASSESSMENT — ANXIETY QUESTIONNAIRES
3. WORRYING TOO MUCH ABOUT DIFFERENT THINGS: SEVERAL DAYS
7. FEELING AFRAID AS IF SOMETHING AWFUL MIGHT HAPPEN: NOT AT ALL
5. BEING SO RESTLESS THAT IT IS HARD TO SIT STILL: NOT AT ALL
GAD7 TOTAL SCORE: 4
8. IF YOU CHECKED OFF ANY PROBLEMS, HOW DIFFICULT HAVE THESE MADE IT FOR YOU TO DO YOUR WORK, TAKE CARE OF THINGS AT HOME, OR GET ALONG WITH OTHER PEOPLE?: SOMEWHAT DIFFICULT
IF YOU CHECKED OFF ANY PROBLEMS ON THIS QUESTIONNAIRE, HOW DIFFICULT HAVE THESE PROBLEMS MADE IT FOR YOU TO DO YOUR WORK, TAKE CARE OF THINGS AT HOME, OR GET ALONG WITH OTHER PEOPLE: SOMEWHAT DIFFICULT
GAD7 TOTAL SCORE: 4
6. BECOMING EASILY ANNOYED OR IRRITABLE: SEVERAL DAYS
2. NOT BEING ABLE TO STOP OR CONTROL WORRYING: NOT AT ALL
GAD7 TOTAL SCORE: 4
7. FEELING AFRAID AS IF SOMETHING AWFUL MIGHT HAPPEN: NOT AT ALL
1. FEELING NERVOUS, ANXIOUS, OR ON EDGE: SEVERAL DAYS
4. TROUBLE RELAXING: SEVERAL DAYS

## 2025-05-13 ASSESSMENT — PAIN SCALES - GENERAL: PAINLEVEL_OUTOF10: MODERATE PAIN (4)

## 2025-05-13 NOTE — PROGRESS NOTES
Assessment & Plan   Problem List Items Addressed This Visit          Urinary    Vaginal dryness, menopausal - Primary    Relevant Medications    conjugated estrogens (PREMARIN) 0.625 MG/GM vaginal cream (Start on 5/15/2025)       Behavioral    Personal history of tobacco use, presenting hazards to health       Other    Mood swings    Relevant Medications    buPROPion (WELLBUTRIN XL) 150 MG 24 hr tablet    Anxiety    Relevant Medications    buPROPion (WELLBUTRIN XL) 150 MG 24 hr tablet      Discussed that the likelihood of fluoxetine causing her vaginal dryness with minimal.  In talking further with her regarding her symptoms and other risk factors, discussed using Wellbutrin XL.  She does smoke which this may help with that, help with some of her depression and anxiety type symptoms/mood swings as well as motivation.  She is open to trying this.  150 mg daily sent to pharmacy.  This is also weight neutral, she is worried about her weight.  Order for Premarin cream to be used twice weekly due to vaginal dryness.  Follow-up in 1 month if she is having concerns, sooner with any other issues.    The longitudinal plan of care for the diagnosis(es)/condition(s) as documented were addressed during this visit. Due to the added complexity in care, I will continue to support Tere in the subsequent management and with ongoing continuity of care.      Subjective   Tere is a 50 year old, presenting for the following health issues:  RECHECK    History of Present Illness       Mental Health Follow-up:  Patient presents to follow-up on Depression & Anxiety.Patient's depression since last visit has been:  Better  The patient is not having other symptoms associated with depression.  Patient's anxiety since last visit has been:  Medium  The patient is not having other symptoms associated with anxiety.  Any significant life events: No  Patient is not feeling anxious or having panic attacks.  Patient has no concerns about alcohol or  "drug use.    She eats 2-3 servings of fruits and vegetables daily.She consumes 4 sweetened beverage(s) daily.She exercises with enough effort to increase her heart rate 10 to 19 minutes per day.  She exercises with enough effort to increase her heart rate 5 days per week.   She is taking medications regularly.        She presents clinic today for follow-up.  She was started on fluoxetine about a month ago.  Reports she stopped this as she did not feel that this was helping her symptoms.  She also started having significant vaginal  dryness and is worried the medication was causing this.  She continues have depression and anxiety symptoms, lack of motivation.  She has not had a period for the past 16 months.      Objective    /80   Pulse 72   Resp 16   Ht 1.727 m (5' 8\")   Wt 89.4 kg (197 lb)   SpO2 96%   BMI 29.95 kg/m    Body mass index is 29.95 kg/m .  Physical Exam   GENERAL: alert and no distress  EYES: Eyes grossly normal to inspection  NEURO: Normal strength and tone, mentation intact and speech normal  PSYCH: mentation appears normal, affect normal/bright            Signed Electronically by: WANDA Galaviz CNP    "

## 2025-05-13 NOTE — NURSING NOTE
Patient presents today for shoulder pain and depression.    Medication Reconciliation Complete    Hayley Riggs LPN  5/13/2025 2:23 PM

## 2025-05-19 ENCOUNTER — MYC MEDICAL ADVICE (OUTPATIENT)
Dept: FAMILY MEDICINE | Facility: OTHER | Age: 50
End: 2025-05-19
Payer: COMMERCIAL

## 2025-05-19 DIAGNOSIS — N95.1 VAGINAL DRYNESS, MENOPAUSAL: ICD-10-CM

## 2025-05-19 NOTE — TELEPHONE ENCOUNTER
Resent Premarin Vaginal cream to [facility].     Patient update on MyChart.    Rosalva Jenkins RN on 5/19/2025 at 12:13 PM

## 2025-06-02 ENCOUNTER — OFFICE VISIT (OUTPATIENT)
Dept: ORTHOPEDICS | Facility: OTHER | Age: 50
End: 2025-06-02
Attending: NURSE PRACTITIONER
Payer: COMMERCIAL

## 2025-06-02 VITALS — HEART RATE: 71 BPM | BODY MASS INDEX: 29.95 KG/M2 | WEIGHT: 197 LBS | OXYGEN SATURATION: 97 %

## 2025-06-02 DIAGNOSIS — M25.511 CHRONIC RIGHT SHOULDER PAIN: ICD-10-CM

## 2025-06-02 DIAGNOSIS — G89.29 CHRONIC RIGHT SHOULDER PAIN: ICD-10-CM

## 2025-06-02 PROCEDURE — 250N000011 HC RX IP 250 OP 636: Mod: JZ | Performed by: ORTHOPAEDIC SURGERY

## 2025-06-02 PROCEDURE — 20610 DRAIN/INJ JOINT/BURSA W/O US: CPT | Mod: RT | Performed by: ORTHOPAEDIC SURGERY

## 2025-06-02 PROCEDURE — 250N000009 HC RX 250: Performed by: ORTHOPAEDIC SURGERY

## 2025-06-02 PROCEDURE — G0463 HOSPITAL OUTPT CLINIC VISIT: HCPCS

## 2025-06-02 PROCEDURE — G0463 HOSPITAL OUTPT CLINIC VISIT: HCPCS | Mod: 25

## 2025-06-02 PROCEDURE — 99203 OFFICE O/P NEW LOW 30 MIN: CPT | Mod: 25 | Performed by: ORTHOPAEDIC SURGERY

## 2025-06-02 RX ORDER — TRIAMCINOLONE ACETONIDE 40 MG/ML
40 INJECTION, SUSPENSION INTRA-ARTICULAR; INTRAMUSCULAR ONCE
Status: COMPLETED | OUTPATIENT
Start: 2025-06-02 | End: 2025-06-02

## 2025-06-02 RX ORDER — LIDOCAINE HYDROCHLORIDE 10 MG/ML
4 INJECTION, SOLUTION INFILTRATION; PERINEURAL ONCE
Status: COMPLETED | OUTPATIENT
Start: 2025-06-02 | End: 2025-06-02

## 2025-06-02 RX ADMIN — TRIAMCINOLONE ACETONIDE 40 MG: 40 INJECTION, SUSPENSION INTRA-ARTICULAR; INTRAMUSCULAR at 13:51

## 2025-06-02 RX ADMIN — LIDOCAINE HYDROCHLORIDE 4 ML: 10 INJECTION, SOLUTION INFILTRATION; PERINEURAL at 13:51

## 2025-06-02 ASSESSMENT — PAIN SCALES - GENERAL: PAINLEVEL_OUTOF10: MILD PAIN (3)

## 2025-06-02 NOTE — PROGRESS NOTES
Subjective:    50-year-old female with chronic right shoulder pain.  She states that she has had right shoulder pain for about 10 years.  She notes no injury to the right shoulder in the past.  Her shoulder just seems to have gotten quite a bit worse recently.  She has had an x-ray done and an MRI done.  She states that she is also quitting smoking.    Objective:    On examination today she has full range of motion of her shoulder with good strength in the rotator cuff in all planes.  She does have significant pain with supraspinatus and infraspinatus testing and some mild giveaway weakness.  She is tender to palpation at Codman's point nontender palpation the AC joint and nontender to palpation of the bicipital groove.  She is neurovascularly intact.    Assessment:    50-year-old female with a small interstitial rotator cuff tear    Plan:    We have given her an injection into the right shoulder today into the subacromial space.  She tolerated well and had good relief of her pain.  Will also get her into physical therapy to strengthen the rotator cuff.  Quitting smoking will be key to healing this interstitial rotator cuff tear which has a good chance to heal on its own.  If she is recalcitrant to this treatment we will consider an arthroscopy with subacromial decompression, distal clavicle excision and possible rotator cuff repair.  All of this was expressed to her and she understands.    A steroid injection was performed at right subacromial space using 1% plain Lidocaine and 40 mg of Kenalog. This was well tolerated.

## 2025-06-12 ENCOUNTER — MYC MEDICAL ADVICE (OUTPATIENT)
Dept: FAMILY MEDICINE | Facility: OTHER | Age: 50
End: 2025-06-12
Payer: COMMERCIAL

## 2025-06-12 DIAGNOSIS — N95.1 VAGINAL DRYNESS, MENOPAUSAL: Primary | ICD-10-CM

## 2025-06-12 RX ORDER — ESTRADIOL 0.1 MG/G
2 CREAM VAGINAL
Qty: 42.5 G | Refills: 4 | Status: SHIPPED | OUTPATIENT
Start: 2025-06-12

## 2025-06-12 NOTE — TELEPHONE ENCOUNTER
Wondering what's going on with the vaginal cream.     Per phone message from 6/4:      From [employer] Message from 6/4      Unsure which med you prefer to order.     Routing to provider to review and respond.  Franny Ansari RN on 6/12/2025 at 2:20 PM

## 2025-06-28 ENCOUNTER — MYC REFILL (OUTPATIENT)
Dept: INTERNAL MEDICINE | Facility: OTHER | Age: 50
End: 2025-06-28
Payer: COMMERCIAL

## 2025-06-28 DIAGNOSIS — G43.E11 INTRACTABLE CHRONIC MIGRAINE WITH AURA WITH STATUS MIGRAINOSUS: ICD-10-CM

## 2025-07-01 RX ORDER — RIZATRIPTAN BENZOATE 10 MG/1
10 TABLET ORAL
Qty: 120 TABLET | Refills: 1 | Status: SHIPPED | OUTPATIENT
Start: 2025-07-01

## 2025-07-01 NOTE — TELEPHONE ENCOUNTER
Requested Prescriptions   Pending Prescriptions Disp Refills    rizatriptan (MAXALT) 10 MG tablet 120 tablet 1     Sig: Take 1 tablet (10 mg) by mouth at onset of headache for migraine. May repeat in 2 hours. Max 3 tablets/24 hours.       Serotonin Agonists Failed - 7/1/2025  3:56 PM        Failed - Review patient's medical record. If the patient has used less than or equal to nine (9) tablets or injections for migraine a month the RN may authorize the refill request.     Last Prescription Date:   8/27/24  Last Fill Qty/Refills:         120, R-1    Last Office Visit:                5/13/25   3/7/25 (Physical)    Future Office visit:            None    Per LOV note:  Return in about 53 weeks (around 3/13/2026) for Annual Wellness Visit.     Unable to complete prescription refill per RN Medication Refill Policy. Karina Barajas, Refill RN .............. 7/1/2025  3:58 PM

## (undated) DEVICE — SUTURE-VICRYL 3-0 PS-1 J936H

## (undated) DEVICE — LIGHT HANDLE COVER

## (undated) DEVICE — ENDO BRUSH CHANNEL MASTER CLEANING 2-4.2MM BW-412T

## (undated) DEVICE — GEL ULTRASOUND AQUASONIC 20GM 01-01

## (undated) DEVICE — SYR BULB IRRIG 50ML LATEX FREE 0035280

## (undated) DEVICE — GOWN-SURG XL LVL 3 REINFORCED

## (undated) DEVICE — ABLATOR ARTHREX APOLLO RF ASPIRATING 50DEG AR-9815

## (undated) DEVICE — PACK-LAPAROTOMY-CUSTOM

## (undated) DEVICE — DRSG-ISLAND 4IN X 5IN

## (undated) DEVICE — SU PROLENE 3-0 FS-1 18" 8684G

## (undated) DEVICE — SYRINGE-ASEPTO IRRIGATION

## (undated) DEVICE — SU VICRYL 2-0 SH 27" UND J417H

## (undated) DEVICE — NDL-21G 1-1/2" NON-SAFETY

## (undated) DEVICE — PREP CHLORAPREP 26ML TINTED ORANGE  260815

## (undated) DEVICE — SCD SLEEVE-KNEE REG.

## (undated) DEVICE — ENDO TRAP POLYP E-TRAP 00711099

## (undated) DEVICE — BLADE KNIFE SURG 15 371115

## (undated) DEVICE — TUBING SUCTION 10'X3/16" N510

## (undated) DEVICE — ANOSCOPE EXAMINATION DISP W/LIGHT 18X90MM C060100

## (undated) DEVICE — SUCTION MANIFOLD NEPTUNE 2 SYS 4 PORT 0702-020-000

## (undated) DEVICE — DRAPE STERI U 1015

## (undated) DEVICE — DRAPE SHEET REV FOLD 3/4 9349

## (undated) DEVICE — BLADE-SCALPEL #15

## (undated) DEVICE — PACK KNEE ARTHROSCOPY CUSTOM SOP15KAFCA

## (undated) DEVICE — APPLICATOR-CHLORAPREP 26ML TINTED CHG 2%+ 70% IPA-SURGICAL

## (undated) DEVICE — GLOVE PROTEXIS PI ORTHO PF 8.5 2D73HT76

## (undated) DEVICE — GLOVE BIOGEL PI INDICATOR 8.0 LF 41680

## (undated) DEVICE — DRSG GAUZE 4X4" TRAY 6939

## (undated) DEVICE — CAST PLASTER SPLINT 5X30" 7395

## (undated) DEVICE — ESU PENCIL SMOKE EVAC W/ROCKER SWITCH 0703-047-000

## (undated) DEVICE — GLV-7.0 PROTEXIS PI CLASSIC LF/PF

## (undated) DEVICE — SYRINGE-20CC LUER LOCK

## (undated) DEVICE — ENDO SNARE EXACTO COLD 9MM LOOP 2.4MMX230CM 00711115

## (undated) DEVICE — ENDO KIT COMPLIANCE DYKENDOCMPLY

## (undated) DEVICE — DRSG-KERLIX 6 X 6 3/4 FLUFF

## (undated) DEVICE — LABEL-STERILE PREPRINTED FOR OR

## (undated) DEVICE — CAUTERY PAD-POLYHESIVE II ADULT

## (undated) DEVICE — TUBING ARTHROSCOPY PUMP ARTHREX AR-6410

## (undated) DEVICE — CAST PADDING 4" COTTON WEBRIL UNSTERILE 9084

## (undated) DEVICE — IRRIGATION-H2O 1000ML

## (undated) DEVICE — DRSG-SPONGE X-RAY 4 X 4

## (undated) DEVICE — SWABSTICK-BENZOIN

## (undated) DEVICE — SLEEVE COMPRESSION SCD KNEE MED 74022

## (undated) DEVICE — BNDG ELASTIC 4" DBL LENGTH UNSTERILE 6611-14

## (undated) DEVICE — GLOVE BIOGEL INDICATOR 7.5 LF 41675

## (undated) DEVICE — GLOVE PROTEXIS POWDER FREE SMT 7.5  2D72PT75X

## (undated) DEVICE — SOL WATER 1500ML

## (undated) DEVICE — TUBING-SUCTION 20FT

## (undated) DEVICE — STERI-STRIP-1" X 5" IODOFORM

## (undated) DEVICE — DRSG JUMPSTART ANTIMICROBIAL 1.5X8" ABS-4005

## (undated) DEVICE — Device

## (undated) DEVICE — COVER LIGHT HANDLE LT-F02

## (undated) DEVICE — TOPICAL SKIN ADHESIVE EXOFIN

## (undated) DEVICE — SUTURE-VICRYL 3-0 SH J416H

## (undated) DEVICE — NDL-18G 1 1/2" NON-SAFETY

## (undated) DEVICE — KIT ARTHROSCOPIC SURGICAL PROCEDURE DISP AR-8990DS

## (undated) DEVICE — ANTIFOG SOLUTION W/FOAM PAD CF-1001

## (undated) RX ORDER — FENTANYL CITRATE 50 UG/ML
INJECTION, SOLUTION INTRAMUSCULAR; INTRAVENOUS
Status: DISPENSED
Start: 2017-01-30

## (undated) RX ORDER — TRIAMCINOLONE ACETONIDE 40 MG/ML
INJECTION, SUSPENSION INTRA-ARTICULAR; INTRAMUSCULAR
Status: DISPENSED
Start: 2023-03-23

## (undated) RX ORDER — KETOROLAC TROMETHAMINE 30 MG/ML
INJECTION, SOLUTION INTRAMUSCULAR; INTRAVENOUS
Status: DISPENSED
Start: 2023-08-07

## (undated) RX ORDER — EPINEPHRINE 1 MG/ML
INJECTION INTRAMUSCULAR; INTRAVENOUS; SUBCUTANEOUS
Status: DISPENSED
Start: 2023-02-16

## (undated) RX ORDER — DEXAMETHASONE SODIUM PHOSPHATE 10 MG/ML
INJECTION, SOLUTION INTRAMUSCULAR; INTRAVENOUS
Status: DISPENSED
Start: 2024-07-18

## (undated) RX ORDER — PROPOFOL 10 MG/ML
INJECTION, EMULSION INTRAVENOUS
Status: DISPENSED
Start: 2023-02-16

## (undated) RX ORDER — KETOROLAC TROMETHAMINE 30 MG/ML
INJECTION, SOLUTION INTRAMUSCULAR; INTRAVENOUS
Status: DISPENSED
Start: 2023-11-09

## (undated) RX ORDER — DIPHENHYDRAMINE HCL 25 MG
CAPSULE ORAL
Status: DISPENSED
Start: 2024-07-18

## (undated) RX ORDER — LIDOCAINE HYDROCHLORIDE 10 MG/ML
INJECTION, SOLUTION EPIDURAL; INFILTRATION; INTRACAUDAL; PERINEURAL
Status: DISPENSED
Start: 2023-03-23

## (undated) RX ORDER — DIPHENHYDRAMINE HCL 25 MG
CAPSULE ORAL
Status: DISPENSED
Start: 2023-11-09

## (undated) RX ORDER — DEXAMETHASONE SODIUM PHOSPHATE 10 MG/ML
INJECTION, SOLUTION INTRAMUSCULAR; INTRAVENOUS
Status: DISPENSED
Start: 2017-01-30

## (undated) RX ORDER — KETOROLAC TROMETHAMINE 30 MG/ML
INJECTION, SOLUTION INTRAMUSCULAR; INTRAVENOUS
Status: DISPENSED
Start: 2022-06-27

## (undated) RX ORDER — DEXAMETHASONE SODIUM PHOSPHATE 4 MG/ML
INJECTION, SOLUTION INTRA-ARTICULAR; INTRALESIONAL; INTRAMUSCULAR; INTRAVENOUS; SOFT TISSUE
Status: DISPENSED
Start: 2024-07-18

## (undated) RX ORDER — BUPIVACAINE HYDROCHLORIDE 5 MG/ML
INJECTION, SOLUTION EPIDURAL; INTRACAUDAL
Status: DISPENSED
Start: 2023-02-16

## (undated) RX ORDER — ONDANSETRON 2 MG/ML
INJECTION INTRAMUSCULAR; INTRAVENOUS
Status: DISPENSED
Start: 2023-02-16

## (undated) RX ORDER — KETOROLAC TROMETHAMINE 30 MG/ML
INJECTION, SOLUTION INTRAMUSCULAR; INTRAVENOUS
Status: DISPENSED
Start: 2022-08-18

## (undated) RX ORDER — ONDANSETRON 2 MG/ML
INJECTION INTRAMUSCULAR; INTRAVENOUS
Status: DISPENSED
Start: 2017-01-30

## (undated) RX ORDER — DEXAMETHASONE SODIUM PHOSPHATE 4 MG/ML
INJECTION, SOLUTION INTRA-ARTICULAR; INTRALESIONAL; INTRAMUSCULAR; INTRAVENOUS; SOFT TISSUE
Status: DISPENSED
Start: 2022-09-14

## (undated) RX ORDER — KETOROLAC TROMETHAMINE 30 MG/ML
INJECTION, SOLUTION INTRAMUSCULAR; INTRAVENOUS
Status: DISPENSED
Start: 2024-07-18

## (undated) RX ORDER — TRIAMCINOLONE ACETONIDE 40 MG/ML
INJECTION, SUSPENSION INTRA-ARTICULAR; INTRAMUSCULAR
Status: DISPENSED
Start: 2025-06-02

## (undated) RX ORDER — KETOROLAC TROMETHAMINE 30 MG/ML
INJECTION, SOLUTION INTRAMUSCULAR; INTRAVENOUS
Status: DISPENSED
Start: 2022-09-14

## (undated) RX ORDER — DEXAMETHASONE SODIUM PHOSPHATE 10 MG/ML
INJECTION, SOLUTION INTRAMUSCULAR; INTRAVENOUS
Status: DISPENSED
Start: 2023-02-16

## (undated) RX ORDER — LIDOCAINE HYDROCHLORIDE 10 MG/ML
INJECTION, SOLUTION INFILTRATION; PERINEURAL
Status: DISPENSED
Start: 2025-06-02

## (undated) RX ORDER — ACYCLOVIR 200 MG/1
CAPSULE ORAL
Status: DISPENSED
Start: 2023-02-16

## (undated) RX ORDER — LIDOCAINE HYDROCHLORIDE 20 MG/ML
INJECTION, SOLUTION EPIDURAL; INFILTRATION; INTRACAUDAL; PERINEURAL
Status: DISPENSED
Start: 2017-01-30

## (undated) RX ORDER — CEFAZOLIN SODIUM/WATER 2 G/20 ML
SYRINGE (ML) INTRAVENOUS
Status: DISPENSED
Start: 2023-02-16

## (undated) RX ORDER — PROPOFOL 10 MG/ML
INJECTION, EMULSION INTRAVENOUS
Status: DISPENSED
Start: 2017-01-30

## (undated) RX ORDER — DEXAMETHASONE SODIUM PHOSPHATE 4 MG/ML
INJECTION, SOLUTION INTRA-ARTICULAR; INTRALESIONAL; INTRAMUSCULAR; INTRAVENOUS; SOFT TISSUE
Status: DISPENSED
Start: 2023-11-09